# Patient Record
Sex: FEMALE | Race: WHITE | Employment: OTHER | URBAN - METROPOLITAN AREA
[De-identification: names, ages, dates, MRNs, and addresses within clinical notes are randomized per-mention and may not be internally consistent; named-entity substitution may affect disease eponyms.]

---

## 2018-12-06 ENCOUNTER — TELEPHONE (OUTPATIENT)
Dept: ONCOLOGY | Age: 63
End: 2018-12-06

## 2018-12-17 ENCOUNTER — HOSPITAL ENCOUNTER (OUTPATIENT)
Dept: INFUSION THERAPY | Age: 63
Discharge: HOME | End: 2018-12-17
Payer: COMMERCIAL

## 2018-12-17 ENCOUNTER — TELEPHONE (OUTPATIENT)
Dept: INFUSION THERAPY | Age: 63
End: 2018-12-17

## 2018-12-17 ENCOUNTER — OFFICE VISIT (OUTPATIENT)
Dept: ONCOLOGY | Age: 63
End: 2018-12-17
Payer: COMMERCIAL

## 2018-12-17 VITALS
HEIGHT: 72 IN | SYSTOLIC BLOOD PRESSURE: 117 MMHG | RESPIRATION RATE: 20 BRPM | HEART RATE: 84 BPM | TEMPERATURE: 97.9 F | DIASTOLIC BLOOD PRESSURE: 56 MMHG

## 2018-12-17 DIAGNOSIS — C95.00 ACUTE LEUKEMIA NOT HAVING ACHIEVED REMISSION (HCC): Primary | ICD-10-CM

## 2018-12-17 DIAGNOSIS — C95.00 ACUTE LEUKEMIA NOT HAVING ACHIEVED REMISSION (HCC): ICD-10-CM

## 2018-12-17 LAB
ABO/RH: NORMAL
ALBUMIN SERPL-MCNC: 3 G/DL (ref 3.5–5.2)
ALP BLD-CCNC: 85 U/L (ref 35–104)
ALT SERPL-CCNC: 21 U/L (ref 0–32)
ANION GAP SERPL CALCULATED.3IONS-SCNC: 12 MMOL/L (ref 7–16)
ANTIBODY SCREEN: NORMAL
AST SERPL-CCNC: 14 U/L (ref 0–31)
BASOPHILS ABSOLUTE: 0 E9/L (ref 0–0.2)
BASOPHILS RELATIVE PERCENT: 0 % (ref 0–2)
BILIRUB SERPL-MCNC: 0.3 MG/DL (ref 0–1.2)
BUN BLDV-MCNC: 24 MG/DL (ref 8–23)
CALCIUM SERPL-MCNC: 8.9 MG/DL (ref 8.6–10.2)
CHLORIDE BLD-SCNC: 98 MMOL/L (ref 98–107)
CO2: 24 MMOL/L (ref 22–29)
CREAT SERPL-MCNC: 1.1 MG/DL (ref 0.5–1)
EOSINOPHILS ABSOLUTE: 0 E9/L (ref 0.05–0.5)
EOSINOPHILS RELATIVE PERCENT: 1.9 % (ref 0–6)
GFR AFRICAN AMERICAN: >60
GFR NON-AFRICAN AMERICAN: 50 ML/MIN/1.73
GLUCOSE BLD-MCNC: 253 MG/DL (ref 74–99)
HCT VFR BLD CALC: 21 % (ref 34–48)
HEMOGLOBIN: 7 G/DL (ref 11.5–15.5)
LYMPHOCYTES ABSOLUTE: 0.96 E9/L (ref 1.5–4)
LYMPHOCYTES RELATIVE PERCENT: 87.3 % (ref 20–42)
MAGNESIUM: 1.4 MG/DL (ref 1.6–2.6)
MCH RBC QN AUTO: 29.8 PG (ref 26–35)
MCHC RBC AUTO-ENTMCNC: 33.3 % (ref 32–34.5)
MCV RBC AUTO: 89.4 FL (ref 80–99.9)
METAMYELOCYTES RELATIVE PERCENT: 2.8 % (ref 0–1)
MONOCYTES ABSOLUTE: 0.1 E9/L (ref 0.1–0.95)
MONOCYTES RELATIVE PERCENT: 8.5 % (ref 2–12)
NEUTROPHILS ABSOLUTE: 0.03 E9/L (ref 1.8–7.3)
NEUTROPHILS RELATIVE PERCENT: 3.7 % (ref 43–80)
PDW BLD-RTO: 13.7 FL (ref 11.5–15)
PLATELET # BLD: 13 E9/L (ref 130–450)
PLATELET CONFIRMATION: NORMAL
PMV BLD AUTO: 12.8 FL (ref 7–12)
POTASSIUM SERPL-SCNC: 4 MMOL/L (ref 3.5–5)
PROMYELOCYTES PERCENT: 1.4 % (ref 0–0)
RBC # BLD: 2.35 E12/L (ref 3.5–5.5)
SODIUM BLD-SCNC: 134 MMOL/L (ref 132–146)
TOTAL PROTEIN: 6.7 G/DL (ref 6.4–8.3)
URIC ACID, SERUM: 6.1 MG/DL (ref 2.4–5.7)
WBC # BLD: 1.1 E9/L (ref 4.5–11.5)

## 2018-12-17 PROCEDURE — 1036F TOBACCO NON-USER: CPT | Performed by: INTERNAL MEDICINE

## 2018-12-17 PROCEDURE — 86850 RBC ANTIBODY SCREEN: CPT

## 2018-12-17 PROCEDURE — 80053 COMPREHEN METABOLIC PANEL: CPT

## 2018-12-17 PROCEDURE — G8427 DOCREV CUR MEDS BY ELIG CLIN: HCPCS | Performed by: INTERNAL MEDICINE

## 2018-12-17 PROCEDURE — 85025 COMPLETE CBC W/AUTO DIFF WBC: CPT

## 2018-12-17 PROCEDURE — 83735 ASSAY OF MAGNESIUM: CPT

## 2018-12-17 PROCEDURE — 84550 ASSAY OF BLOOD/URIC ACID: CPT

## 2018-12-17 PROCEDURE — 3017F COLORECTAL CA SCREEN DOC REV: CPT | Performed by: INTERNAL MEDICINE

## 2018-12-17 PROCEDURE — 86901 BLOOD TYPING SEROLOGIC RH(D): CPT

## 2018-12-17 PROCEDURE — 36415 COLL VENOUS BLD VENIPUNCTURE: CPT

## 2018-12-17 PROCEDURE — 99214 OFFICE O/P EST MOD 30 MIN: CPT | Performed by: INTERNAL MEDICINE

## 2018-12-17 PROCEDURE — G8421 BMI NOT CALCULATED: HCPCS | Performed by: INTERNAL MEDICINE

## 2018-12-17 PROCEDURE — 99205 OFFICE O/P NEW HI 60 MIN: CPT | Performed by: INTERNAL MEDICINE

## 2018-12-17 PROCEDURE — 86900 BLOOD TYPING SEROLOGIC ABO: CPT

## 2018-12-17 PROCEDURE — G8484 FLU IMMUNIZE NO ADMIN: HCPCS | Performed by: INTERNAL MEDICINE

## 2018-12-17 RX ORDER — ONDANSETRON HYDROCHLORIDE 8 MG/1
8 TABLET, FILM COATED ORAL EVERY 8 HOURS PRN
COMMUNITY
End: 2019-12-06

## 2018-12-17 RX ORDER — LOSARTAN POTASSIUM 100 MG/1
100 TABLET ORAL DAILY
COMMUNITY

## 2018-12-17 RX ORDER — CIPROFLOXACIN 500 MG/1
500 TABLET, FILM COATED ORAL 2 TIMES DAILY
COMMUNITY
End: 2019-01-07 | Stop reason: SDUPTHER

## 2018-12-17 RX ORDER — ACYCLOVIR 400 MG/1
400 TABLET ORAL 2 TIMES DAILY
COMMUNITY
End: 2020-11-17 | Stop reason: ALTCHOICE

## 2018-12-17 RX ORDER — ISOSORBIDE MONONITRATE 30 MG/1
30 TABLET, EXTENDED RELEASE ORAL DAILY
COMMUNITY

## 2018-12-17 RX ORDER — ATORVASTATIN CALCIUM 80 MG/1
80 TABLET, FILM COATED ORAL DAILY
COMMUNITY
End: 2019-10-22

## 2018-12-17 RX ORDER — FLUCONAZOLE 200 MG/1
400 TABLET ORAL DAILY
COMMUNITY
End: 2019-06-21 | Stop reason: SDUPTHER

## 2018-12-17 RX ORDER — METOPROLOL TARTRATE 100 MG/1
100 TABLET ORAL 2 TIMES DAILY
COMMUNITY

## 2018-12-17 RX ORDER — PANTOPRAZOLE SODIUM 40 MG/1
40 TABLET, DELAYED RELEASE ORAL DAILY
COMMUNITY

## 2018-12-17 NOTE — PROGRESS NOTES
[address] [facility] [address] [address]  Dept: [phone]  Loc: [phone]  Attending Consult Note      Reason for Visit:   AML. Referring Physician: Dr. Alondra Nayak (C/Wali Anaya). PCP:  No primary care provider on file. History of Present Illness: The patient is a pleasant 58-year-old lady with a past medical history significant for hypertension, type II diabetes mellitus, hyperlipidemia, coronary artery disease, status post stents placement, morbid obesity, who had presented to [facility] for his near syncopal episode, weakness and profound fatigue, she was found to have a white count of 31.3, hemoglobin was 5.8, platelet count 13,056, she was transferred to [facility] with concern for acute leukemia, she had a bone marrow biopsy done on 11/23/2018, 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 NGS panel- IDH1, PDBG95, CBL slice site. Due to the patient's D: Addition to this intake, requiring significant assistance with mobility without ECOG 2-3, the patient was treated with azacitidine 75 mg/m² ×7 day course between 1127 and 12/3/2018, no tumor lysis was observed, she was on allopurinol for TLS prophylaxis, and acyclovir the year and the voriconazole for infection prophylaxis while inpatient, she was discharged on acyclovir, fluconazole and ciprofloxacin, which she has been taking. The patient is feeling tired, no chest pain or shortness of breath, no bleeding. She has lower leg edema, she had venous ultrasounds done they were negative for DVT, she also has history of lower extremities ulcers, she used to see the wound clinic at [address] [place] [address]. The patient is scheduled to see Dr. Lashonda Anaya at [facility] on 12/31/2018. Review of Systems;  CONSTITUTIONAL: No fever, chills. Fair appetite, she is weak. ENMT: Eyes: No diplopia; Nose: No epistaxis. Mouth: No sore throat.   RESPIRATORY: No hemoptysis,
action  Score 2 or greater:  1. For Non-Diabetic Patient: Recommend adding Ensure Complete 2xdaily and provide              patient with Ensure wellness bag with coupons; For Diabetic Patient, Recommend adding Glucerna Shake 2xdaily and provide patient with Glucerna Wellness bag with coupons  Route to the dietitian via [address] [place] [address]    · Are you having  difficulty performing daily routine tasks  due to fatigue or weakness (ie: bathing/showering, dressing, housework, meal prep, work, child Ether Peed): No     · Do you have any arm flexibility/ROM restrictions, swelling or pain that limit activity: No     · Any changes in memory, attention/focus that impact daily activities: No     · Do you avoid participation in leisure/social activity due weakness, fatigue or pain: Yes     IF ANY OF THE ABOVE ARE ANSWERED YES:   a. Referral request for OT sent to NP? No   · If NO, then why: No, per patient     b. NP Name:         [place] [id number]    · Have you had any recent falls in past 2 months: No     · Do you have difficulty  going up/down stairs: Yes     · Are you having difficulty walking: Yes     · Do you often hold onto furniture/environmental supports or feel off balance when you are walking: Yes     · Do you need to take rest breaks when you are walking: Yes     · Any pain on scale of 1-10 that limits your mobility: No 0/10    IF ANY OF THE ABOVE ARE ANSWERED YES:   a. Referral request for PT sent to NP? No   · If NO, then why: Referral placed per [facility]   b. NP Name:       Distress Screening Assessment   1. Completed by the patient? Yes  2. Reviewed by RN? Yes  3. Triggers met for immediate intervention (score of 6 or more)? No   If Yes: a. What intervention provided: Denies other referrals, on full disability    b. Referral made to ?  Yes               Mery Krishna

## 2018-12-17 NOTE — TELEPHONE ENCOUNTER
Vita Gustavoleslie made four attempts to contact patient urgently. The first message was made at 10:25 am, left message, on 's mobile phone regarding the date and time of patient's next office visit - the call was not returned. The second message was left at 11:04 am, again on the 's mobile phone, asking patient to phone our office back because she needed to have her blood drawn for a transfusion to be done. The third phone call was left at 12:27 pm again asking patient to phone back due to need for blood to be drawn and that transfusion needed to be done. Again attempted to call patient at 3:25 pm on 's mobile phone, left another message, and called patient's mobile as well. Patient did answer her mobile phone. It was explained that she needed a transfusion and to please come in before the office closed at 4:30 pm.  Patient said that she could not come this afternoon, as she could not get out of the house, so she was then transferred to [address] [place] [address]. our  to discuss making appointment arrangements.

## 2018-12-17 NOTE — PROGRESS NOTES
Met with patient and her  during her initial consultation appointment with Dr. Cinthia Baker at [facility] for her recent AML diagnosis per [facility]. Introduced myself and explained my role with patients receiving treatment at our cancer center. Patient was friendly and receptive. Completed nursing assessment for the center. Family is supportive and assist with needs. Patient received inpatient Azacitidine treatment starting on 11/27/18 after her bone marrow biopsy on 11/23/18. Patient states that she was supposed to receive Home Care and PT referral from [facility], but hadn't heard from them. Informed her that Dr. Cinthia Baker could place referral today. Patient agreeable. Upon inquiring, states that she uses a walker in the house but has 5 steps to enter and is experiencing difficulty. Denies any appetite changes or weight loss. Her and her  are on full disability, and have a 16year old son. Denies any transportation issues. Agreeable to SW referral.  Provided with written literature of Patient Resource Booklet:  Multiple Myeloma, Leukemia, and Lymphoma, and my contact information. Instructed patient to call me with questions or concerns. Verbalizes understanding. Patient appreciative of visit. Will continue to follow as needed. Mary Mendoza, GINGERW, RN, OCN  Oncology Nurse Navigator

## 2018-12-17 NOTE — TELEPHONE ENCOUNTER
Spoke with pt. Regarding need for additional lab work before she can be transfused. She is adamant that she cannot return for labs until tomorrow at [address] [place] [address] that we may be unable to give RBCs and Platelets tomorrow as they need to be Irradiated, leucocyte reduced and CMV- and will need to come from [facility]. She will come to the [facility] tomorrow at [facility]. Dr. Hassan Najjar notified of above.

## 2018-12-18 ENCOUNTER — TELEPHONE (OUTPATIENT)
Dept: INFUSION THERAPY | Age: 63
End: 2018-12-18

## 2018-12-18 ENCOUNTER — HOSPITAL ENCOUNTER (OUTPATIENT)
Dept: INFUSION THERAPY | Age: 63
Discharge: HOME | End: 2018-12-18
Payer: COMMERCIAL

## 2018-12-18 VITALS
DIASTOLIC BLOOD PRESSURE: 61 MMHG | RESPIRATION RATE: 18 BRPM | SYSTOLIC BLOOD PRESSURE: 130 MMHG | HEART RATE: 85 BPM | TEMPERATURE: 98.3 F

## 2018-12-18 DIAGNOSIS — C95.00 ACUTE LEUKEMIA NOT HAVING ACHIEVED REMISSION (HCC): ICD-10-CM

## 2018-12-18 LAB
ABO/RH: NORMAL
ABO/RH: NORMAL
ANTIBODY SCREEN: NORMAL

## 2018-12-18 PROCEDURE — 2580000003 HC RX 258: Performed by: INTERNAL MEDICINE

## 2018-12-18 PROCEDURE — 96366 THER/PROPH/DIAG IV INF ADDON: CPT

## 2018-12-18 PROCEDURE — 86923 COMPATIBILITY TEST ELECTRIC: CPT

## 2018-12-18 PROCEDURE — 86900 BLOOD TYPING SEROLOGIC ABO: CPT

## 2018-12-18 PROCEDURE — P9040 RBC LEUKOREDUCED IRRADIATED: HCPCS

## 2018-12-18 PROCEDURE — 86850 RBC ANTIBODY SCREEN: CPT

## 2018-12-18 PROCEDURE — 6360000002 HC RX W HCPCS: Performed by: INTERNAL MEDICINE

## 2018-12-18 PROCEDURE — P9035 PLATELET PHERES LEUKOREDUCED: HCPCS

## 2018-12-18 PROCEDURE — 86905 BLOOD TYPING RBC ANTIGENS: CPT

## 2018-12-18 PROCEDURE — 96365 THER/PROPH/DIAG IV INF INIT: CPT

## 2018-12-18 PROCEDURE — 86901 BLOOD TYPING SEROLOGIC RH(D): CPT

## 2018-12-18 RX ORDER — METHYLPREDNISOLONE SODIUM SUCCINATE 125 MG/2ML
125 INJECTION, POWDER, LYOPHILIZED, FOR SOLUTION INTRAMUSCULAR; INTRAVENOUS ONCE
Status: CANCELLED | OUTPATIENT
Start: 2018-12-18 | End: 2018-12-18

## 2018-12-18 RX ORDER — EPINEPHRINE 1 MG/ML
0.3 INJECTION, SOLUTION, CONCENTRATE INTRAVENOUS PRN
Status: CANCELLED | OUTPATIENT
Start: 2018-12-18

## 2018-12-18 RX ORDER — HEPARIN SODIUM (PORCINE) LOCK FLUSH IV SOLN 100 UNIT/ML 100 UNIT/ML
500 SOLUTION INTRAVENOUS PRN
Status: CANCELLED | OUTPATIENT
Start: 2018-12-18

## 2018-12-18 RX ORDER — SODIUM CHLORIDE 9 MG/ML
INJECTION, SOLUTION INTRAVENOUS CONTINUOUS
Status: CANCELLED | OUTPATIENT
Start: 2018-12-18

## 2018-12-18 RX ORDER — SODIUM CHLORIDE 0.9 % (FLUSH) 0.9 %
20 SYRINGE (ML) INJECTION PRN
Status: CANCELLED | OUTPATIENT
Start: 2018-12-18

## 2018-12-18 RX ORDER — SODIUM CHLORIDE 9 MG/ML
INJECTION, SOLUTION INTRAVENOUS CONTINUOUS
Status: DISCONTINUED | OUTPATIENT
Start: 2018-12-18 | End: 2018-12-19 | Stop reason: HOSPADM

## 2018-12-18 RX ORDER — DIPHENHYDRAMINE HYDROCHLORIDE 50 MG/ML
50 INJECTION INTRAMUSCULAR; INTRAVENOUS ONCE
Status: CANCELLED | OUTPATIENT
Start: 2018-12-18 | End: 2018-12-18

## 2018-12-18 RX ORDER — 0.9 % SODIUM CHLORIDE 0.9 %
10 VIAL (ML) INJECTION ONCE
Status: CANCELLED | OUTPATIENT
Start: 2018-12-18 | End: 2018-12-18

## 2018-12-18 RX ORDER — SODIUM CHLORIDE 0.9 % (FLUSH) 0.9 %
10 SYRINGE (ML) INJECTION PRN
Status: CANCELLED | OUTPATIENT
Start: 2018-12-18

## 2018-12-18 RX ORDER — MAGNESIUM SULFATE IN WATER 40 MG/ML
2 INJECTION, SOLUTION INTRAVENOUS ONCE
Status: COMPLETED | OUTPATIENT
Start: 2018-12-18 | End: 2018-12-18

## 2018-12-18 RX ORDER — MAGNESIUM SULFATE IN WATER 40 MG/ML
2 INJECTION, SOLUTION INTRAVENOUS ONCE
Status: CANCELLED
Start: 2018-12-18 | End: 2018-12-18

## 2018-12-18 RX ORDER — SODIUM CHLORIDE 9 MG/ML
INJECTION, SOLUTION INTRAVENOUS CONTINUOUS
Status: CANCELLED
Start: 2018-12-18

## 2018-12-18 RX ADMIN — MAGNESIUM SULFATE HEPTAHYDRATE 2 G: 40 INJECTION, SOLUTION INTRAVENOUS at 12:21

## 2018-12-18 RX ADMIN — SODIUM CHLORIDE: 9 INJECTION, SOLUTION INTRAVENOUS at 12:20

## 2018-12-18 NOTE — TELEPHONE ENCOUNTER
Faxed referral and clinicals to [facility] [place] [address] to schedule patient visits. Faxed referral and clinicals to [facility] to schedule patient visits.

## 2018-12-18 NOTE — PROGRESS NOTES
Patient discarded green blood bank band yesterday after she went home. Type and Screen redrawn via IV and sent to lab. I called to ask if there was a specific time frame for the CABOF specimen to be drawn, Paola from blood bank stated that they would use today's specimens for the test and to not worry about it. Lead RN notified.

## 2018-12-19 ENCOUNTER — HOSPITAL ENCOUNTER (OUTPATIENT)
Dept: INFUSION THERAPY | Age: 63
Discharge: HOME | End: 2018-12-19
Payer: COMMERCIAL

## 2018-12-19 VITALS
SYSTOLIC BLOOD PRESSURE: 148 MMHG | HEART RATE: 73 BPM | DIASTOLIC BLOOD PRESSURE: 71 MMHG | RESPIRATION RATE: 20 BRPM | TEMPERATURE: 98.4 F

## 2018-12-19 DIAGNOSIS — C95.00 ACUTE LEUKEMIA NOT HAVING ACHIEVED REMISSION (HCC): ICD-10-CM

## 2018-12-19 LAB
BLOOD BANK DISPENSE STATUS: NORMAL
BLOOD BANK PRODUCT CODE: NORMAL
BPU ID: NORMAL
DESCRIPTION BLOOD BANK: NORMAL

## 2018-12-19 PROCEDURE — 36430 TRANSFUSION BLD/BLD COMPNT: CPT

## 2018-12-19 PROCEDURE — P9031 PLATELETS LEUKOCYTES REDUCED: HCPCS

## 2018-12-19 PROCEDURE — 2580000003 HC RX 258: Performed by: NURSE PRACTITIONER

## 2018-12-19 PROCEDURE — P9016 RBC LEUKOCYTES REDUCED: HCPCS

## 2018-12-19 RX ORDER — 0.9 % SODIUM CHLORIDE 0.9 %
10 VIAL (ML) INJECTION ONCE
Status: CANCELLED | OUTPATIENT
Start: 2018-12-19 | End: 2018-12-19

## 2018-12-19 RX ORDER — DIPHENHYDRAMINE HYDROCHLORIDE 50 MG/ML
50 INJECTION INTRAMUSCULAR; INTRAVENOUS ONCE
Status: CANCELLED | OUTPATIENT
Start: 2018-12-19 | End: 2018-12-19

## 2018-12-19 RX ORDER — SODIUM CHLORIDE 0.9 % (FLUSH) 0.9 %
20 SYRINGE (ML) INJECTION PRN
Status: CANCELLED | OUTPATIENT
Start: 2018-12-19

## 2018-12-19 RX ORDER — METHYLPREDNISOLONE SODIUM SUCCINATE 125 MG/2ML
125 INJECTION, POWDER, LYOPHILIZED, FOR SOLUTION INTRAMUSCULAR; INTRAVENOUS ONCE
Status: CANCELLED | OUTPATIENT
Start: 2018-12-19 | End: 2018-12-19

## 2018-12-19 RX ORDER — SODIUM CHLORIDE 9 MG/ML
INJECTION, SOLUTION INTRAVENOUS CONTINUOUS
Status: DISCONTINUED | OUTPATIENT
Start: 2018-12-19 | End: 2018-12-20 | Stop reason: HOSPADM

## 2018-12-19 RX ORDER — EPINEPHRINE 1 MG/ML
0.3 INJECTION, SOLUTION, CONCENTRATE INTRAVENOUS PRN
Status: CANCELLED | OUTPATIENT
Start: 2018-12-19

## 2018-12-19 RX ORDER — SODIUM CHLORIDE 9 MG/ML
INJECTION, SOLUTION INTRAVENOUS CONTINUOUS
Status: CANCELLED | OUTPATIENT
Start: 2018-12-19

## 2018-12-19 RX ADMIN — SODIUM CHLORIDE: 9 INJECTION, SOLUTION INTRAVENOUS at 11:31

## 2018-12-19 RX ADMIN — SODIUM CHLORIDE: 9 INJECTION, SOLUTION INTRAVENOUS at 09:52

## 2018-12-19 NOTE — PROGRESS NOTES
Patient received 1 unit(s) of platelets and 2 units of PRBCs. Transfusion was tolerated well and the patient was observed for 30 minutes prior to discharge.

## 2018-12-20 ENCOUNTER — HOSPITAL ENCOUNTER (OUTPATIENT)
Dept: INFUSION THERAPY | Age: 63
Discharge: HOME | End: 2018-12-20
Payer: COMMERCIAL

## 2018-12-20 ENCOUNTER — OFFICE VISIT (OUTPATIENT)
Dept: ONCOLOGY | Age: 63
End: 2018-12-20
Payer: COMMERCIAL

## 2018-12-20 VITALS
SYSTOLIC BLOOD PRESSURE: 129 MMHG | RESPIRATION RATE: 20 BRPM | HEIGHT: 72 IN | HEART RATE: 68 BPM | DIASTOLIC BLOOD PRESSURE: 59 MMHG | TEMPERATURE: 97.4 F

## 2018-12-20 DIAGNOSIS — C95.00 ACUTE LEUKEMIA NOT HAVING ACHIEVED REMISSION (HCC): Primary | ICD-10-CM

## 2018-12-20 LAB
ALBUMIN SERPL-MCNC: 3.2 G/DL (ref 3.5–5.2)
ALP BLD-CCNC: 88 U/L (ref 35–104)
ALT SERPL-CCNC: 19 U/L (ref 0–32)
ANION GAP SERPL CALCULATED.3IONS-SCNC: 13 MMOL/L (ref 7–16)
AST SERPL-CCNC: 13 U/L (ref 0–31)
BASOPHILS ABSOLUTE: 0 E9/L (ref 0–0.2)
BASOPHILS RELATIVE PERCENT: 0 % (ref 0–2)
BILIRUB SERPL-MCNC: 0.6 MG/DL (ref 0–1.2)
BUN BLDV-MCNC: 19 MG/DL (ref 8–23)
CALCIUM SERPL-MCNC: 8.7 MG/DL (ref 8.6–10.2)
CHLORIDE BLD-SCNC: 103 MMOL/L (ref 98–107)
CO2: 24 MMOL/L (ref 22–29)
CREAT SERPL-MCNC: 1 MG/DL (ref 0.5–1)
EOSINOPHILS ABSOLUTE: 0 E9/L (ref 0.05–0.5)
EOSINOPHILS RELATIVE PERCENT: 0 % (ref 0–6)
GFR AFRICAN AMERICAN: >60
GFR NON-AFRICAN AMERICAN: 56 ML/MIN/1.73
GLUCOSE BLD-MCNC: 131 MG/DL (ref 74–99)
HCT VFR BLD CALC: 24.6 % (ref 34–48)
HEMOGLOBIN: 8.6 G/DL (ref 11.5–15.5)
LYMPHOCYTES ABSOLUTE: 0.89 E9/L (ref 1.5–4)
LYMPHOCYTES RELATIVE PERCENT: 89 % (ref 20–42)
MAGNESIUM: 1.4 MG/DL (ref 1.6–2.6)
MCH RBC QN AUTO: 29.8 PG (ref 26–35)
MCHC RBC AUTO-ENTMCNC: 35 % (ref 32–34.5)
MCV RBC AUTO: 85.1 FL (ref 80–99.9)
MONOCYTES ABSOLUTE: 0.1 E9/L (ref 0.1–0.95)
MONOCYTES RELATIVE PERCENT: 10 % (ref 2–12)
NEUTROPHILS ABSOLUTE: 0.01 E9/L (ref 1.8–7.3)
NEUTROPHILS RELATIVE PERCENT: 1 % (ref 43–80)
PDW BLD-RTO: 13.9 FL (ref 11.5–15)
PLATELET # BLD: 22 E9/L (ref 130–450)
PLATELET CONFIRMATION: NORMAL
PMV BLD AUTO: 11.2 FL (ref 7–12)
POTASSIUM SERPL-SCNC: 4 MMOL/L (ref 3.5–5)
RBC # BLD: 2.89 E12/L (ref 3.5–5.5)
SODIUM BLD-SCNC: 140 MMOL/L (ref 132–146)
TOTAL PROTEIN: 6.8 G/DL (ref 6.4–8.3)
WBC # BLD: 1 E9/L (ref 4.5–11.5)

## 2018-12-20 PROCEDURE — 99214 OFFICE O/P EST MOD 30 MIN: CPT | Performed by: INTERNAL MEDICINE

## 2018-12-20 PROCEDURE — 3017F COLORECTAL CA SCREEN DOC REV: CPT | Performed by: INTERNAL MEDICINE

## 2018-12-20 PROCEDURE — 36415 COLL VENOUS BLD VENIPUNCTURE: CPT

## 2018-12-20 PROCEDURE — G8427 DOCREV CUR MEDS BY ELIG CLIN: HCPCS | Performed by: INTERNAL MEDICINE

## 2018-12-20 PROCEDURE — G8484 FLU IMMUNIZE NO ADMIN: HCPCS | Performed by: INTERNAL MEDICINE

## 2018-12-20 PROCEDURE — 80053 COMPREHEN METABOLIC PANEL: CPT

## 2018-12-20 PROCEDURE — G8421 BMI NOT CALCULATED: HCPCS | Performed by: INTERNAL MEDICINE

## 2018-12-20 PROCEDURE — 85025 COMPLETE CBC W/AUTO DIFF WBC: CPT

## 2018-12-20 PROCEDURE — 1036F TOBACCO NON-USER: CPT | Performed by: INTERNAL MEDICINE

## 2018-12-20 PROCEDURE — 99212 OFFICE O/P EST SF 10 MIN: CPT

## 2018-12-20 PROCEDURE — 83735 ASSAY OF MAGNESIUM: CPT

## 2018-12-20 RX ORDER — LANOLIN ALCOHOL/MO/W.PET/CERES
400 CREAM (GRAM) TOPICAL 2 TIMES DAILY
Qty: 60 TABLET | Refills: 3 | Status: SHIPPED | OUTPATIENT
Start: 2018-12-20 | End: 2019-07-11 | Stop reason: ALTCHOICE

## 2018-12-20 NOTE — PROGRESS NOTES
Spoke with pt regarding the need for her to return tomorrow for labs and possible platelet transfusion. She will come to the [facility] at [facility]. Also explained that her magnesium was low and that she will need to take oral replacement. The prescription is at the pharmacy and she will pick upon the way home and begin taking today. Encouraged to call with questions/concerns.

## 2018-12-21 ENCOUNTER — HOSPITAL ENCOUNTER (OUTPATIENT)
Dept: INFUSION THERAPY | Age: 63
Discharge: HOME | End: 2018-12-21
Payer: COMMERCIAL

## 2018-12-21 ENCOUNTER — TELEPHONE (OUTPATIENT)
Dept: ONCOLOGY | Age: 63
End: 2018-12-21

## 2018-12-21 LAB
BASOPHILS ABSOLUTE: 0.01 E9/L (ref 0–0.2)
BASOPHILS RELATIVE PERCENT: 1 % (ref 0–2)
EOSINOPHILS ABSOLUTE: 0.01 E9/L (ref 0.05–0.5)
EOSINOPHILS RELATIVE PERCENT: 1 % (ref 0–6)
HCT VFR BLD CALC: 24.4 % (ref 34–48)
HEMOGLOBIN: 8.5 G/DL (ref 11.5–15.5)
LYMPHOCYTES ABSOLUTE: 0.9 E9/L (ref 1.5–4)
LYMPHOCYTES RELATIVE PERCENT: 90.4 % (ref 20–42)
MCH RBC QN AUTO: 30 PG (ref 26–35)
MCHC RBC AUTO-ENTMCNC: 34.8 % (ref 32–34.5)
MCV RBC AUTO: 86.2 FL (ref 80–99.9)
MONOCYTES ABSOLUTE: 0.06 E9/L (ref 0.1–0.95)
MONOCYTES RELATIVE PERCENT: 5.8 % (ref 2–12)
NEUTROPHILS ABSOLUTE: 0.02 E9/L (ref 1.8–7.3)
NEUTROPHILS RELATIVE PERCENT: 1.9 % (ref 43–80)
OVALOCYTES: ABNORMAL
PDW BLD-RTO: 13.7 FL (ref 11.5–15)
PLATELET # BLD: 25 E9/L (ref 130–450)
PLATELET CONFIRMATION: NORMAL
PMV BLD AUTO: 10.2 FL (ref 7–12)
POIKILOCYTES: ABNORMAL
RBC # BLD: 2.83 E12/L (ref 3.5–5.5)
WBC # BLD: 1 E9/L (ref 4.5–11.5)

## 2018-12-21 PROCEDURE — 36415 COLL VENOUS BLD VENIPUNCTURE: CPT

## 2018-12-21 PROCEDURE — 85025 COMPLETE CBC W/AUTO DIFF WBC: CPT

## 2018-12-26 ENCOUNTER — OFFICE VISIT (OUTPATIENT)
Dept: ONCOLOGY | Age: 63
End: 2018-12-26
Payer: COMMERCIAL

## 2018-12-26 ENCOUNTER — HOSPITAL ENCOUNTER (OUTPATIENT)
Dept: INFUSION THERAPY | Age: 63
Discharge: HOME | End: 2018-12-26
Payer: COMMERCIAL

## 2018-12-26 VITALS
DIASTOLIC BLOOD PRESSURE: 86 MMHG | RESPIRATION RATE: 20 BRPM | SYSTOLIC BLOOD PRESSURE: 139 MMHG | TEMPERATURE: 97.5 F | HEIGHT: 72 IN | HEART RATE: 69 BPM

## 2018-12-26 DIAGNOSIS — C95.00 ACUTE LEUKEMIA NOT HAVING ACHIEVED REMISSION (HCC): Primary | ICD-10-CM

## 2018-12-26 DIAGNOSIS — C95.00 ACUTE LEUKEMIA NOT HAVING ACHIEVED REMISSION (HCC): ICD-10-CM

## 2018-12-26 LAB
ABO/RH: NORMAL
ALBUMIN SERPL-MCNC: 3.1 G/DL (ref 3.5–5.2)
ALP BLD-CCNC: 83 U/L (ref 35–104)
ALT SERPL-CCNC: 12 U/L (ref 0–32)
ANION GAP SERPL CALCULATED.3IONS-SCNC: 13 MMOL/L (ref 7–16)
ANTIBODY IDENTIFICATION: NORMAL
ANTIBODY IDENTIFICATION: NORMAL
ANTIBODY SCREEN: NORMAL
AST SERPL-CCNC: 8 U/L (ref 0–31)
BASOPHILS ABSOLUTE: 0.01 E9/L (ref 0–0.2)
BASOPHILS RELATIVE PERCENT: 1 % (ref 0–2)
BILIRUB SERPL-MCNC: 0.5 MG/DL (ref 0–1.2)
BUN BLDV-MCNC: 17 MG/DL (ref 8–23)
C ANTIGEN: NORMAL
CALCIUM SERPL-MCNC: 8.7 MG/DL (ref 8.6–10.2)
CHLORIDE BLD-SCNC: 103 MMOL/L (ref 98–107)
CO2: 26 MMOL/L (ref 22–29)
CREAT SERPL-MCNC: 1 MG/DL (ref 0.5–1)
DAT POLYSPECIFIC: NORMAL
DR. NOTIFY: NORMAL
E ANTIGEN: NORMAL
EOSINOPHILS ABSOLUTE: 0 E9/L (ref 0.05–0.5)
EOSINOPHILS RELATIVE PERCENT: 0 % (ref 0–6)
GFR AFRICAN AMERICAN: >60
GFR NON-AFRICAN AMERICAN: 56 ML/MIN/1.73
GLUCOSE BLD-MCNC: 173 MG/DL (ref 74–99)
HCT VFR BLD CALC: 22.3 % (ref 34–48)
HEMOGLOBIN: 7.7 G/DL (ref 11.5–15.5)
HYPOCHROMIA: ABNORMAL
JKA ANTIGEN: NORMAL
JKB ANTIGEN: NORMAL
LYMPHOCYTES ABSOLUTE: 0.74 E9/L (ref 1.5–4)
LYMPHOCYTES RELATIVE PERCENT: 93 % (ref 20–42)
MCH RBC QN AUTO: 30 PG (ref 26–35)
MCHC RBC AUTO-ENTMCNC: 34.5 % (ref 32–34.5)
MCV RBC AUTO: 86.8 FL (ref 80–99.9)
MONOCYTES ABSOLUTE: 0.05 E9/L (ref 0.1–0.95)
MONOCYTES RELATIVE PERCENT: 6 % (ref 2–12)
NEUTROPHILS ABSOLUTE: 0 E9/L (ref 1.8–7.3)
NEUTROPHILS RELATIVE PERCENT: 0 % (ref 43–80)
NUCLEATED RED BLOOD CELLS: 1 /100 WBC
OVALOCYTES: ABNORMAL
PDW BLD-RTO: 13.2 FL (ref 11.5–15)
PLATELET # BLD: 77 E9/L (ref 130–450)
PLATELET CONFIRMATION: NORMAL
PMV BLD AUTO: 10.5 FL (ref 7–12)
POIKILOCYTES: ABNORMAL
POTASSIUM SERPL-SCNC: 3.9 MMOL/L (ref 3.5–5)
RBC # BLD: 2.57 E12/L (ref 3.5–5.5)
SODIUM BLD-SCNC: 142 MMOL/L (ref 132–146)
TOTAL PROTEIN: 6.7 G/DL (ref 6.4–8.3)
WBC # BLD: 0.8 E9/L (ref 4.5–11.5)

## 2018-12-26 PROCEDURE — 86870 RBC ANTIBODY IDENTIFICATION: CPT

## 2018-12-26 PROCEDURE — 36415 COLL VENOUS BLD VENIPUNCTURE: CPT

## 2018-12-26 PROCEDURE — 86880 COOMBS TEST DIRECT: CPT

## 2018-12-26 PROCEDURE — 86922 COMPATIBILITY TEST ANTIGLOB: CPT

## 2018-12-26 PROCEDURE — 99212 OFFICE O/P EST SF 10 MIN: CPT

## 2018-12-26 PROCEDURE — 86901 BLOOD TYPING SEROLOGIC RH(D): CPT

## 2018-12-26 PROCEDURE — 80053 COMPREHEN METABOLIC PANEL: CPT

## 2018-12-26 PROCEDURE — 86900 BLOOD TYPING SEROLOGIC ABO: CPT

## 2018-12-26 PROCEDURE — 85025 COMPLETE CBC W/AUTO DIFF WBC: CPT

## 2018-12-26 PROCEDURE — 86850 RBC ANTIBODY SCREEN: CPT

## 2018-12-26 RX ORDER — METHYLPREDNISOLONE SODIUM SUCCINATE 125 MG/2ML
125 INJECTION, POWDER, LYOPHILIZED, FOR SOLUTION INTRAMUSCULAR; INTRAVENOUS ONCE
Status: CANCELLED | OUTPATIENT
Start: 2018-12-26 | End: 2018-12-26

## 2018-12-26 RX ORDER — SODIUM CHLORIDE 9 MG/ML
INJECTION, SOLUTION INTRAVENOUS CONTINUOUS
Status: CANCELLED | OUTPATIENT
Start: 2018-12-26

## 2018-12-26 RX ORDER — SODIUM CHLORIDE 0.9 % (FLUSH) 0.9 %
20 SYRINGE (ML) INJECTION PRN
Status: CANCELLED | OUTPATIENT
Start: 2018-12-26

## 2018-12-26 RX ORDER — 0.9 % SODIUM CHLORIDE 0.9 %
10 VIAL (ML) INJECTION ONCE
Status: CANCELLED | OUTPATIENT
Start: 2018-12-26 | End: 2018-12-26

## 2018-12-26 RX ORDER — EPINEPHRINE 1 MG/ML
0.3 INJECTION, SOLUTION, CONCENTRATE INTRAVENOUS PRN
Status: CANCELLED | OUTPATIENT
Start: 2018-12-26

## 2018-12-26 RX ORDER — DIPHENHYDRAMINE HYDROCHLORIDE 50 MG/ML
50 INJECTION INTRAMUSCULAR; INTRAVENOUS ONCE
Status: CANCELLED | OUTPATIENT
Start: 2018-12-26 | End: 2018-12-26

## 2018-12-26 NOTE — PROGRESS NOTES
giving her the azacitidine after she sees Dr. Kareem Cervantes.      She has pancytopenia secondary to AML and azacitidine, on 12/17 her hemoglobin was 7 g/dl, plts 13K, received 2 units of PRBCs, and 1 dose of plts (leukoreduced and irradiated), her platelets are 66,725 today, hemoglobin 8.6. No need for blood product transfusion today, we will recheck her CBC tomorrow due to the holiday weekend.     For antimicrobial prophylaxis, continue acyclovir, fluconazole and ciprofloxacin. Neutropenic precautions.     Referral to home health care and home PT was placed, also referred to the wound clinic.     Hypomagnesemia, prescribed oral magnesium twice daily. Her creatinine had improved.     RTC on 12/26/2018 with labs, possible blood products transfusion. Labs tomorrow. PLAN:    Patient has appointment with Dr Sergo Taylor at [facility] on December 26, 2018. Dr. Eyal Fontanez is planning on giving her the azacitidine after she sees Dr. Kareem Cervantes. Her Hgb is 7.7 today. 1 unit RBC's ordered. Blood Bank called at 1:15 pm stating she has + antibodies and does not know when unit will be available. Patient will return tomorrow at 11 AM for blood transfusion.     To return 1/3/19 to see Dr Star Strickland, [address]:  [phone]

## 2018-12-26 NOTE — PROGRESS NOTES
The patients transfusion will not be prepared until tomorrow, the patient is sent home with instruction to return tomorrow.

## 2018-12-27 ENCOUNTER — HOSPITAL ENCOUNTER (OUTPATIENT)
Dept: INFUSION THERAPY | Age: 63
Discharge: HOME | End: 2018-12-27
Payer: COMMERCIAL

## 2018-12-27 VITALS
TEMPERATURE: 97.4 F | SYSTOLIC BLOOD PRESSURE: 130 MMHG | HEART RATE: 68 BPM | RESPIRATION RATE: 18 BRPM | DIASTOLIC BLOOD PRESSURE: 54 MMHG

## 2018-12-27 DIAGNOSIS — C95.00 ACUTE LEUKEMIA NOT HAVING ACHIEVED REMISSION (HCC): ICD-10-CM

## 2018-12-27 PROCEDURE — P9040 RBC LEUKOREDUCED IRRADIATED: HCPCS

## 2018-12-27 PROCEDURE — 2580000003 HC RX 258: Performed by: NURSE PRACTITIONER

## 2018-12-27 PROCEDURE — 36430 TRANSFUSION BLD/BLD COMPNT: CPT

## 2018-12-27 RX ORDER — DIPHENHYDRAMINE HYDROCHLORIDE 50 MG/ML
50 INJECTION INTRAMUSCULAR; INTRAVENOUS ONCE
Status: CANCELLED | OUTPATIENT
Start: 2018-12-27 | End: 2018-12-27

## 2018-12-27 RX ORDER — SODIUM CHLORIDE 9 MG/ML
INJECTION, SOLUTION INTRAVENOUS CONTINUOUS
Status: CANCELLED | OUTPATIENT
Start: 2018-12-27

## 2018-12-27 RX ORDER — 0.9 % SODIUM CHLORIDE 0.9 %
10 VIAL (ML) INJECTION ONCE
Status: CANCELLED | OUTPATIENT
Start: 2018-12-27 | End: 2018-12-27

## 2018-12-27 RX ORDER — SODIUM CHLORIDE 9 MG/ML
INJECTION, SOLUTION INTRAVENOUS CONTINUOUS
Status: DISCONTINUED | OUTPATIENT
Start: 2018-12-27 | End: 2018-12-28 | Stop reason: HOSPADM

## 2018-12-27 RX ORDER — EPINEPHRINE 1 MG/ML
0.3 INJECTION, SOLUTION, CONCENTRATE INTRAVENOUS PRN
Status: CANCELLED | OUTPATIENT
Start: 2018-12-27

## 2018-12-27 RX ORDER — METHYLPREDNISOLONE SODIUM SUCCINATE 125 MG/2ML
125 INJECTION, POWDER, LYOPHILIZED, FOR SOLUTION INTRAMUSCULAR; INTRAVENOUS ONCE
Status: CANCELLED | OUTPATIENT
Start: 2018-12-27 | End: 2018-12-27

## 2018-12-27 RX ORDER — SODIUM CHLORIDE 0.9 % (FLUSH) 0.9 %
20 SYRINGE (ML) INJECTION PRN
Status: CANCELLED | OUTPATIENT
Start: 2018-12-27

## 2018-12-27 RX ADMIN — SODIUM CHLORIDE: 9 INJECTION, SOLUTION INTRAVENOUS at 11:29

## 2018-12-27 NOTE — PROGRESS NOTES
Patient received 1 unit(s) of PRBC's. Transfusion was tolerated well and the patient was observed for 30 minutes prior to discharge.

## 2019-01-07 ENCOUNTER — OFFICE VISIT (OUTPATIENT)
Dept: ONCOLOGY | Age: 64
End: 2019-01-07
Payer: MEDICARE

## 2019-01-07 ENCOUNTER — HOSPITAL ENCOUNTER (OUTPATIENT)
Dept: INFUSION THERAPY | Age: 64
Discharge: HOME | End: 2019-01-07
Payer: MEDICARE

## 2019-01-07 VITALS
HEIGHT: 72 IN | RESPIRATION RATE: 20 BRPM | SYSTOLIC BLOOD PRESSURE: 146 MMHG | HEART RATE: 79 BPM | TEMPERATURE: 97.7 F | DIASTOLIC BLOOD PRESSURE: 71 MMHG

## 2019-01-07 VITALS
TEMPERATURE: 98.2 F | HEART RATE: 76 BPM | DIASTOLIC BLOOD PRESSURE: 67 MMHG | SYSTOLIC BLOOD PRESSURE: 152 MMHG | RESPIRATION RATE: 18 BRPM

## 2019-01-07 DIAGNOSIS — C95.00 ACUTE LEUKEMIA NOT HAVING ACHIEVED REMISSION (HCC): ICD-10-CM

## 2019-01-07 DIAGNOSIS — C95.00 ACUTE LEUKEMIA NOT HAVING ACHIEVED REMISSION (HCC): Primary | ICD-10-CM

## 2019-01-07 LAB
ABO/RH: NORMAL
ALBUMIN SERPL-MCNC: 3.3 G/DL (ref 3.5–5.2)
ALP BLD-CCNC: 102 U/L (ref 35–104)
ALT SERPL-CCNC: 8 U/L (ref 0–32)
ANION GAP SERPL CALCULATED.3IONS-SCNC: 12 MMOL/L (ref 7–16)
ANTIBODY SCREEN: NORMAL
AST SERPL-CCNC: 6 U/L (ref 0–31)
BASOPHILS ABSOLUTE: 0 E9/L (ref 0–0.2)
BASOPHILS RELATIVE PERCENT: 0 % (ref 0–2)
BILIRUB SERPL-MCNC: 0.5 MG/DL (ref 0–1.2)
BLOOD BANK DISPENSE STATUS: NORMAL
BLOOD BANK DISPENSE STATUS: NORMAL
BLOOD BANK PRODUCT CODE: NORMAL
BLOOD BANK PRODUCT CODE: NORMAL
BPU ID: NORMAL
BPU ID: NORMAL
BUN BLDV-MCNC: 19 MG/DL (ref 8–23)
CALCIUM SERPL-MCNC: 8.8 MG/DL (ref 8.6–10.2)
CHLORIDE BLD-SCNC: 100 MMOL/L (ref 98–107)
CO2: 26 MMOL/L (ref 22–29)
CREAT SERPL-MCNC: 0.8 MG/DL (ref 0.5–1)
DESCRIPTION BLOOD BANK: NORMAL
DESCRIPTION BLOOD BANK: NORMAL
EOSINOPHILS ABSOLUTE: 0 E9/L (ref 0.05–0.5)
EOSINOPHILS RELATIVE PERCENT: 0 % (ref 0–6)
GFR AFRICAN AMERICAN: >60
GFR NON-AFRICAN AMERICAN: >60 ML/MIN/1.73
GLUCOSE BLD-MCNC: 262 MG/DL (ref 74–99)
HCT VFR BLD CALC: 19.4 % (ref 34–48)
HEMOGLOBIN: 6.6 G/DL (ref 11.5–15.5)
LYMPHOCYTES ABSOLUTE: 0.76 E9/L (ref 1.5–4)
LYMPHOCYTES RELATIVE PERCENT: 84 % (ref 20–42)
MAGNESIUM: 1.6 MG/DL (ref 1.6–2.6)
MCH RBC QN AUTO: 30.6 PG (ref 26–35)
MCHC RBC AUTO-ENTMCNC: 34 % (ref 32–34.5)
MCV RBC AUTO: 89.8 FL (ref 80–99.9)
MONOCYTES ABSOLUTE: 0.04 E9/L (ref 0.1–0.95)
MONOCYTES RELATIVE PERCENT: 5 % (ref 2–12)
NEUTROPHILS ABSOLUTE: 0.1 E9/L (ref 1.8–7.3)
NEUTROPHILS RELATIVE PERCENT: 11 % (ref 43–80)
PDW BLD-RTO: 15.6 FL (ref 11.5–15)
PLATELET # BLD: 77 E9/L (ref 130–450)
PLATELET CONFIRMATION: NORMAL
PMV BLD AUTO: 10.5 FL (ref 7–12)
POTASSIUM SERPL-SCNC: 3.9 MMOL/L (ref 3.5–5)
RBC # BLD: 2.16 E12/L (ref 3.5–5.5)
RBC # BLD: NORMAL 10*6/UL
SODIUM BLD-SCNC: 138 MMOL/L (ref 132–146)
TOTAL PROTEIN: 6.3 G/DL (ref 6.4–8.3)
WBC # BLD: 0.9 E9/L (ref 4.5–11.5)

## 2019-01-07 PROCEDURE — 3017F COLORECTAL CA SCREEN DOC REV: CPT | Performed by: INTERNAL MEDICINE

## 2019-01-07 PROCEDURE — 99214 OFFICE O/P EST MOD 30 MIN: CPT | Performed by: INTERNAL MEDICINE

## 2019-01-07 PROCEDURE — 1036F TOBACCO NON-USER: CPT | Performed by: INTERNAL MEDICINE

## 2019-01-07 PROCEDURE — 6360000002 HC RX W HCPCS: Performed by: INTERNAL MEDICINE

## 2019-01-07 PROCEDURE — 85025 COMPLETE CBC W/AUTO DIFF WBC: CPT

## 2019-01-07 PROCEDURE — 83735 ASSAY OF MAGNESIUM: CPT

## 2019-01-07 PROCEDURE — 86850 RBC ANTIBODY SCREEN: CPT

## 2019-01-07 PROCEDURE — G8484 FLU IMMUNIZE NO ADMIN: HCPCS | Performed by: INTERNAL MEDICINE

## 2019-01-07 PROCEDURE — 86901 BLOOD TYPING SEROLOGIC RH(D): CPT

## 2019-01-07 PROCEDURE — 86900 BLOOD TYPING SEROLOGIC ABO: CPT

## 2019-01-07 PROCEDURE — 36430 TRANSFUSION BLD/BLD COMPNT: CPT

## 2019-01-07 PROCEDURE — 2580000003 HC RX 258: Performed by: INTERNAL MEDICINE

## 2019-01-07 PROCEDURE — G8427 DOCREV CUR MEDS BY ELIG CLIN: HCPCS | Performed by: INTERNAL MEDICINE

## 2019-01-07 PROCEDURE — P9040 RBC LEUKOREDUCED IRRADIATED: HCPCS

## 2019-01-07 PROCEDURE — 96374 THER/PROPH/DIAG INJ IV PUSH: CPT

## 2019-01-07 PROCEDURE — 36415 COLL VENOUS BLD VENIPUNCTURE: CPT

## 2019-01-07 PROCEDURE — G8421 BMI NOT CALCULATED: HCPCS | Performed by: INTERNAL MEDICINE

## 2019-01-07 PROCEDURE — 86922 COMPATIBILITY TEST ANTIGLOB: CPT

## 2019-01-07 PROCEDURE — 80053 COMPREHEN METABOLIC PANEL: CPT

## 2019-01-07 RX ORDER — DIPHENHYDRAMINE HYDROCHLORIDE 50 MG/ML
50 INJECTION INTRAMUSCULAR; INTRAVENOUS ONCE
Status: CANCELLED | OUTPATIENT
Start: 2019-01-07 | End: 2019-01-07

## 2019-01-07 RX ORDER — METHYLPREDNISOLONE SODIUM SUCCINATE 125 MG/2ML
125 INJECTION, POWDER, LYOPHILIZED, FOR SOLUTION INTRAMUSCULAR; INTRAVENOUS ONCE
Status: CANCELLED | OUTPATIENT
Start: 2019-01-07 | End: 2019-01-07

## 2019-01-07 RX ORDER — SODIUM CHLORIDE 9 MG/ML
INJECTION, SOLUTION INTRAVENOUS CONTINUOUS
Status: DISCONTINUED | OUTPATIENT
Start: 2019-01-07 | End: 2019-01-08 | Stop reason: HOSPADM

## 2019-01-07 RX ORDER — SODIUM CHLORIDE 0.9 % (FLUSH) 0.9 %
20 SYRINGE (ML) INJECTION PRN
Status: CANCELLED | OUTPATIENT
Start: 2019-01-07

## 2019-01-07 RX ORDER — SODIUM CHLORIDE 9 MG/ML
INJECTION, SOLUTION INTRAVENOUS CONTINUOUS
Status: CANCELLED | OUTPATIENT
Start: 2019-01-07

## 2019-01-07 RX ORDER — EPINEPHRINE 1 MG/ML
0.3 INJECTION, SOLUTION, CONCENTRATE INTRAVENOUS PRN
Status: CANCELLED | OUTPATIENT
Start: 2019-01-07

## 2019-01-07 RX ORDER — FUROSEMIDE 10 MG/ML
20 INJECTION INTRAMUSCULAR; INTRAVENOUS ONCE
Status: CANCELLED | OUTPATIENT
Start: 2019-01-07 | End: 2019-01-07

## 2019-01-07 RX ORDER — 0.9 % SODIUM CHLORIDE 0.9 %
10 VIAL (ML) INJECTION ONCE
Status: CANCELLED | OUTPATIENT
Start: 2019-01-07 | End: 2019-01-07

## 2019-01-07 RX ORDER — CIPROFLOXACIN 500 MG/1
500 TABLET, FILM COATED ORAL 2 TIMES DAILY
Qty: 60 TABLET | Refills: 1 | Status: ON HOLD | OUTPATIENT
Start: 2019-01-07 | End: 2019-03-01 | Stop reason: HOSPADM

## 2019-01-07 RX ORDER — FUROSEMIDE 10 MG/ML
20 INJECTION INTRAMUSCULAR; INTRAVENOUS ONCE
Status: COMPLETED | OUTPATIENT
Start: 2019-01-07 | End: 2019-01-07

## 2019-01-07 RX ADMIN — FUROSEMIDE 20 MG: 10 INJECTION, SOLUTION INTRAMUSCULAR; INTRAVENOUS at 16:16

## 2019-01-07 RX ADMIN — SODIUM CHLORIDE: 9 INJECTION, SOLUTION INTRAVENOUS at 12:58

## 2019-01-07 NOTE — PROGRESS NOTES
Pt presents to clinic for 2 units of PRBC. Pt tolerated treatment well without complications. Pt was discharged stable, via wheelchair. Pt was observed for 20 mins prior to discharge.

## 2019-01-14 ENCOUNTER — HOSPITAL ENCOUNTER (OUTPATIENT)
Dept: INFUSION THERAPY | Age: 64
Discharge: HOME | End: 2019-01-14
Payer: MEDICARE

## 2019-01-14 ENCOUNTER — OFFICE VISIT (OUTPATIENT)
Dept: ONCOLOGY | Age: 64
End: 2019-01-14
Payer: MEDICARE

## 2019-01-14 VITALS
DIASTOLIC BLOOD PRESSURE: 75 MMHG | TEMPERATURE: 97.8 F | HEART RATE: 78 BPM | SYSTOLIC BLOOD PRESSURE: 110 MMHG | HEIGHT: 72 IN | RESPIRATION RATE: 20 BRPM

## 2019-01-14 DIAGNOSIS — C95.00 ACUTE LEUKEMIA NOT HAVING ACHIEVED REMISSION (HCC): ICD-10-CM

## 2019-01-14 LAB
ALBUMIN SERPL-MCNC: 3.3 G/DL (ref 3.5–5.2)
ALP BLD-CCNC: 99 U/L (ref 35–104)
ALT SERPL-CCNC: 9 U/L (ref 0–32)
ANION GAP SERPL CALCULATED.3IONS-SCNC: 13 MMOL/L (ref 7–16)
AST SERPL-CCNC: 9 U/L (ref 0–31)
BASOPHILS ABSOLUTE: 0.01 E9/L (ref 0–0.2)
BASOPHILS RELATIVE PERCENT: 0.9 % (ref 0–2)
BILIRUB SERPL-MCNC: 0.6 MG/DL (ref 0–1.2)
BUN BLDV-MCNC: 21 MG/DL (ref 8–23)
CALCIUM SERPL-MCNC: 8.7 MG/DL (ref 8.6–10.2)
CHLORIDE BLD-SCNC: 99 MMOL/L (ref 98–107)
CO2: 25 MMOL/L (ref 22–29)
CREAT SERPL-MCNC: 0.9 MG/DL (ref 0.5–1)
EOSINOPHILS ABSOLUTE: 0 E9/L (ref 0.05–0.5)
EOSINOPHILS RELATIVE PERCENT: 0.7 % (ref 0–6)
GFR AFRICAN AMERICAN: >60
GFR NON-AFRICAN AMERICAN: >60 ML/MIN/1.73
GLUCOSE BLD-MCNC: 336 MG/DL (ref 74–99)
HCT VFR BLD CALC: 24.8 % (ref 34–48)
HEMOGLOBIN: 8.3 G/DL (ref 11.5–15.5)
LYMPHOCYTES ABSOLUTE: 0.98 E9/L (ref 1.5–4)
LYMPHOCYTES RELATIVE PERCENT: 69.6 % (ref 20–42)
MAGNESIUM: 1.3 MG/DL (ref 1.6–2.6)
MCH RBC QN AUTO: 30.4 PG (ref 26–35)
MCHC RBC AUTO-ENTMCNC: 33.5 % (ref 32–34.5)
MCV RBC AUTO: 90.8 FL (ref 80–99.9)
MONOCYTES ABSOLUTE: 0.01 E9/L (ref 0.1–0.95)
MONOCYTES RELATIVE PERCENT: 0.9 % (ref 2–12)
NEUTROPHILS ABSOLUTE: 0.41 E9/L (ref 1.8–7.3)
NEUTROPHILS RELATIVE PERCENT: 28.7 % (ref 43–80)
PDW BLD-RTO: 18.2 FL (ref 11.5–15)
PLATELET # BLD: 122 E9/L (ref 130–450)
PMV BLD AUTO: 9.9 FL (ref 7–12)
POTASSIUM SERPL-SCNC: 4 MMOL/L (ref 3.5–5)
RBC # BLD: 2.73 E12/L (ref 3.5–5.5)
RBC # BLD: NORMAL 10*6/UL
SODIUM BLD-SCNC: 137 MMOL/L (ref 132–146)
TOTAL PROTEIN: 6.5 G/DL (ref 6.4–8.3)
WBC # BLD: 1.4 E9/L (ref 4.5–11.5)

## 2019-01-14 PROCEDURE — 85025 COMPLETE CBC W/AUTO DIFF WBC: CPT

## 2019-01-14 PROCEDURE — 83735 ASSAY OF MAGNESIUM: CPT

## 2019-01-14 PROCEDURE — G8421 BMI NOT CALCULATED: HCPCS | Performed by: INTERNAL MEDICINE

## 2019-01-14 PROCEDURE — 1036F TOBACCO NON-USER: CPT | Performed by: INTERNAL MEDICINE

## 2019-01-14 PROCEDURE — G8484 FLU IMMUNIZE NO ADMIN: HCPCS | Performed by: INTERNAL MEDICINE

## 2019-01-14 PROCEDURE — 99214 OFFICE O/P EST MOD 30 MIN: CPT | Performed by: INTERNAL MEDICINE

## 2019-01-14 PROCEDURE — G8427 DOCREV CUR MEDS BY ELIG CLIN: HCPCS | Performed by: INTERNAL MEDICINE

## 2019-01-14 PROCEDURE — 80053 COMPREHEN METABOLIC PANEL: CPT

## 2019-01-14 PROCEDURE — 99212 OFFICE O/P EST SF 10 MIN: CPT | Performed by: INTERNAL MEDICINE

## 2019-01-14 PROCEDURE — 36415 COLL VENOUS BLD VENIPUNCTURE: CPT

## 2019-01-14 PROCEDURE — 3017F COLORECTAL CA SCREEN DOC REV: CPT | Performed by: INTERNAL MEDICINE

## 2019-01-14 RX ORDER — MAGNESIUM SULFATE IN WATER 40 MG/ML
2 INJECTION, SOLUTION INTRAVENOUS ONCE
Status: CANCELLED
Start: 2019-01-15 | End: 2019-01-15

## 2019-01-15 ENCOUNTER — HOSPITAL ENCOUNTER (OUTPATIENT)
Dept: INFUSION THERAPY | Age: 64
Discharge: HOME | End: 2019-01-15
Payer: MEDICARE

## 2019-01-15 VITALS — RESPIRATION RATE: 20 BRPM | HEART RATE: 76 BPM | DIASTOLIC BLOOD PRESSURE: 67 MMHG | SYSTOLIC BLOOD PRESSURE: 151 MMHG

## 2019-01-15 DIAGNOSIS — C95.00 ACUTE LEUKEMIA NOT HAVING ACHIEVED REMISSION (HCC): ICD-10-CM

## 2019-01-15 PROCEDURE — 6360000002 HC RX W HCPCS: Performed by: INTERNAL MEDICINE

## 2019-01-15 PROCEDURE — 96366 THER/PROPH/DIAG IV INF ADDON: CPT

## 2019-01-15 PROCEDURE — 2580000003 HC RX 258: Performed by: INTERNAL MEDICINE

## 2019-01-15 PROCEDURE — 96365 THER/PROPH/DIAG IV INF INIT: CPT

## 2019-01-15 RX ORDER — SODIUM CHLORIDE 9 MG/ML
INJECTION, SOLUTION INTRAVENOUS CONTINUOUS
Status: DISCONTINUED | OUTPATIENT
Start: 2019-01-15 | End: 2019-01-16 | Stop reason: HOSPADM

## 2019-01-15 RX ORDER — HEPARIN SODIUM (PORCINE) LOCK FLUSH IV SOLN 100 UNIT/ML 100 UNIT/ML
500 SOLUTION INTRAVENOUS PRN
Status: CANCELLED | OUTPATIENT
Start: 2019-01-15

## 2019-01-15 RX ORDER — MAGNESIUM SULFATE IN WATER 40 MG/ML
2 INJECTION, SOLUTION INTRAVENOUS ONCE
Status: COMPLETED | OUTPATIENT
Start: 2019-01-16 | End: 2019-01-15

## 2019-01-15 RX ORDER — SODIUM CHLORIDE 0.9 % (FLUSH) 0.9 %
10 SYRINGE (ML) INJECTION PRN
Status: CANCELLED | OUTPATIENT
Start: 2019-01-15

## 2019-01-15 RX ORDER — MAGNESIUM SULFATE IN WATER 40 MG/ML
2 INJECTION, SOLUTION INTRAVENOUS ONCE
Status: CANCELLED
Start: 2019-01-16 | End: 2019-01-16

## 2019-01-15 RX ORDER — SODIUM CHLORIDE 9 MG/ML
INJECTION, SOLUTION INTRAVENOUS CONTINUOUS
Status: CANCELLED
Start: 2019-01-15

## 2019-01-15 RX ADMIN — MAGNESIUM SULFATE HEPTAHYDRATE 2 G: 40 INJECTION, SOLUTION INTRAVENOUS at 11:15

## 2019-01-15 RX ADMIN — SODIUM CHLORIDE: 9 INJECTION, SOLUTION INTRAVENOUS at 11:15

## 2019-02-11 ENCOUNTER — TELEPHONE (OUTPATIENT)
Dept: INFUSION THERAPY | Age: 64
End: 2019-02-11

## 2019-02-12 ENCOUNTER — TELEPHONE (OUTPATIENT)
Dept: ONCOLOGY | Age: 64
End: 2019-02-12

## 2019-02-12 NOTE — TELEPHONE ENCOUNTER
Spoke with Christine's  this morning.   said Elton Hernandez was still up in [place] but took appt information down for labs only for 2/15/19 @ 11am.

## 2019-02-15 ENCOUNTER — HOSPITAL ENCOUNTER (OUTPATIENT)
Dept: INFUSION THERAPY | Age: 64
Discharge: HOME | End: 2019-02-15
Payer: MEDICARE

## 2019-02-15 VITALS — HEART RATE: 74 BPM | SYSTOLIC BLOOD PRESSURE: 133 MMHG | RESPIRATION RATE: 20 BRPM | DIASTOLIC BLOOD PRESSURE: 63 MMHG

## 2019-02-15 VITALS
SYSTOLIC BLOOD PRESSURE: 158 MMHG | HEART RATE: 76 BPM | DIASTOLIC BLOOD PRESSURE: 73 MMHG | RESPIRATION RATE: 20 BRPM | TEMPERATURE: 96.8 F

## 2019-02-15 DIAGNOSIS — C95.00 ACUTE LEUKEMIA NOT HAVING ACHIEVED REMISSION (HCC): ICD-10-CM

## 2019-02-15 LAB
ALBUMIN SERPL-MCNC: 2.3 G/DL (ref 3.5–5.2)
ALP BLD-CCNC: 142 U/L (ref 35–104)
ALT SERPL-CCNC: 10 U/L (ref 0–32)
ANION GAP SERPL CALCULATED.3IONS-SCNC: 11 MMOL/L (ref 7–16)
AST SERPL-CCNC: 15 U/L (ref 0–31)
BASOPHILS ABSOLUTE: 0 E9/L (ref 0–0.2)
BASOPHILS RELATIVE PERCENT: 0.2 % (ref 0–2)
BILIRUB SERPL-MCNC: 0.3 MG/DL (ref 0–1.2)
BLASTS RELATIVE PERCENT: 2.7 % (ref 0–0)
BUN BLDV-MCNC: 14 MG/DL (ref 8–23)
CALCIUM SERPL-MCNC: 8.3 MG/DL (ref 8.6–10.2)
CHLORIDE BLD-SCNC: 102 MMOL/L (ref 98–107)
CO2: 26 MMOL/L (ref 22–29)
CREAT SERPL-MCNC: 0.9 MG/DL (ref 0.5–1)
EOSINOPHILS ABSOLUTE: 0 E9/L (ref 0.05–0.5)
EOSINOPHILS RELATIVE PERCENT: 0 % (ref 0–6)
GFR AFRICAN AMERICAN: >60
GFR NON-AFRICAN AMERICAN: >60 ML/MIN/1.73
GLUCOSE BLD-MCNC: 223 MG/DL (ref 74–99)
HCT VFR BLD CALC: 27.1 % (ref 34–48)
HEMOGLOBIN: 8.9 G/DL (ref 11.5–15.5)
HYPOCHROMIA: ABNORMAL
LYMPHOCYTES ABSOLUTE: 0.89 E9/L (ref 1.5–4)
LYMPHOCYTES RELATIVE PERCENT: 10.6 % (ref 20–42)
MAGNESIUM: 1.3 MG/DL (ref 1.6–2.6)
MCH RBC QN AUTO: 31.4 PG (ref 26–35)
MCHC RBC AUTO-ENTMCNC: 32.8 % (ref 32–34.5)
MCV RBC AUTO: 95.8 FL (ref 80–99.9)
MONOCYTES ABSOLUTE: 1.86 E9/L (ref 0.1–0.95)
MONOCYTES RELATIVE PERCENT: 23 % (ref 2–12)
NEUTROPHILS ABSOLUTE: 5.18 E9/L (ref 1.8–7.3)
NEUTROPHILS RELATIVE PERCENT: 63.7 % (ref 43–80)
OVALOCYTES: ABNORMAL
PDW BLD-RTO: 15 FL (ref 11.5–15)
PLATELET # BLD: 185 E9/L (ref 130–450)
PMV BLD AUTO: 9.5 FL (ref 7–12)
POIKILOCYTES: ABNORMAL
POLYCHROMASIA: ABNORMAL
POTASSIUM SERPL-SCNC: 4 MMOL/L (ref 3.5–5)
RBC # BLD: 2.83 E12/L (ref 3.5–5.5)
SODIUM BLD-SCNC: 139 MMOL/L (ref 132–146)
TEAR DROP CELLS: ABNORMAL
TOTAL PROTEIN: 6.3 G/DL (ref 6.4–8.3)
WBC # BLD: 8.1 E9/L (ref 4.5–11.5)

## 2019-02-15 PROCEDURE — 83735 ASSAY OF MAGNESIUM: CPT

## 2019-02-15 PROCEDURE — 36591 DRAW BLOOD OFF VENOUS DEVICE: CPT

## 2019-02-15 PROCEDURE — 80053 COMPREHEN METABOLIC PANEL: CPT

## 2019-02-15 PROCEDURE — 2580000003 HC RX 258: Performed by: INTERNAL MEDICINE

## 2019-02-15 PROCEDURE — 96367 TX/PROPH/DG ADDL SEQ IV INF: CPT

## 2019-02-15 PROCEDURE — 96365 THER/PROPH/DIAG IV INF INIT: CPT

## 2019-02-15 PROCEDURE — 6360000002 HC RX W HCPCS: Performed by: INTERNAL MEDICINE

## 2019-02-15 PROCEDURE — 85025 COMPLETE CBC W/AUTO DIFF WBC: CPT

## 2019-02-15 RX ORDER — SODIUM CHLORIDE 9 MG/ML
INJECTION, SOLUTION INTRAVENOUS CONTINUOUS
Status: DISCONTINUED | OUTPATIENT
Start: 2019-02-15 | End: 2019-02-16 | Stop reason: HOSPADM

## 2019-02-15 RX ORDER — SODIUM CHLORIDE 0.9 % (FLUSH) 0.9 %
20 SYRINGE (ML) INJECTION PRN
Status: CANCELLED | OUTPATIENT
Start: 2019-02-15

## 2019-02-15 RX ORDER — HEPARIN SODIUM (PORCINE) LOCK FLUSH IV SOLN 100 UNIT/ML 100 UNIT/ML
500 SOLUTION INTRAVENOUS PRN
Status: CANCELLED | OUTPATIENT
Start: 2019-02-15

## 2019-02-15 RX ORDER — HEPARIN SODIUM (PORCINE) LOCK FLUSH IV SOLN 100 UNIT/ML 100 UNIT/ML
500 SOLUTION INTRAVENOUS PRN
Status: DISCONTINUED | OUTPATIENT
Start: 2019-02-15 | End: 2019-02-16 | Stop reason: HOSPADM

## 2019-02-15 RX ORDER — MAGNESIUM SULFATE IN WATER 40 MG/ML
2 INJECTION, SOLUTION INTRAVENOUS ONCE
Status: COMPLETED | OUTPATIENT
Start: 2019-02-15 | End: 2019-02-15

## 2019-02-15 RX ORDER — SODIUM CHLORIDE 0.9 % (FLUSH) 0.9 %
10 SYRINGE (ML) INJECTION PRN
Status: CANCELLED | OUTPATIENT
Start: 2019-02-15

## 2019-02-15 RX ORDER — SODIUM CHLORIDE 9 MG/ML
INJECTION, SOLUTION INTRAVENOUS CONTINUOUS
Status: CANCELLED
Start: 2019-02-15

## 2019-02-15 RX ORDER — SODIUM CHLORIDE 0.9 % (FLUSH) 0.9 %
10 SYRINGE (ML) INJECTION PRN
Status: DISCONTINUED | OUTPATIENT
Start: 2019-02-15 | End: 2019-02-16 | Stop reason: HOSPADM

## 2019-02-15 RX ORDER — MAGNESIUM SULFATE IN WATER 40 MG/ML
2 INJECTION, SOLUTION INTRAVENOUS ONCE
Status: CANCELLED
Start: 2019-02-15 | End: 2019-02-15

## 2019-02-15 RX ADMIN — Medication 500 UNITS: at 11:41

## 2019-02-15 RX ADMIN — Medication 10 ML: at 11:41

## 2019-02-15 RX ADMIN — MAGNESIUM SULFATE HEPTAHYDRATE 2 G: 40 INJECTION, SOLUTION INTRAVENOUS at 14:08

## 2019-02-15 RX ADMIN — Medication 10 ML: at 16:19

## 2019-02-15 RX ADMIN — Medication 500 UNITS: at 16:19

## 2019-02-15 RX ADMIN — SODIUM CHLORIDE: 9 INJECTION, SOLUTION INTRAVENOUS at 14:07

## 2019-02-15 NOTE — PROGRESS NOTES
Patient had to come back for magnesium infusion due to level being low at 1.3. Patient's  stated when she was released from [facility] they didn't have any magnesium pills and they had ran out wouldn't be available until today which is why her magnesium level is low.

## 2019-02-15 NOTE — PROGRESS NOTES
PORT FLUSH    Patient presents to clinic for [facility] labs/Port Flush today. Right hcest single  SQ port accessed per policy using 61W 2.66GLMD Espinoza needle for good blood return. Aspirate for waste and specimen sent to lab. Site flushed easily with 10 mL NSS followed by 5 mL Heparin solution 100 units/ml rinse prior to de-access. Dry sterile dressing to area. Tolerated procedure well. Encouraged to schedule port flush every 4 weeks.

## 2019-02-20 ENCOUNTER — TELEPHONE (OUTPATIENT)
Dept: ONCOLOGY | Age: 64
End: 2019-02-20

## 2019-02-22 ENCOUNTER — HOSPITAL ENCOUNTER (OUTPATIENT)
Dept: INFUSION THERAPY | Age: 64
Discharge: HOME | End: 2019-02-22
Payer: MEDICARE

## 2019-02-22 ENCOUNTER — TELEPHONE (OUTPATIENT)
Dept: INFUSION THERAPY | Age: 64
End: 2019-02-22

## 2019-02-22 DIAGNOSIS — C95.00 ACUTE LEUKEMIA NOT HAVING ACHIEVED REMISSION (HCC): ICD-10-CM

## 2019-02-22 DIAGNOSIS — C95.00 ACUTE LEUKEMIA NOT HAVING ACHIEVED REMISSION (HCC): Primary | ICD-10-CM

## 2019-02-22 DIAGNOSIS — E61.2 MAGNESIUM DEFICIENCY: ICD-10-CM

## 2019-02-22 LAB
ALBUMIN SERPL-MCNC: 2.9 G/DL (ref 3.5–5.2)
ALP BLD-CCNC: 109 U/L (ref 35–104)
ALT SERPL-CCNC: 8 U/L (ref 0–32)
ANION GAP SERPL CALCULATED.3IONS-SCNC: 10 MMOL/L (ref 7–16)
AST SERPL-CCNC: 14 U/L (ref 0–31)
BASOPHILS ABSOLUTE: 0.08 E9/L (ref 0–0.2)
BASOPHILS RELATIVE PERCENT: 0.8 % (ref 0–2)
BILIRUB SERPL-MCNC: 0.4 MG/DL (ref 0–1.2)
BUN BLDV-MCNC: 17 MG/DL (ref 8–23)
CALCIUM SERPL-MCNC: 8.8 MG/DL (ref 8.6–10.2)
CHLORIDE BLD-SCNC: 97 MMOL/L (ref 98–107)
CO2: 31 MMOL/L (ref 22–29)
CREAT SERPL-MCNC: 0.8 MG/DL (ref 0.5–1)
EOSINOPHILS ABSOLUTE: 0 E9/L (ref 0.05–0.5)
EOSINOPHILS RELATIVE PERCENT: 0 % (ref 0–6)
GFR AFRICAN AMERICAN: >60
GFR NON-AFRICAN AMERICAN: >60 ML/MIN/1.73
GLUCOSE BLD-MCNC: 304 MG/DL (ref 74–99)
HCT VFR BLD CALC: 29.1 % (ref 34–48)
HEMOGLOBIN: 9.7 G/DL (ref 11.5–15.5)
IMMATURE GRANULOCYTES #: 0.11 E9/L
IMMATURE GRANULOCYTES %: 1.1 % (ref 0–5)
LYMPHOCYTES ABSOLUTE: 1.52 E9/L (ref 1.5–4)
LYMPHOCYTES RELATIVE PERCENT: 15.4 % (ref 20–42)
MAGNESIUM: 1.5 MG/DL (ref 1.6–2.6)
MCH RBC QN AUTO: 31.7 PG (ref 26–35)
MCHC RBC AUTO-ENTMCNC: 33.3 % (ref 32–34.5)
MCV RBC AUTO: 95.1 FL (ref 80–99.9)
MONOCYTES ABSOLUTE: 1.5 E9/L (ref 0.1–0.95)
MONOCYTES RELATIVE PERCENT: 15.2 % (ref 2–12)
NEUTROPHILS ABSOLUTE: 6.69 E9/L (ref 1.8–7.3)
NEUTROPHILS RELATIVE PERCENT: 67.5 % (ref 43–80)
PDW BLD-RTO: 15.7 FL (ref 11.5–15)
PLATELET # BLD: 442 E9/L (ref 130–450)
PMV BLD AUTO: 9.5 FL (ref 7–12)
POTASSIUM SERPL-SCNC: 3.8 MMOL/L (ref 3.5–5)
RBC # BLD: 3.06 E12/L (ref 3.5–5.5)
SODIUM BLD-SCNC: 138 MMOL/L (ref 132–146)
TOTAL PROTEIN: 6.7 G/DL (ref 6.4–8.3)
WBC # BLD: 9.9 E9/L (ref 4.5–11.5)

## 2019-02-22 PROCEDURE — 2580000003 HC RX 258: Performed by: NURSE PRACTITIONER

## 2019-02-22 PROCEDURE — 83735 ASSAY OF MAGNESIUM: CPT

## 2019-02-22 PROCEDURE — 36591 DRAW BLOOD OFF VENOUS DEVICE: CPT

## 2019-02-22 PROCEDURE — 80053 COMPREHEN METABOLIC PANEL: CPT

## 2019-02-22 PROCEDURE — 6360000002 HC RX W HCPCS: Performed by: NURSE PRACTITIONER

## 2019-02-22 PROCEDURE — 85025 COMPLETE CBC W/AUTO DIFF WBC: CPT

## 2019-02-22 RX ORDER — SODIUM CHLORIDE 0.9 % (FLUSH) 0.9 %
10 SYRINGE (ML) INJECTION PRN
Status: DISCONTINUED | OUTPATIENT
Start: 2019-02-22 | End: 2019-02-23 | Stop reason: HOSPADM

## 2019-02-22 RX ORDER — SODIUM CHLORIDE 0.9 % (FLUSH) 0.9 %
10 SYRINGE (ML) INJECTION PRN
Status: CANCELLED | OUTPATIENT
Start: 2019-02-22

## 2019-02-22 RX ORDER — HEPARIN SODIUM (PORCINE) LOCK FLUSH IV SOLN 100 UNIT/ML 100 UNIT/ML
500 SOLUTION INTRAVENOUS PRN
Status: CANCELLED | OUTPATIENT
Start: 2019-02-22

## 2019-02-22 RX ORDER — SODIUM CHLORIDE 0.9 % (FLUSH) 0.9 %
20 SYRINGE (ML) INJECTION PRN
Status: CANCELLED | OUTPATIENT
Start: 2019-02-22

## 2019-02-22 RX ORDER — HEPARIN SODIUM (PORCINE) LOCK FLUSH IV SOLN 100 UNIT/ML 100 UNIT/ML
500 SOLUTION INTRAVENOUS PRN
Status: DISCONTINUED | OUTPATIENT
Start: 2019-02-22 | End: 2019-02-23 | Stop reason: HOSPADM

## 2019-02-22 RX ADMIN — Medication 10 ML: at 12:33

## 2019-02-22 RX ADMIN — Medication 500 UNITS: at 12:33

## 2019-02-22 NOTE — PROGRESS NOTES
PORT FLUSH / LAB DRAW    Patient presents to clinic for [facility] today. 20  SQ port accessed per policy using 9.20 Espinoza needle for good blood return. Aspirate for waste and specimen sent to lab. Site flushed easily with 10 mL NSS followed by 5 mL Heparin solution 100 units/ml rinse prior to de-access. Dry sterile dressing to area. Tolerated procedure well. Encouraged to schedule port flush every 4 weeks.

## 2019-02-27 ENCOUNTER — TELEPHONE (OUTPATIENT)
Dept: CASE MANAGEMENT | Age: 64
End: 2019-02-27

## 2019-02-28 ENCOUNTER — HOSPITAL ENCOUNTER (INPATIENT)
Age: 64
LOS: 1 days | Discharge: NURSING, REHAB OR LONG-TERM CARE | DRG: 554 | End: 2019-03-01
Attending: EMERGENCY MEDICINE | Admitting: INTERNAL MEDICINE
Payer: MEDICARE

## 2019-02-28 ENCOUNTER — APPOINTMENT (OUTPATIENT)
Dept: GENERAL RADIOLOGY | Age: 64
DRG: 554 | End: 2019-02-28
Payer: MEDICARE

## 2019-02-28 DIAGNOSIS — R53.1 GENERALIZED WEAKNESS: ICD-10-CM

## 2019-02-28 DIAGNOSIS — E83.42 HYPOMAGNESEMIA: ICD-10-CM

## 2019-02-28 DIAGNOSIS — A41.9 SEPSIS, DUE TO UNSPECIFIED ORGANISM: Primary | ICD-10-CM

## 2019-02-28 DIAGNOSIS — R79.89 ELEVATED LACTIC ACID LEVEL: ICD-10-CM

## 2019-02-28 DIAGNOSIS — G89.29 CHRONIC PAIN OF LEFT KNEE: ICD-10-CM

## 2019-02-28 DIAGNOSIS — R26.2 INABILITY TO AMBULATE DUE TO KNEE: ICD-10-CM

## 2019-02-28 DIAGNOSIS — M25.562 CHRONIC PAIN OF LEFT KNEE: ICD-10-CM

## 2019-02-28 DIAGNOSIS — J10.1 INFLUENZA A: ICD-10-CM

## 2019-02-28 LAB
ANION GAP SERPL CALCULATED.3IONS-SCNC: 12 MMOL/L (ref 7–16)
BACTERIA: ABNORMAL /HPF
BASOPHILS ABSOLUTE: 0.18 E9/L (ref 0–0.2)
BASOPHILS RELATIVE PERCENT: 2.1 % (ref 0–2)
BILIRUBIN URINE: NEGATIVE
BLOOD, URINE: ABNORMAL
BUN BLDV-MCNC: 16 MG/DL (ref 8–23)
CALCIUM SERPL-MCNC: 9.5 MG/DL (ref 8.6–10.2)
CHLORIDE BLD-SCNC: 101 MMOL/L (ref 98–107)
CLARITY: CLEAR
CO2: 28 MMOL/L (ref 22–29)
COLOR: YELLOW
CREAT SERPL-MCNC: 0.9 MG/DL (ref 0.5–1)
EOSINOPHILS ABSOLUTE: 0.03 E9/L (ref 0.05–0.5)
EOSINOPHILS RELATIVE PERCENT: 0.3 % (ref 0–6)
EPITHELIAL CELLS, UA: ABNORMAL /HPF
GFR AFRICAN AMERICAN: >60
GFR NON-AFRICAN AMERICAN: >60 ML/MIN/1.73
GLUCOSE BLD-MCNC: 138 MG/DL (ref 74–99)
GLUCOSE URINE: NEGATIVE MG/DL
HCT VFR BLD CALC: 33.1 % (ref 34–48)
HEMOGLOBIN: 10.9 G/DL (ref 11.5–15.5)
IMMATURE GRANULOCYTES #: 0.05 E9/L
IMMATURE GRANULOCYTES %: 0.6 % (ref 0–5)
INFLUENZA A BY PCR: DETECTED
INFLUENZA B BY PCR: NOT DETECTED
KETONES, URINE: NEGATIVE MG/DL
LACTIC ACID, SEPSIS: 2.5 MMOL/L (ref 0.5–1.9)
LACTIC ACID: 2.6 MMOL/L (ref 0.5–2.2)
LACTIC ACID: 3 MMOL/L (ref 0.5–2.2)
LEUKOCYTE ESTERASE, URINE: NEGATIVE
LYMPHOCYTES ABSOLUTE: 1.64 E9/L (ref 1.5–4)
LYMPHOCYTES RELATIVE PERCENT: 19.1 % (ref 20–42)
MAGNESIUM: 1.5 MG/DL (ref 1.6–2.6)
MCH RBC QN AUTO: 31.9 PG (ref 26–35)
MCHC RBC AUTO-ENTMCNC: 32.9 % (ref 32–34.5)
MCV RBC AUTO: 96.8 FL (ref 80–99.9)
METER GLUCOSE: 177 MG/DL (ref 74–99)
MONOCYTES ABSOLUTE: 1.29 E9/L (ref 0.1–0.95)
MONOCYTES RELATIVE PERCENT: 15 % (ref 2–12)
NEUTROPHILS ABSOLUTE: 5.41 E9/L (ref 1.8–7.3)
NEUTROPHILS RELATIVE PERCENT: 62.9 % (ref 43–80)
NITRITE, URINE: NEGATIVE
PDW BLD-RTO: 16.9 FL (ref 11.5–15)
PH UA: 5.5 (ref 5–9)
PLATELET # BLD: 349 E9/L (ref 130–450)
PMV BLD AUTO: 10 FL (ref 7–12)
POTASSIUM REFLEX MAGNESIUM: 3.8 MMOL/L (ref 3.5–5)
PROTEIN UA: 100 MG/DL
RBC # BLD: 3.42 E12/L (ref 3.5–5.5)
RBC UA: ABNORMAL /HPF (ref 0–2)
SODIUM BLD-SCNC: 141 MMOL/L (ref 132–146)
SPECIFIC GRAVITY UA: 1.02 (ref 1–1.03)
TOTAL CK: 33 U/L (ref 20–180)
TROPONIN: 0.03 NG/ML (ref 0–0.03)
UROBILINOGEN, URINE: 0.2 E.U./DL
WBC # BLD: 8.6 E9/L (ref 4.5–11.5)
WBC UA: ABNORMAL /HPF (ref 0–5)

## 2019-02-28 PROCEDURE — 96365 THER/PROPH/DIAG IV INF INIT: CPT

## 2019-02-28 PROCEDURE — 1200000000 HC SEMI PRIVATE

## 2019-02-28 PROCEDURE — 6360000002 HC RX W HCPCS: Performed by: STUDENT IN AN ORGANIZED HEALTH CARE EDUCATION/TRAINING PROGRAM

## 2019-02-28 PROCEDURE — 82962 GLUCOSE BLOOD TEST: CPT

## 2019-02-28 PROCEDURE — 84484 ASSAY OF TROPONIN QUANT: CPT

## 2019-02-28 PROCEDURE — 87502 INFLUENZA DNA AMP PROBE: CPT

## 2019-02-28 PROCEDURE — 99285 EMERGENCY DEPT VISIT HI MDM: CPT

## 2019-02-28 PROCEDURE — 93005 ELECTROCARDIOGRAM TRACING: CPT | Performed by: STUDENT IN AN ORGANIZED HEALTH CARE EDUCATION/TRAINING PROGRAM

## 2019-02-28 PROCEDURE — 6370000000 HC RX 637 (ALT 250 FOR IP): Performed by: EMERGENCY MEDICINE

## 2019-02-28 PROCEDURE — 6360000002 HC RX W HCPCS: Performed by: EMERGENCY MEDICINE

## 2019-02-28 PROCEDURE — 2580000003 HC RX 258: Performed by: STUDENT IN AN ORGANIZED HEALTH CARE EDUCATION/TRAINING PROGRAM

## 2019-02-28 PROCEDURE — 83735 ASSAY OF MAGNESIUM: CPT

## 2019-02-28 PROCEDURE — 83605 ASSAY OF LACTIC ACID: CPT

## 2019-02-28 PROCEDURE — 81001 URINALYSIS AUTO W/SCOPE: CPT

## 2019-02-28 PROCEDURE — 73562 X-RAY EXAM OF KNEE 3: CPT

## 2019-02-28 PROCEDURE — 80048 BASIC METABOLIC PNL TOTAL CA: CPT

## 2019-02-28 PROCEDURE — 2580000003 HC RX 258: Performed by: EMERGENCY MEDICINE

## 2019-02-28 PROCEDURE — 96375 TX/PRO/DX INJ NEW DRUG ADDON: CPT

## 2019-02-28 PROCEDURE — 6370000000 HC RX 637 (ALT 250 FOR IP): Performed by: INTERNAL MEDICINE

## 2019-02-28 PROCEDURE — 71045 X-RAY EXAM CHEST 1 VIEW: CPT

## 2019-02-28 PROCEDURE — 82550 ASSAY OF CK (CPK): CPT

## 2019-02-28 PROCEDURE — 85025 COMPLETE CBC W/AUTO DIFF WBC: CPT

## 2019-02-28 PROCEDURE — 36415 COLL VENOUS BLD VENIPUNCTURE: CPT

## 2019-02-28 RX ORDER — DOCUSATE SODIUM 100 MG/1
100 CAPSULE, LIQUID FILLED ORAL DAILY
COMMUNITY
End: 2019-07-11 | Stop reason: ALTCHOICE

## 2019-02-28 RX ORDER — DOCUSATE SODIUM 100 MG/1
100 CAPSULE, LIQUID FILLED ORAL DAILY
Status: DISCONTINUED | OUTPATIENT
Start: 2019-03-01 | End: 2019-03-01 | Stop reason: HOSPADM

## 2019-02-28 RX ORDER — OXYCODONE HYDROCHLORIDE 5 MG/1
5 TABLET ORAL EVERY 6 HOURS PRN
Status: DISCONTINUED | OUTPATIENT
Start: 2019-02-28 | End: 2019-03-01 | Stop reason: HOSPADM

## 2019-02-28 RX ORDER — LOSARTAN POTASSIUM 50 MG/1
100 TABLET ORAL DAILY
Status: DISCONTINUED | OUTPATIENT
Start: 2019-03-01 | End: 2019-03-01 | Stop reason: HOSPADM

## 2019-02-28 RX ORDER — ISOSORBIDE MONONITRATE 30 MG/1
30 TABLET, EXTENDED RELEASE ORAL DAILY
Status: DISCONTINUED | OUTPATIENT
Start: 2019-03-01 | End: 2019-03-01 | Stop reason: HOSPADM

## 2019-02-28 RX ORDER — DEXTROSE MONOHYDRATE 50 MG/ML
100 INJECTION, SOLUTION INTRAVENOUS PRN
Status: DISCONTINUED | OUTPATIENT
Start: 2019-02-28 | End: 2019-03-01 | Stop reason: HOSPADM

## 2019-02-28 RX ORDER — ATORVASTATIN CALCIUM 40 MG/1
80 TABLET, FILM COATED ORAL DAILY
Status: DISCONTINUED | OUTPATIENT
Start: 2019-03-01 | End: 2019-03-01 | Stop reason: HOSPADM

## 2019-02-28 RX ORDER — OSELTAMIVIR PHOSPHATE 75 MG/1
75 CAPSULE ORAL ONCE
Status: COMPLETED | OUTPATIENT
Start: 2019-02-28 | End: 2019-02-28

## 2019-02-28 RX ORDER — MAGNESIUM SULFATE 1 G/100ML
1 INJECTION INTRAVENOUS ONCE
Status: COMPLETED | OUTPATIENT
Start: 2019-02-28 | End: 2019-02-28

## 2019-02-28 RX ORDER — NICOTINE POLACRILEX 4 MG
15 LOZENGE BUCCAL PRN
Status: DISCONTINUED | OUTPATIENT
Start: 2019-02-28 | End: 2019-03-01 | Stop reason: HOSPADM

## 2019-02-28 RX ORDER — PANTOPRAZOLE SODIUM 40 MG/1
40 TABLET, DELAYED RELEASE ORAL DAILY
Status: DISCONTINUED | OUTPATIENT
Start: 2019-03-01 | End: 2019-03-01 | Stop reason: HOSPADM

## 2019-02-28 RX ORDER — 0.9 % SODIUM CHLORIDE 0.9 %
2000 INTRAVENOUS SOLUTION INTRAVENOUS ONCE
Status: COMPLETED | OUTPATIENT
Start: 2019-02-28 | End: 2019-02-28

## 2019-02-28 RX ORDER — METOPROLOL TARTRATE 50 MG/1
100 TABLET, FILM COATED ORAL 2 TIMES DAILY
Status: DISCONTINUED | OUTPATIENT
Start: 2019-02-28 | End: 2019-03-01 | Stop reason: HOSPADM

## 2019-02-28 RX ORDER — 0.9 % SODIUM CHLORIDE 0.9 %
500 INTRAVENOUS SOLUTION INTRAVENOUS ONCE
Status: COMPLETED | OUTPATIENT
Start: 2019-02-28 | End: 2019-02-28

## 2019-02-28 RX ORDER — DEXTROSE MONOHYDRATE 25 G/50ML
12.5 INJECTION, SOLUTION INTRAVENOUS PRN
Status: DISCONTINUED | OUTPATIENT
Start: 2019-02-28 | End: 2019-03-01 | Stop reason: HOSPADM

## 2019-02-28 RX ORDER — ASPIRIN 81 MG/1
81 TABLET, CHEWABLE ORAL DAILY
Status: DISCONTINUED | OUTPATIENT
Start: 2019-03-01 | End: 2019-03-01 | Stop reason: HOSPADM

## 2019-02-28 RX ORDER — ONDANSETRON 4 MG/1
8 TABLET, FILM COATED ORAL EVERY 8 HOURS PRN
Status: DISCONTINUED | OUTPATIENT
Start: 2019-02-28 | End: 2019-03-01 | Stop reason: HOSPADM

## 2019-02-28 RX ORDER — ACYCLOVIR 400 MG/1
400 TABLET ORAL 2 TIMES DAILY
Status: DISCONTINUED | OUTPATIENT
Start: 2019-02-28 | End: 2019-03-01 | Stop reason: HOSPADM

## 2019-02-28 RX ORDER — ACETAMINOPHEN 325 MG/1
650 TABLET ORAL EVERY 6 HOURS
Status: DISCONTINUED | OUTPATIENT
Start: 2019-02-28 | End: 2019-03-01 | Stop reason: HOSPADM

## 2019-02-28 RX ORDER — INSULIN GLARGINE 100 [IU]/ML
35 INJECTION, SOLUTION SUBCUTANEOUS NIGHTLY
Status: DISCONTINUED | OUTPATIENT
Start: 2019-02-28 | End: 2019-03-01 | Stop reason: HOSPADM

## 2019-02-28 RX ORDER — KETOROLAC TROMETHAMINE 15 MG/ML
15 INJECTION, SOLUTION INTRAMUSCULAR; INTRAVENOUS ONCE
Status: COMPLETED | OUTPATIENT
Start: 2019-02-28 | End: 2019-02-28

## 2019-02-28 RX ADMIN — SODIUM CHLORIDE 500 ML: 9 INJECTION, SOLUTION INTRAVENOUS at 15:18

## 2019-02-28 RX ADMIN — SODIUM CHLORIDE 500 ML: 9 INJECTION, SOLUTION INTRAVENOUS at 19:10

## 2019-02-28 RX ADMIN — ACETAMINOPHEN 650 MG: 325 TABLET, FILM COATED ORAL at 23:23

## 2019-02-28 RX ADMIN — SODIUM CHLORIDE 2000 ML: 9 INJECTION, SOLUTION INTRAVENOUS at 20:24

## 2019-02-28 RX ADMIN — ACYCLOVIR 400 MG: 400 TABLET ORAL at 23:23

## 2019-02-28 RX ADMIN — MAGNESIUM SULFATE HEPTAHYDRATE 1 G: 1 INJECTION, SOLUTION INTRAVENOUS at 17:38

## 2019-02-28 RX ADMIN — KETOROLAC TROMETHAMINE 15 MG: 15 INJECTION, SOLUTION INTRAMUSCULAR; INTRAVENOUS at 16:07

## 2019-02-28 RX ADMIN — INSULIN LISPRO 1 UNITS: 100 INJECTION, SOLUTION INTRAVENOUS; SUBCUTANEOUS at 23:24

## 2019-02-28 RX ADMIN — Medication 800 MG: at 23:23

## 2019-02-28 RX ADMIN — INSULIN GLARGINE 35 UNITS: 100 INJECTION, SOLUTION SUBCUTANEOUS at 23:23

## 2019-02-28 RX ADMIN — METOPROLOL TARTRATE 100 MG: 50 TABLET ORAL at 23:23

## 2019-02-28 RX ADMIN — OSELTAMIVIR PHOSPHATE 75 MG: 75 CAPSULE ORAL at 20:25

## 2019-02-28 ASSESSMENT — PAIN SCALES - GENERAL
PAINLEVEL_OUTOF10: 4
PAINLEVEL_OUTOF10: 6
PAINLEVEL_OUTOF10: 8
PAINLEVEL_OUTOF10: 6
PAINLEVEL_OUTOF10: 5

## 2019-02-28 ASSESSMENT — PAIN DESCRIPTION - FREQUENCY
FREQUENCY: CONTINUOUS
FREQUENCY: CONTINUOUS
FREQUENCY: INTERMITTENT

## 2019-02-28 ASSESSMENT — PAIN DESCRIPTION - ONSET
ONSET: GRADUAL
ONSET: ON-GOING
ONSET: ON-GOING

## 2019-02-28 ASSESSMENT — ENCOUNTER SYMPTOMS
NAUSEA: 0
SHORTNESS OF BREATH: 0
COUGH: 0
CHEST TIGHTNESS: 0
ABDOMINAL PAIN: 0
DIARRHEA: 0
CONSTIPATION: 0
RHINORRHEA: 0
VOMITING: 0

## 2019-02-28 ASSESSMENT — PAIN DESCRIPTION - PROGRESSION
CLINICAL_PROGRESSION: GRADUALLY WORSENING
CLINICAL_PROGRESSION: GRADUALLY IMPROVING
CLINICAL_PROGRESSION: GRADUALLY WORSENING

## 2019-02-28 ASSESSMENT — PAIN DESCRIPTION - LOCATION
LOCATION: KNEE
LOCATION: KNEE
LOCATION: KNEE;GENERALIZED

## 2019-02-28 ASSESSMENT — PAIN - FUNCTIONAL ASSESSMENT
PAIN_FUNCTIONAL_ASSESSMENT: PREVENTS OR INTERFERES WITH MANY ACTIVE NOT PASSIVE ACTIVITIES

## 2019-02-28 ASSESSMENT — PAIN DESCRIPTION - DESCRIPTORS
DESCRIPTORS: SHARP;STABBING;SORE
DESCRIPTORS: ACHING;DULL
DESCRIPTORS: ACHING;DULL;SORE

## 2019-02-28 ASSESSMENT — PAIN DESCRIPTION - PAIN TYPE
TYPE: ACUTE PAIN

## 2019-02-28 ASSESSMENT — PAIN DESCRIPTION - ORIENTATION
ORIENTATION: LEFT

## 2019-03-01 ENCOUNTER — APPOINTMENT (OUTPATIENT)
Dept: GENERAL RADIOLOGY | Age: 64
DRG: 554 | End: 2019-03-01
Payer: MEDICARE

## 2019-03-01 VITALS
HEART RATE: 78 BPM | HEIGHT: 71 IN | BODY MASS INDEX: 41.02 KG/M2 | WEIGHT: 293 LBS | RESPIRATION RATE: 18 BRPM | SYSTOLIC BLOOD PRESSURE: 186 MMHG | DIASTOLIC BLOOD PRESSURE: 66 MMHG | TEMPERATURE: 98.6 F | OXYGEN SATURATION: 98 %

## 2019-03-01 LAB
ANION GAP SERPL CALCULATED.3IONS-SCNC: 9 MMOL/L (ref 7–16)
BUN BLDV-MCNC: 17 MG/DL (ref 8–23)
CALCIUM SERPL-MCNC: 8.8 MG/DL (ref 8.6–10.2)
CHLORIDE BLD-SCNC: 104 MMOL/L (ref 98–107)
CO2: 29 MMOL/L (ref 22–29)
CREAT SERPL-MCNC: 0.9 MG/DL (ref 0.5–1)
EKG ATRIAL RATE: 71 BPM
EKG P AXIS: 22 DEGREES
EKG P-R INTERVAL: 172 MS
EKG Q-T INTERVAL: 412 MS
EKG QRS DURATION: 64 MS
EKG QTC CALCULATION (BAZETT): 447 MS
EKG R AXIS: 0 DEGREES
EKG T AXIS: 60 DEGREES
EKG VENTRICULAR RATE: 71 BPM
GFR AFRICAN AMERICAN: >60
GFR NON-AFRICAN AMERICAN: >60 ML/MIN/1.73
GLUCOSE BLD-MCNC: 196 MG/DL (ref 74–99)
MAGNESIUM: 1.7 MG/DL (ref 1.6–2.6)
METER GLUCOSE: 178 MG/DL (ref 74–99)
METER GLUCOSE: 199 MG/DL (ref 74–99)
METER GLUCOSE: 225 MG/DL (ref 74–99)
PHOSPHORUS: 4.2 MG/DL (ref 2.5–4.5)
POTASSIUM SERPL-SCNC: 3.7 MMOL/L (ref 3.5–5)
SODIUM BLD-SCNC: 142 MMOL/L (ref 132–146)

## 2019-03-01 PROCEDURE — 97530 THERAPEUTIC ACTIVITIES: CPT

## 2019-03-01 PROCEDURE — 97161 PT EVAL LOW COMPLEX 20 MIN: CPT | Performed by: PHYSICAL THERAPIST

## 2019-03-01 PROCEDURE — 97116 GAIT TRAINING THERAPY: CPT | Performed by: PHYSICAL THERAPIST

## 2019-03-01 PROCEDURE — 6370000000 HC RX 637 (ALT 250 FOR IP): Performed by: INTERNAL MEDICINE

## 2019-03-01 PROCEDURE — 36415 COLL VENOUS BLD VENIPUNCTURE: CPT

## 2019-03-01 PROCEDURE — 84100 ASSAY OF PHOSPHORUS: CPT

## 2019-03-01 PROCEDURE — 2500000003 HC RX 250 WO HCPCS: Performed by: STUDENT IN AN ORGANIZED HEALTH CARE EDUCATION/TRAINING PROGRAM

## 2019-03-01 PROCEDURE — 6360000002 HC RX W HCPCS: Performed by: INTERNAL MEDICINE

## 2019-03-01 PROCEDURE — 3E0U3BZ INTRODUCTION OF ANESTHETIC AGENT INTO JOINTS, PERCUTANEOUS APPROACH: ICD-10-PCS | Performed by: ORTHOPAEDIC SURGERY

## 2019-03-01 PROCEDURE — 80048 BASIC METABOLIC PNL TOTAL CA: CPT

## 2019-03-01 PROCEDURE — 97165 OT EVAL LOW COMPLEX 30 MIN: CPT

## 2019-03-01 PROCEDURE — 82962 GLUCOSE BLOOD TEST: CPT

## 2019-03-01 PROCEDURE — 2500000003 HC RX 250 WO HCPCS

## 2019-03-01 PROCEDURE — 3E0U33Z INTRODUCTION OF ANTI-INFLAMMATORY INTO JOINTS, PERCUTANEOUS APPROACH: ICD-10-PCS | Performed by: ORTHOPAEDIC SURGERY

## 2019-03-01 PROCEDURE — 73562 X-RAY EXAM OF KNEE 3: CPT

## 2019-03-01 PROCEDURE — 83735 ASSAY OF MAGNESIUM: CPT

## 2019-03-01 PROCEDURE — 6360000002 HC RX W HCPCS: Performed by: STUDENT IN AN ORGANIZED HEALTH CARE EDUCATION/TRAINING PROGRAM

## 2019-03-01 RX ORDER — LIDOCAINE HYDROCHLORIDE 10 MG/ML
10 INJECTION, SOLUTION EPIDURAL; INFILTRATION; INTRACAUDAL; PERINEURAL SEE ADMIN INSTRUCTIONS
Status: DISCONTINUED | OUTPATIENT
Start: 2019-03-01 | End: 2019-03-01 | Stop reason: HOSPADM

## 2019-03-01 RX ORDER — TRIAMCINOLONE ACETONIDE 40 MG/ML
40 INJECTION, SUSPENSION INTRA-ARTICULAR; INTRAMUSCULAR ONCE
Status: COMPLETED | OUTPATIENT
Start: 2019-03-01 | End: 2019-03-01

## 2019-03-01 RX ORDER — OSELTAMIVIR PHOSPHATE 75 MG/1
75 CAPSULE ORAL 2 TIMES DAILY
Qty: 9 CAPSULE | Refills: 0
Start: 2019-03-01 | End: 2019-03-06

## 2019-03-01 RX ORDER — SODIUM CHLORIDE 0.9 % (FLUSH) 0.9 %
10 SYRINGE (ML) INJECTION PRN
Status: DISCONTINUED | OUTPATIENT
Start: 2019-03-01 | End: 2019-03-01 | Stop reason: HOSPADM

## 2019-03-01 RX ORDER — OSELTAMIVIR PHOSPHATE 75 MG/1
75 CAPSULE ORAL 2 TIMES DAILY
Status: DISCONTINUED | OUTPATIENT
Start: 2019-03-01 | End: 2019-03-01 | Stop reason: HOSPADM

## 2019-03-01 RX ORDER — LIDOCAINE HYDROCHLORIDE 10 MG/ML
INJECTION, SOLUTION INFILTRATION; PERINEURAL
Status: COMPLETED
Start: 2019-03-01 | End: 2019-03-01

## 2019-03-01 RX ORDER — BUPIVACAINE HYDROCHLORIDE 2.5 MG/ML
30 INJECTION, SOLUTION EPIDURAL; INFILTRATION; INTRACAUDAL ONCE
Status: COMPLETED | OUTPATIENT
Start: 2019-03-01 | End: 2019-03-01

## 2019-03-01 RX ADMIN — ASPIRIN 81 MG 81 MG: 81 TABLET ORAL at 10:52

## 2019-03-01 RX ADMIN — INSULIN LISPRO 2 UNITS: 100 INJECTION, SOLUTION INTRAVENOUS; SUBCUTANEOUS at 08:19

## 2019-03-01 RX ADMIN — DICLOFENAC 2 G: 10 GEL TOPICAL at 15:00

## 2019-03-01 RX ADMIN — METFORMIN HYDROCHLORIDE 1000 MG: 1000 TABLET, FILM COATED ORAL at 16:37

## 2019-03-01 RX ADMIN — ACETAMINOPHEN 650 MG: 325 TABLET, FILM COATED ORAL at 16:37

## 2019-03-01 RX ADMIN — PANTOPRAZOLE SODIUM 40 MG: 40 TABLET, DELAYED RELEASE ORAL at 12:30

## 2019-03-01 RX ADMIN — TRIAMCINOLONE ACETONIDE 40 MG: 40 INJECTION, SUSPENSION INTRA-ARTICULAR; INTRAMUSCULAR at 14:59

## 2019-03-01 RX ADMIN — OSELTAMIVIR PHOSPHATE 75 MG: 75 CAPSULE ORAL at 10:47

## 2019-03-01 RX ADMIN — INSULIN LISPRO 2 UNITS: 100 INJECTION, SOLUTION INTRAVENOUS; SUBCUTANEOUS at 11:43

## 2019-03-01 RX ADMIN — Medication 800 MG: at 14:30

## 2019-03-01 RX ADMIN — OXYCODONE HYDROCHLORIDE 5 MG: 5 TABLET ORAL at 06:16

## 2019-03-01 RX ADMIN — DICLOFENAC 2 G: 10 GEL TOPICAL at 16:37

## 2019-03-01 RX ADMIN — ACETAMINOPHEN 650 MG: 325 TABLET, FILM COATED ORAL at 06:19

## 2019-03-01 RX ADMIN — ACYCLOVIR 400 MG: 400 TABLET ORAL at 10:52

## 2019-03-01 RX ADMIN — ATORVASTATIN CALCIUM 80 MG: 40 TABLET, FILM COATED ORAL at 10:51

## 2019-03-01 RX ADMIN — Medication 100 UNITS: at 16:52

## 2019-03-01 RX ADMIN — METFORMIN HYDROCHLORIDE 1000 MG: 1000 TABLET, FILM COATED ORAL at 10:52

## 2019-03-01 RX ADMIN — TRIAMCINOLONE ACETONIDE 40 MG: 40 INJECTION, SUSPENSION INTRA-ARTICULAR; INTRAMUSCULAR at 12:30

## 2019-03-01 RX ADMIN — DOCUSATE SODIUM 100 MG: 100 CAPSULE, LIQUID FILLED ORAL at 10:51

## 2019-03-01 RX ADMIN — METOPROLOL TARTRATE 100 MG: 50 TABLET ORAL at 10:50

## 2019-03-01 RX ADMIN — ISOSORBIDE MONONITRATE 30 MG: 30 TABLET, EXTENDED RELEASE ORAL at 10:57

## 2019-03-01 RX ADMIN — DICLOFENAC 2 G: 10 GEL TOPICAL at 10:46

## 2019-03-01 RX ADMIN — BUPIVACAINE HYDROCHLORIDE 75 MG: 2.5 INJECTION, SOLUTION EPIDURAL; INFILTRATION; INTRACAUDAL; PERINEURAL at 12:30

## 2019-03-01 RX ADMIN — INSULIN LISPRO 4 UNITS: 100 INJECTION, SOLUTION INTRAVENOUS; SUBCUTANEOUS at 16:38

## 2019-03-01 RX ADMIN — LIDOCAINE HYDROCHLORIDE: 10 INJECTION, SOLUTION INFILTRATION; PERINEURAL at 15:01

## 2019-03-01 RX ADMIN — LOSARTAN POTASSIUM 100 MG: 50 TABLET ORAL at 10:51

## 2019-03-01 RX ADMIN — FLUCONAZOLE 400 MG: 150 TABLET ORAL at 10:48

## 2019-03-01 RX ADMIN — Medication 800 MG: at 10:50

## 2019-03-01 RX ADMIN — ACETAMINOPHEN 650 MG: 325 TABLET, FILM COATED ORAL at 10:50

## 2019-03-01 ASSESSMENT — PAIN SCALES - GENERAL
PAINLEVEL_OUTOF10: 7
PAINLEVEL_OUTOF10: 0
PAINLEVEL_OUTOF10: 7
PAINLEVEL_OUTOF10: 4
PAINLEVEL_OUTOF10: 0
PAINLEVEL_OUTOF10: 5
PAINLEVEL_OUTOF10: 0
PAINLEVEL_OUTOF10: 7
PAINLEVEL_OUTOF10: 4

## 2019-03-01 ASSESSMENT — PAIN DESCRIPTION - DESCRIPTORS: DESCRIPTORS: ACHING;DISCOMFORT

## 2019-03-01 ASSESSMENT — PAIN DESCRIPTION - ORIENTATION: ORIENTATION: RIGHT;LEFT

## 2019-03-01 ASSESSMENT — PAIN DESCRIPTION - PAIN TYPE: TYPE: CHRONIC PAIN

## 2019-03-01 ASSESSMENT — PAIN DESCRIPTION - LOCATION: LOCATION: LEG

## 2019-03-07 DIAGNOSIS — C92.00 ACUTE MYELOID LEUKEMIA NOT HAVING ACHIEVED REMISSION (HCC): Primary | ICD-10-CM

## 2019-03-08 ENCOUNTER — HOSPITAL ENCOUNTER (OUTPATIENT)
Dept: INFUSION THERAPY | Age: 64
Discharge: HOME | End: 2019-03-08
Payer: MEDICARE

## 2019-03-08 DIAGNOSIS — C92.00 ACUTE MYELOID LEUKEMIA NOT HAVING ACHIEVED REMISSION (HCC): ICD-10-CM

## 2019-03-08 DIAGNOSIS — C95.00 ACUTE LEUKEMIA NOT HAVING ACHIEVED REMISSION (HCC): Primary | ICD-10-CM

## 2019-03-08 LAB
ALBUMIN SERPL-MCNC: 3.7 G/DL (ref 3.5–5.2)
ALP BLD-CCNC: 90 U/L (ref 35–104)
ALT SERPL-CCNC: 10 U/L (ref 0–32)
ANION GAP SERPL CALCULATED.3IONS-SCNC: 11 MMOL/L (ref 7–16)
AST SERPL-CCNC: 9 U/L (ref 0–31)
BASOPHILS ABSOLUTE: 0.09 E9/L (ref 0–0.2)
BASOPHILS RELATIVE PERCENT: 0.7 % (ref 0–2)
BILIRUB SERPL-MCNC: 0.3 MG/DL (ref 0–1.2)
BUN BLDV-MCNC: 26 MG/DL (ref 8–23)
CALCIUM SERPL-MCNC: 9.4 MG/DL (ref 8.6–10.2)
CHLORIDE BLD-SCNC: 100 MMOL/L (ref 98–107)
CO2: 28 MMOL/L (ref 22–29)
CREAT SERPL-MCNC: 0.9 MG/DL (ref 0.5–1)
EOSINOPHILS ABSOLUTE: 0.59 E9/L (ref 0.05–0.5)
EOSINOPHILS RELATIVE PERCENT: 4.4 % (ref 0–6)
GFR AFRICAN AMERICAN: >60
GFR NON-AFRICAN AMERICAN: >60 ML/MIN/1.73
GLUCOSE BLD-MCNC: 258 MG/DL (ref 74–99)
HCT VFR BLD CALC: 33.7 % (ref 34–48)
HEMOGLOBIN: 11.2 G/DL (ref 11.5–15.5)
IMMATURE GRANULOCYTES #: 0.07 E9/L
IMMATURE GRANULOCYTES %: 0.5 % (ref 0–5)
LYMPHOCYTES ABSOLUTE: 1.57 E9/L (ref 1.5–4)
LYMPHOCYTES RELATIVE PERCENT: 11.6 % (ref 20–42)
MAGNESIUM: 1.4 MG/DL (ref 1.6–2.6)
MCH RBC QN AUTO: 32.7 PG (ref 26–35)
MCHC RBC AUTO-ENTMCNC: 33.2 % (ref 32–34.5)
MCV RBC AUTO: 98.3 FL (ref 80–99.9)
MONOCYTES ABSOLUTE: 1.15 E9/L (ref 0.1–0.95)
MONOCYTES RELATIVE PERCENT: 8.5 % (ref 2–12)
NEUTROPHILS ABSOLUTE: 10.06 E9/L (ref 1.8–7.3)
NEUTROPHILS RELATIVE PERCENT: 74.3 % (ref 43–80)
PDW BLD-RTO: 17.5 FL (ref 11.5–15)
PLATELET # BLD: 179 E9/L (ref 130–450)
PMV BLD AUTO: 10.5 FL (ref 7–12)
POTASSIUM SERPL-SCNC: 3.7 MMOL/L (ref 3.5–5)
RBC # BLD: 3.43 E12/L (ref 3.5–5.5)
SODIUM BLD-SCNC: 139 MMOL/L (ref 132–146)
TOTAL PROTEIN: 7 G/DL (ref 6.4–8.3)
WBC # BLD: 13.5 E9/L (ref 4.5–11.5)

## 2019-03-08 PROCEDURE — 2580000003 HC RX 258: Performed by: NURSE PRACTITIONER

## 2019-03-08 PROCEDURE — 36591 DRAW BLOOD OFF VENOUS DEVICE: CPT

## 2019-03-08 PROCEDURE — 80053 COMPREHEN METABOLIC PANEL: CPT

## 2019-03-08 PROCEDURE — 6360000002 HC RX W HCPCS: Performed by: NURSE PRACTITIONER

## 2019-03-08 PROCEDURE — 85025 COMPLETE CBC W/AUTO DIFF WBC: CPT

## 2019-03-08 PROCEDURE — 83735 ASSAY OF MAGNESIUM: CPT

## 2019-03-08 RX ORDER — HEPARIN SODIUM (PORCINE) LOCK FLUSH IV SOLN 100 UNIT/ML 100 UNIT/ML
500 SOLUTION INTRAVENOUS PRN
Status: DISCONTINUED | OUTPATIENT
Start: 2019-03-08 | End: 2019-03-09 | Stop reason: HOSPADM

## 2019-03-08 RX ORDER — SODIUM CHLORIDE 0.9 % (FLUSH) 0.9 %
10 SYRINGE (ML) INJECTION PRN
Status: CANCELLED | OUTPATIENT
Start: 2019-03-08

## 2019-03-08 RX ORDER — SODIUM CHLORIDE 0.9 % (FLUSH) 0.9 %
20 SYRINGE (ML) INJECTION PRN
Status: CANCELLED | OUTPATIENT
Start: 2019-03-08

## 2019-03-08 RX ORDER — SODIUM CHLORIDE 0.9 % (FLUSH) 0.9 %
10 SYRINGE (ML) INJECTION PRN
Status: DISCONTINUED | OUTPATIENT
Start: 2019-03-08 | End: 2019-03-09 | Stop reason: HOSPADM

## 2019-03-08 RX ORDER — HEPARIN SODIUM (PORCINE) LOCK FLUSH IV SOLN 100 UNIT/ML 100 UNIT/ML
500 SOLUTION INTRAVENOUS PRN
Status: CANCELLED | OUTPATIENT
Start: 2019-03-08

## 2019-03-08 RX ADMIN — Medication 500 UNITS: at 09:37

## 2019-03-08 RX ADMIN — Medication 10 ML: at 09:37

## 2019-03-08 NOTE — PROGRESS NOTES
PORT FLUSH / LAB DRAW    Patient presents to clinic for [facility] today. 20  SQ port accessed per policy using 5.60 Espinoza needle for good blood return. Aspirate for waste and specimen sent to lab. Site flushed easily with 10 mL NSS followed by 5 mL Heparin solution 100 units/ml rinse prior to de-access. Dry sterile dressing to area. Tolerated procedure well. Encouraged to schedule port flush every 4 weeks.      Tello Chinmay

## 2019-03-08 NOTE — PROGRESS NOTES
Magnesium level of 1.4 was relayed to Liz Carranza NP and she called her Oncologist in [facility] with no answer and no return call. Patient was anxious to leave and states she does take oral magnesium. Told patient to try and reach her Physician herself as well to see if there is any further instruction to help to increase her magnesium level. Patient was agreeable.     Macho Laguna

## 2019-05-02 ENCOUNTER — HOSPITAL ENCOUNTER (OUTPATIENT)
Dept: INFUSION THERAPY | Age: 64
Discharge: HOME | End: 2019-05-02
Payer: MEDICARE

## 2019-05-02 ENCOUNTER — OFFICE VISIT (OUTPATIENT)
Dept: ONCOLOGY | Age: 64
End: 2019-05-02
Payer: MEDICARE

## 2019-05-02 VITALS
TEMPERATURE: 97.5 F | SYSTOLIC BLOOD PRESSURE: 128 MMHG | HEART RATE: 92 BPM | OXYGEN SATURATION: 96 % | RESPIRATION RATE: 18 BRPM | DIASTOLIC BLOOD PRESSURE: 67 MMHG

## 2019-05-02 VITALS
DIASTOLIC BLOOD PRESSURE: 88 MMHG | SYSTOLIC BLOOD PRESSURE: 147 MMHG | RESPIRATION RATE: 20 BRPM | HEIGHT: 71 IN | TEMPERATURE: 98.9 F | BODY MASS INDEX: 43.52 KG/M2 | HEART RATE: 98 BPM

## 2019-05-02 DIAGNOSIS — C95.00 ACUTE LEUKEMIA NOT HAVING ACHIEVED REMISSION (HCC): ICD-10-CM

## 2019-05-02 DIAGNOSIS — C92.00 ACUTE MYELOID LEUKEMIA NOT HAVING ACHIEVED REMISSION (HCC): Primary | ICD-10-CM

## 2019-05-02 DIAGNOSIS — E61.2 MAGNESIUM DEFICIENCY: ICD-10-CM

## 2019-05-02 LAB
ABO/RH: NORMAL
ALBUMIN SERPL-MCNC: 3.5 G/DL (ref 3.5–5.2)
ALP BLD-CCNC: 80 U/L (ref 35–104)
ALT SERPL-CCNC: 18 U/L (ref 0–32)
ANION GAP SERPL CALCULATED.3IONS-SCNC: 10 MMOL/L (ref 7–16)
ANTIBODY SCREEN: NORMAL
AST SERPL-CCNC: 10 U/L (ref 0–31)
BASOPHILS ABSOLUTE: 0 E9/L (ref 0–0.2)
BASOPHILS RELATIVE PERCENT: 0 % (ref 0–2)
BILIRUB SERPL-MCNC: 0.6 MG/DL (ref 0–1.2)
BLOOD BANK DISPENSE STATUS: NORMAL
BLOOD BANK PRODUCT CODE: NORMAL
BPU ID: NORMAL
BUN BLDV-MCNC: 23 MG/DL (ref 8–23)
CALCIUM SERPL-MCNC: 9.4 MG/DL (ref 8.6–10.2)
CHLORIDE BLD-SCNC: 99 MMOL/L (ref 98–107)
CO2: 26 MMOL/L (ref 22–29)
CREAT SERPL-MCNC: 0.9 MG/DL (ref 0.5–1)
DESCRIPTION BLOOD BANK: NORMAL
EOSINOPHILS ABSOLUTE: 0 E9/L (ref 0.05–0.5)
EOSINOPHILS RELATIVE PERCENT: 0 % (ref 0–6)
GFR AFRICAN AMERICAN: >60
GFR NON-AFRICAN AMERICAN: >60 ML/MIN/1.73
GLUCOSE BLD-MCNC: 281 MG/DL (ref 74–99)
HCT VFR BLD CALC: 25.5 % (ref 34–48)
HEMOGLOBIN: 8.8 G/DL (ref 11.5–15.5)
LYMPHOCYTES ABSOLUTE: 0.2 E9/L (ref 1.5–4)
LYMPHOCYTES RELATIVE PERCENT: 98 % (ref 20–42)
MAGNESIUM: 1.4 MG/DL (ref 1.6–2.6)
MCH RBC QN AUTO: 34 PG (ref 26–35)
MCHC RBC AUTO-ENTMCNC: 34.5 % (ref 32–34.5)
MCV RBC AUTO: 98.5 FL (ref 80–99.9)
MONOCYTES ABSOLUTE: 0 E9/L (ref 0.1–0.95)
MONOCYTES RELATIVE PERCENT: 2 % (ref 2–12)
NEUTROPHILS ABSOLUTE: 0 E9/L (ref 1.8–7.3)
NEUTROPHILS RELATIVE PERCENT: 0 % (ref 43–80)
OVALOCYTES: ABNORMAL
PDW BLD-RTO: 12.5 FL (ref 11.5–15)
PLATELET # BLD: 7 E9/L (ref 130–450)
PLATELET CONFIRMATION: NORMAL
PMV BLD AUTO: 10.2 FL (ref 7–12)
POIKILOCYTES: ABNORMAL
POTASSIUM SERPL-SCNC: 4.2 MMOL/L (ref 3.5–5)
RBC # BLD: 2.59 E12/L (ref 3.5–5.5)
SODIUM BLD-SCNC: 135 MMOL/L (ref 132–146)
TOTAL PROTEIN: 6.2 G/DL (ref 6.4–8.3)
WBC # BLD: 0.2 E9/L (ref 4.5–11.5)

## 2019-05-02 PROCEDURE — 2580000003 HC RX 258: Performed by: NURSE PRACTITIONER

## 2019-05-02 PROCEDURE — 86850 RBC ANTIBODY SCREEN: CPT

## 2019-05-02 PROCEDURE — 96365 THER/PROPH/DIAG IV INF INIT: CPT

## 2019-05-02 PROCEDURE — 99214 OFFICE O/P EST MOD 30 MIN: CPT | Performed by: INTERNAL MEDICINE

## 2019-05-02 PROCEDURE — P9037 PLATE PHERES LEUKOREDU IRRAD: HCPCS

## 2019-05-02 PROCEDURE — 96366 THER/PROPH/DIAG IV INF ADDON: CPT

## 2019-05-02 PROCEDURE — 80053 COMPREHEN METABOLIC PANEL: CPT

## 2019-05-02 PROCEDURE — 6360000002 HC RX W HCPCS: Performed by: INTERNAL MEDICINE

## 2019-05-02 PROCEDURE — 86901 BLOOD TYPING SEROLOGIC RH(D): CPT

## 2019-05-02 PROCEDURE — 85025 COMPLETE CBC W/AUTO DIFF WBC: CPT

## 2019-05-02 PROCEDURE — 36430 TRANSFUSION BLD/BLD COMPNT: CPT

## 2019-05-02 PROCEDURE — 6360000002 HC RX W HCPCS: Performed by: NURSE PRACTITIONER

## 2019-05-02 PROCEDURE — 2580000003 HC RX 258: Performed by: INTERNAL MEDICINE

## 2019-05-02 PROCEDURE — 86900 BLOOD TYPING SEROLOGIC ABO: CPT

## 2019-05-02 PROCEDURE — 83735 ASSAY OF MAGNESIUM: CPT

## 2019-05-02 RX ORDER — HEPARIN SODIUM (PORCINE) LOCK FLUSH IV SOLN 100 UNIT/ML 100 UNIT/ML
500 SOLUTION INTRAVENOUS PRN
Status: DISCONTINUED | OUTPATIENT
Start: 2019-05-02 | End: 2019-05-03 | Stop reason: HOSPADM

## 2019-05-02 RX ORDER — LEVOFLOXACIN 500 MG/1
500 TABLET, FILM COATED ORAL DAILY
Qty: 30 TABLET | Refills: 0 | Status: SHIPPED | OUTPATIENT
Start: 2019-05-02 | End: 2019-06-01

## 2019-05-02 RX ORDER — HEPARIN SODIUM (PORCINE) LOCK FLUSH IV SOLN 100 UNIT/ML 100 UNIT/ML
500 SOLUTION INTRAVENOUS PRN
Status: CANCELLED | OUTPATIENT
Start: 2019-05-02

## 2019-05-02 RX ORDER — EPINEPHRINE 1 MG/ML
0.3 INJECTION, SOLUTION, CONCENTRATE INTRAVENOUS PRN
Status: CANCELLED | OUTPATIENT
Start: 2019-05-02

## 2019-05-02 RX ORDER — PREDNISOLONE ACETATE 10 MG/ML
1 SUSPENSION/ DROPS OPHTHALMIC 4 TIMES DAILY
COMMUNITY
End: 2019-07-11 | Stop reason: ALTCHOICE

## 2019-05-02 RX ORDER — MAGNESIUM SULFATE IN WATER 40 MG/ML
2 INJECTION, SOLUTION INTRAVENOUS ONCE
Status: COMPLETED | OUTPATIENT
Start: 2019-05-02 | End: 2019-05-02

## 2019-05-02 RX ORDER — SODIUM CHLORIDE 0.9 % (FLUSH) 0.9 %
20 SYRINGE (ML) INJECTION PRN
Status: CANCELLED | OUTPATIENT
Start: 2019-05-02

## 2019-05-02 RX ORDER — 0.9 % SODIUM CHLORIDE 0.9 %
10 VIAL (ML) INJECTION ONCE
Status: CANCELLED | OUTPATIENT
Start: 2019-05-02

## 2019-05-02 RX ORDER — SODIUM CHLORIDE 9 MG/ML
INJECTION, SOLUTION INTRAVENOUS CONTINUOUS
Status: DISCONTINUED | OUTPATIENT
Start: 2019-05-02 | End: 2019-05-03 | Stop reason: HOSPADM

## 2019-05-02 RX ORDER — MAGNESIUM SULFATE IN WATER 40 MG/ML
2 INJECTION, SOLUTION INTRAVENOUS ONCE
Status: CANCELLED
Start: 2019-05-02

## 2019-05-02 RX ORDER — DIPHENHYDRAMINE HYDROCHLORIDE 50 MG/ML
50 INJECTION INTRAMUSCULAR; INTRAVENOUS ONCE
Status: CANCELLED | OUTPATIENT
Start: 2019-05-02

## 2019-05-02 RX ORDER — METHYLPREDNISOLONE SODIUM SUCCINATE 125 MG/2ML
125 INJECTION, POWDER, LYOPHILIZED, FOR SOLUTION INTRAMUSCULAR; INTRAVENOUS ONCE
Status: CANCELLED | OUTPATIENT
Start: 2019-05-02

## 2019-05-02 RX ORDER — SODIUM CHLORIDE 9 MG/ML
INJECTION, SOLUTION INTRAVENOUS CONTINUOUS
Status: CANCELLED | OUTPATIENT
Start: 2019-05-02

## 2019-05-02 RX ORDER — CIPROFLOXACIN 500 MG/1
500 TABLET, FILM COATED ORAL 2 TIMES DAILY
COMMUNITY
End: 2019-05-02 | Stop reason: SINTOL

## 2019-05-02 RX ORDER — SODIUM CHLORIDE 0.9 % (FLUSH) 0.9 %
10 SYRINGE (ML) INJECTION PRN
Status: CANCELLED | OUTPATIENT
Start: 2019-05-02

## 2019-05-02 RX ORDER — DULOXETIN HYDROCHLORIDE 30 MG/1
30 CAPSULE, DELAYED RELEASE ORAL DAILY
COMMUNITY
End: 2019-07-11 | Stop reason: ALTCHOICE

## 2019-05-02 RX ORDER — SODIUM CHLORIDE 9 MG/ML
INJECTION, SOLUTION INTRAVENOUS CONTINUOUS
Status: CANCELLED
Start: 2019-05-02

## 2019-05-02 RX ORDER — SODIUM CHLORIDE 0.9 % (FLUSH) 0.9 %
10 SYRINGE (ML) INJECTION PRN
Status: DISCONTINUED | OUTPATIENT
Start: 2019-05-02 | End: 2019-05-03 | Stop reason: HOSPADM

## 2019-05-02 RX ADMIN — Medication 10 ML: at 15:14

## 2019-05-02 RX ADMIN — Medication 500 UNITS: at 15:13

## 2019-05-02 RX ADMIN — MAGNESIUM SULFATE HEPTAHYDRATE 2 G: 40 INJECTION, SOLUTION INTRAVENOUS at 11:14

## 2019-05-02 RX ADMIN — SODIUM CHLORIDE: 9 INJECTION, SOLUTION INTRAVENOUS at 11:14

## 2019-05-02 NOTE — PROGRESS NOTES
Patient received 1 unit(s) of PLATELETS. Transfusion was tolerated well and the patient was observed for 30 minutes prior to discharge.

## 2019-05-02 NOTE — PROGRESS NOTES
induction chemotherapy with 7+3.     She was admitted from 1/21/19-02/12/2019 for standard induction with idarubicin and cytarabine on 7+3 schedule.      Her chemotherapy course overall went well,was complicated by hyperglycemia. She was admitted 02/28/2019 for influenza and pneumonia.      She had a bone marrow biopsy on 03/11/2019 confirms complete remission by histology,flow and FISH studies. She received consolidation cycle #1 given 04/22 -04/27 with modified high dose cytarabine.     She saw Dr Faiza Dang on 04/29/2019. She had her neulasta inj. She states she feels weak. She stopped her ciprofloxacin due to diarrhea. No bleeding or fever.     Review of Systems;  CONSTITUTIONAL: No fever, chills. Fair appetite, she is weak. ENMT: Eyes: No diplopia; Nose: No epistaxis. Mouth: No sore throat. RESPIRATORY: No hemoptysis, shortness of breath, cough. CARDIOVASCULAR: No chest pain, palpitations. GASTROINTESTINAL: No nausea/vomiting, abdominal pain,+ve diarrhea,no constipation. GENITOURINARY: No dysuria, urinary frequency, hematuria. NEURO: No syncope, presyncope, headache.   Remainder:  ROS NEGATIVE     Past Medical History:  Past Medical History        Diagnosis Date    CAD (coronary artery disease)      Cancer ([address]      GERD (gastroesophageal reflux disease)      Hyperlipidemia      Hypertension      Obesity      Osteoarthritis      Type 2 diabetes mellitus without complication (HCC)               Patient Active Problem List   Diagnosis    Acute leukemia not having achieved remission [facility]    Generalized weakness    Sepsis ([facility] [address] [facility]         Past Surgical History:  Past Surgical History             Procedure Laterality Date    CARDIAC CATHETERIZATION        CHOLECYSTECTOMY        DILATION AND CURETTAGE OF UTERUS        FOOT SURGERY Left              Family History:  Family History         Family History   Problem Relation Age of Onset    Coronary Art Dis Mother      Stroke Mother      Diabetes Mother      Coronary Art Dis Father      Diabetes Father      Diabetes Sister      Cancer Paternal Aunt           Bone            Medications:  Reviewed and reconciled.     Social History:  Social History               Socioeconomic History    Marital status:        Spouse name: Not on file    Number of children: Not on file    Years of education: Not on file    Highest education level: Not on file   Occupational History    Not on file   Social Needs    Financial resource strain: Not on file    Food insecurity:       Worry: Not on file       Inability: Not on file    Transportation needs:       Medical: Not on file       Non-medical: Not on file   Tobacco Use    Smoking status: Never Smoker    Smokeless tobacco: Never Used   Substance and Sexual Activity    Alcohol use: No    Drug use: No    Sexual activity: Not Currently       Partners: Male   Lifestyle    Physical activity:       Days per week: Not on file       Minutes per session: Not on file    Stress: Not on file   Relationships    Social connections:       Talks on phone: Not on file       Gets together: Not on file       Attends Yazidism service: Not on file       Active member of club or organization: Not on file       Attends meetings of clubs or organizations: Not on file       Relationship status: Not on file    Intimate partner violence:       Fear of current or ex partner: Not on file       Emotionally abused: Not on file       Physically abused: Not on file       Forced sexual activity: Not on file   Other Topics Concern    Not on file   Social History Narrative    Not on file            Allergies:  No Known Allergies     Physical Exam:  BP (!) 147/88 (Site: Left Lower Arm, Position: Sitting, Cuff Size: Small Adult)   Pulse 98   Temp 98.9 °F (37.2 °C) (Oral)   Resp 20   Ht 5' 11\" (1.803 m)   BMI 43.52 kg/m²   GENERAL: Alert, oriented x 3, not in acute distress. She is in the wheelchair. HEENT: PERRLA; EOMI. Oropharynx clear. Positive for pallor. NECK: Supple. No palpable cervical or supraclavicular lymphadenopathy. LUNGS: Good air entry bilaterally. No wheezing, crackles or rhonchi. CARDIOVASCULAR: Regular rate. No murmurs, rubs or gallops. ABDOMEN: Soft. Non-tender, non-distended. Positive bowel sounds. EXTREMITIES: Bilateral lower leg edema, left greater than right, venous stasis changes. NEUROLOGIC: No focal deficits.      ECOG PS 2-3     Impression/Plan:      The patient is a pleasant 29-year-old lady with a past medical history significant for hypertension, type II diabetes mellitus, hyperlipidemia, coronary artery disease, status post stents placement, morbid obesity, who had presented to [facility] for his near syncopal episode, weakness and profound fatigue, she was found to have a white count of 31.3, hemoglobin was 5.8, platelet count 83,986, she was transferred to [facility] with concern for acute leukemia, she had a bone marrow biopsy done on 11/23/2018, 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 NGS panel- IDH1, IVMJ23, CBL slice site. Due to the patient's D: Addition to this intake, requiring significant assistance with mobility without ECOG 2-3, the patient was treated with azacitidine 75 mg/m² ×7 day course between 1127 and 12/3/2018, no tumor lysis was observed, she was on allopurinol for TLS prophylaxis.        She was admitted from 1/21/19-02/12/2019 for standard induction with idarubicin and cytarabine on 7+3 schedule. Her chemotherapy course overall went well,was complicated by hyperglycemia. She was admitted 02/28/2019 for influenza and pneumonia. She had a bone marrow biopsy on 03/11/2019 confirms complete remission by histology,flow and FISH studies. She received consolidation cycle #1 given 04/22 -04/27 with modified high dose cytarabine.     She saw Dr Palomo Lieberman on 04/29/2019. She had her neulasta shot.     Her platelets

## 2019-05-02 NOTE — PROGRESS NOTES
[address] [facility] [address]  Dept: [phone]  Loc: [phone]  Attending progress Note      Reason for Visit:   AML. Referring Physician: Dr. Maikel Escobar (C/Wali Anaya). PCP:  Kahsif Rincon MD    History of Present Illness: The patient is a pleasant 77-year-old lady with a past medical history significant for hypertension, type II diabetes mellitus, hyperlipidemia, coronary artery disease, status post stents placement, morbid obesity, who had presented to [facility] for his near syncopal episode, weakness and profound fatigue, she was found to have a white count of 31.3, hemoglobin was 5.8, platelet count 66,748, she was transferred to [facility] with concern for acute leukemia, she had a bone marrow biopsy done on 11/23/2018, 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 NGS panel- IDH1, [id number], CBL slice site. Due to the patient's D: Addition to this intake, requiring significant assistance with mobility without ECOG 2-3, the patient was treated with azacitidine 75 mg/m² ×7 day course between 1127 and 12/3/2018, no tumor lysis was observed, she was on allopurinol for TLS prophylaxis, and acyclovir the year and the voriconazole for infection prophylaxis while inpatient, she was discharged on acyclovir, fluconazole and ciprofloxacin, which she has been taking. The patient is feeling tired, no chest pain or shortness of breath, no bleeding. She has lower leg edema, she had venous ultrasounds done they were negative for DVT, she also has history of lower extremities ulcers, she was referred to the wound clinic at [facility] ([facility]3]. The patient saw Dr. Jonny Myles at [facility] on 12/31/2018, and had a BM bx done on 1/3.   She saw Dr. Kyle Edwards on 1/10, the blasts in the bone marrow had decreased to 4%, she will be seen again at [facility] on Thursday, plan for admission on Monday, 1/21/2019 for induction chemotherapy with 7+3. She was admitted from 1/21/19-02/12/2019 for standard induction with idarubicin and cytarabine on 7+3 schedule. Her chemotherapy course overall went well,was complicated by hyperglycemia. She was admitted 02/28/2019 for influenza and pneumonia. She had a bone marrow biopsy on 03/11/2019 confirms complete remission by histology,flow and FISH studies. She received consolidation cycle #1 given 04/22 -04/27 with modified high dose cytarabine. She saw Dr Merlin Kingfisher on 04/29/2019. She had her neulasta inj. She states she feels weak. She stopped her ciprofloxacin due to diarrhea. No bleeding or fever. Review of Systems;  CONSTITUTIONAL: No fever, chills. Fair appetite, she is weak. ENMT: Eyes: No diplopia; Nose: No epistaxis. Mouth: No sore throat. RESPIRATORY: No hemoptysis, shortness of breath, cough. CARDIOVASCULAR: No chest pain, palpitations. GASTROINTESTINAL: No nausea/vomiting, abdominal pain,+ve diarrhea,no constipation. GENITOURINARY: No dysuria, urinary frequency, hematuria. NEURO: No syncope, presyncope, headache.   Remainder:  ROS NEGATIVE    Past Medical History:      Diagnosis Date    CAD (coronary artery disease)     Cancer ([address]     GERD (gastroesophageal reflux disease)     Hyperlipidemia     Hypertension     Obesity     Osteoarthritis     Type 2 diabetes mellitus without complication ([address]      Patient Active Problem List   Diagnosis    Acute leukemia not having achieved remission ([address]    Generalized weakness    Sepsis ([address]        Past Surgical History:      Procedure Laterality Date    CARDIAC CATHETERIZATION      CHOLECYSTECTOMY      DILATION AND CURETTAGE OF UTERUS      FOOT SURGERY Left        Family History:  Family History   Problem Relation Age of Onset    Coronary Art Dis Mother     Stroke Mother     Diabetes Mother     Coronary Art Dis Father     Diabetes Father     Diabetes Sister     Cancer Paternal Aunt         Bone Medications:  Reviewed and reconciled. Social History:  Social History     Socioeconomic History    Marital status:      Spouse name: Not on file    Number of children: Not on file    Years of education: Not on file    Highest education level: Not on file   Occupational History    Not on file   Social Needs    Financial resource strain: Not on file    Food insecurity:     Worry: Not on file     Inability: Not on file    Transportation needs:     Medical: Not on file     Non-medical: Not on file   Tobacco Use    Smoking status: Never Smoker    Smokeless tobacco: Never Used   Substance and Sexual Activity    Alcohol use: No    Drug use: No    Sexual activity: Not Currently     Partners: Male   Lifestyle    Physical activity:     Days per week: Not on file     Minutes per session: Not on file    Stress: Not on file   Relationships    Social connections:     Talks on phone: Not on file     Gets together: Not on file     Attends Jehovah's witness service: Not on file     Active member of club or organization: Not on file     Attends meetings of clubs or organizations: Not on file     Relationship status: Not on file    Intimate partner violence:     Fear of current or ex partner: Not on file     Emotionally abused: Not on file     Physically abused: Not on file     Forced sexual activity: Not on file   Other Topics Concern    Not on file   Social History Narrative    Not on file       Allergies:  No Known Allergies    Physical Exam:  BP (!) 147/88 (Site: Left Lower Arm, Position: Sitting, Cuff Size: Small Adult)   Pulse 98   Temp 98.9 °F (37.2 °C) (Oral)   Resp 20   Ht 5' 11\" (1.803 m)   BMI 43.52 kg/m²   GENERAL: Alert, oriented x 3, not in acute distress. She is in the wheelchair. HEENT: PERRLA; EOMI. Oropharynx clear. Positive for pallor. NECK: Supple. No palpable cervical or supraclavicular lymphadenopathy. LUNGS: Good air entry bilaterally. No wheezing, crackles or rhonchi. CARDIOVASCULAR: Regular rate. No murmurs, rubs or gallops. ABDOMEN: Soft. Non-tender, non-distended. Positive bowel sounds. EXTREMITIES: Bilateral lower leg edema, left greater than right, venous stasis changes. NEUROLOGIC: No focal deficits. ECOG PS 2-3    Impression/Plan:     The patient is a pleasant 51-year-old lady with a past medical history significant for hypertension, type II diabetes mellitus, hyperlipidemia, coronary artery disease, status post stents placement, morbid obesity, who had presented to [facility] for his near syncopal episode, weakness and profound fatigue, she was found to have a white count of 31.3, hemoglobin was 5.8, platelet count 52,121, she was transferred to [facility] with concern for acute leukemia, she had a bone marrow biopsy done on 11/23/2018, 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 NGS panel- IDH1, FOCI27, CBL slice site. Due to the patient's D: Addition to this intake, requiring significant assistance with mobility without ECOG 2-3, the patient was treated with azacitidine 75 mg/m² ×7 day course between 1127 and 12/3/2018, no tumor lysis was observed, she was on allopurinol for TLS prophylaxis. She was admitted from 1/21/19-02/12/2019 for standard induction with idarubicin and cytarabine on 7+3 schedule. Her chemotherapy course overall went well,was complicated by hyperglycemia. She was admitted 02/28/2019 for influenza and pneumonia. She had a bone marrow biopsy on 03/11/2019 confirms complete remission by histology,flow and FISH studies. She received consolidation cycle #1 given 04/22 -04/27 with modified high dose cytarabine. She saw Dr Jerry Lock on 04/29/2019. She had her neulasta shot. Her platelets count today is 7,no bleeding,will order platelets ransfusion (leukoreduced and irradiated). Hypomagnesemia, we'll order IV magnesium sulfate.     For antimicrobial prophylaxis, continue acyclovir, fluconazole and will prescribe Levaquin (did not tolerate  Ciprofloxacin). Instructions were given to seek immediate medical attention if she has any fever or bleeding.     Return next week     Rip Garcia MD  Attending Physician  MD Jackelyn Manzo MD   HEMATOLOGY/MEDICAL [address] [address]  [facility]pt: [facility]: [phone]

## 2019-05-02 NOTE — PROGRESS NOTES
Today's lab work,  CBC CMP and magnesium were faxed to Dr. Israel Alonzo office, with notation the patient will receive 1 dose of platelets today if possible otherwise tomorrow and Dr. Paluine Curran  ordered 2 g of magnesium IV today

## 2019-05-06 ENCOUNTER — HOSPITAL ENCOUNTER (OUTPATIENT)
Dept: INFUSION THERAPY | Age: 64
Discharge: HOME | End: 2019-05-06
Payer: MEDICARE

## 2019-05-06 ENCOUNTER — OFFICE VISIT (OUTPATIENT)
Dept: ONCOLOGY | Age: 64
End: 2019-05-06
Payer: MEDICARE

## 2019-05-06 VITALS
SYSTOLIC BLOOD PRESSURE: 128 MMHG | TEMPERATURE: 98.1 F | RESPIRATION RATE: 18 BRPM | DIASTOLIC BLOOD PRESSURE: 58 MMHG | HEART RATE: 95 BPM

## 2019-05-06 VITALS
BODY MASS INDEX: 41.02 KG/M2 | TEMPERATURE: 98.4 F | RESPIRATION RATE: 20 BRPM | DIASTOLIC BLOOD PRESSURE: 73 MMHG | WEIGHT: 293 LBS | HEART RATE: 97 BPM | SYSTOLIC BLOOD PRESSURE: 154 MMHG | HEIGHT: 71 IN

## 2019-05-06 DIAGNOSIS — C92.00 ACUTE MYELOID LEUKEMIA NOT HAVING ACHIEVED REMISSION (HCC): Primary | ICD-10-CM

## 2019-05-06 DIAGNOSIS — E61.2 MAGNESIUM DEFICIENCY: ICD-10-CM

## 2019-05-06 DIAGNOSIS — C95.00 ACUTE LEUKEMIA NOT HAVING ACHIEVED REMISSION (HCC): ICD-10-CM

## 2019-05-06 LAB
ABO/RH: NORMAL
ALBUMIN SERPL-MCNC: 3 G/DL (ref 3.5–5.2)
ALP BLD-CCNC: 109 U/L (ref 35–104)
ALT SERPL-CCNC: 17 U/L (ref 0–32)
ANION GAP SERPL CALCULATED.3IONS-SCNC: 11 MMOL/L (ref 7–16)
ANTIBODY SCREEN: NORMAL
AST SERPL-CCNC: 7 U/L (ref 0–31)
BASOPHILS ABSOLUTE: 0 E9/L (ref 0–0.2)
BASOPHILS RELATIVE PERCENT: 0 % (ref 0–2)
BILIRUB SERPL-MCNC: 0.3 MG/DL (ref 0–1.2)
BUN BLDV-MCNC: 24 MG/DL (ref 8–23)
CALCIUM SERPL-MCNC: 9.1 MG/DL (ref 8.6–10.2)
CHLORIDE BLD-SCNC: 98 MMOL/L (ref 98–107)
CO2: 25 MMOL/L (ref 22–29)
CREAT SERPL-MCNC: 0.9 MG/DL (ref 0.5–1)
EOSINOPHILS ABSOLUTE: 0 E9/L (ref 0.05–0.5)
EOSINOPHILS RELATIVE PERCENT: 0 % (ref 0–6)
GFR AFRICAN AMERICAN: >60
GFR NON-AFRICAN AMERICAN: >60 ML/MIN/1.73
GLUCOSE BLD-MCNC: 365 MG/DL (ref 74–99)
HCT VFR BLD CALC: 20.5 % (ref 34–48)
HEMOGLOBIN: 7.2 G/DL (ref 11.5–15.5)
LYMPHOCYTES ABSOLUTE: 0.38 E9/L (ref 1.5–4)
LYMPHOCYTES RELATIVE PERCENT: 96 % (ref 20–42)
MAGNESIUM: 1.3 MG/DL (ref 1.6–2.6)
MCH RBC QN AUTO: 34 PG (ref 26–35)
MCHC RBC AUTO-ENTMCNC: 35.1 % (ref 32–34.5)
MCV RBC AUTO: 96.7 FL (ref 80–99.9)
MONOCYTES ABSOLUTE: 0.02 E9/L (ref 0.1–0.95)
MONOCYTES RELATIVE PERCENT: 4 % (ref 2–12)
NEUTROPHILS ABSOLUTE: 0 E9/L (ref 1.8–7.3)
NEUTROPHILS RELATIVE PERCENT: 0 % (ref 43–80)
PDW BLD-RTO: 11.9 FL (ref 11.5–15)
PLATELET # BLD: 4 E9/L (ref 130–450)
PLATELET CONFIRMATION: NORMAL
PMV BLD AUTO: 13 FL (ref 7–12)
POTASSIUM SERPL-SCNC: 4 MMOL/L (ref 3.5–5)
RBC # BLD: 2.12 E12/L (ref 3.5–5.5)
SODIUM BLD-SCNC: 134 MMOL/L (ref 132–146)
TOTAL PROTEIN: 6.2 G/DL (ref 6.4–8.3)
URIC ACID, SERUM: 3.8 MG/DL (ref 2.4–5.7)
WBC # BLD: 0.4 E9/L (ref 4.5–11.5)

## 2019-05-06 PROCEDURE — 6360000002 HC RX W HCPCS: Performed by: NURSE PRACTITIONER

## 2019-05-06 PROCEDURE — 36430 TRANSFUSION BLD/BLD COMPNT: CPT

## 2019-05-06 PROCEDURE — P9035 PLATELET PHERES LEUKOREDUCED: HCPCS

## 2019-05-06 PROCEDURE — 80053 COMPREHEN METABOLIC PANEL: CPT

## 2019-05-06 PROCEDURE — 86922 COMPATIBILITY TEST ANTIGLOB: CPT

## 2019-05-06 PROCEDURE — 86901 BLOOD TYPING SEROLOGIC RH(D): CPT

## 2019-05-06 PROCEDURE — 86902 BLOOD TYPE ANTIGEN DONOR EA: CPT

## 2019-05-06 PROCEDURE — 86850 RBC ANTIBODY SCREEN: CPT

## 2019-05-06 PROCEDURE — 2580000003 HC RX 258: Performed by: NURSE PRACTITIONER

## 2019-05-06 PROCEDURE — 83735 ASSAY OF MAGNESIUM: CPT

## 2019-05-06 PROCEDURE — 86900 BLOOD TYPING SEROLOGIC ABO: CPT

## 2019-05-06 PROCEDURE — 84550 ASSAY OF BLOOD/URIC ACID: CPT

## 2019-05-06 PROCEDURE — 99214 OFFICE O/P EST MOD 30 MIN: CPT | Performed by: INTERNAL MEDICINE

## 2019-05-06 PROCEDURE — 85025 COMPLETE CBC W/AUTO DIFF WBC: CPT

## 2019-05-06 RX ORDER — EPINEPHRINE 1 MG/ML
0.3 INJECTION, SOLUTION, CONCENTRATE INTRAVENOUS PRN
Status: CANCELLED | OUTPATIENT
Start: 2019-05-06

## 2019-05-06 RX ORDER — 0.9 % SODIUM CHLORIDE 0.9 %
10 VIAL (ML) INJECTION ONCE
Status: CANCELLED | OUTPATIENT
Start: 2019-05-06

## 2019-05-06 RX ORDER — SODIUM CHLORIDE 0.9 % (FLUSH) 0.9 %
20 SYRINGE (ML) INJECTION PRN
Status: CANCELLED | OUTPATIENT
Start: 2019-05-06

## 2019-05-06 RX ORDER — MAGNESIUM SULFATE IN WATER 40 MG/ML
2 INJECTION, SOLUTION INTRAVENOUS ONCE
Status: CANCELLED
Start: 2019-05-06

## 2019-05-06 RX ORDER — SODIUM CHLORIDE 9 MG/ML
INJECTION, SOLUTION INTRAVENOUS CONTINUOUS
Status: CANCELLED | OUTPATIENT
Start: 2019-05-06

## 2019-05-06 RX ORDER — DIPHENHYDRAMINE HYDROCHLORIDE 50 MG/ML
50 INJECTION INTRAMUSCULAR; INTRAVENOUS ONCE
Status: CANCELLED | OUTPATIENT
Start: 2019-05-06

## 2019-05-06 RX ORDER — HEPARIN SODIUM (PORCINE) LOCK FLUSH IV SOLN 100 UNIT/ML 100 UNIT/ML
500 SOLUTION INTRAVENOUS PRN
Status: DISCONTINUED | OUTPATIENT
Start: 2019-05-06 | End: 2019-05-07 | Stop reason: HOSPADM

## 2019-05-06 RX ORDER — FUROSEMIDE 10 MG/ML
20 INJECTION INTRAMUSCULAR; INTRAVENOUS ONCE
Status: CANCELLED | OUTPATIENT
Start: 2019-05-06

## 2019-05-06 RX ORDER — SODIUM CHLORIDE 9 MG/ML
INJECTION, SOLUTION INTRAVENOUS CONTINUOUS
Status: DISCONTINUED | OUTPATIENT
Start: 2019-05-06 | End: 2019-05-07 | Stop reason: HOSPADM

## 2019-05-06 RX ORDER — METHYLPREDNISOLONE SODIUM SUCCINATE 125 MG/2ML
125 INJECTION, POWDER, LYOPHILIZED, FOR SOLUTION INTRAMUSCULAR; INTRAVENOUS ONCE
Status: CANCELLED | OUTPATIENT
Start: 2019-05-06

## 2019-05-06 RX ORDER — 0.9 % SODIUM CHLORIDE 0.9 %
250 INTRAVENOUS SOLUTION INTRAVENOUS ONCE
Status: CANCELLED | OUTPATIENT
Start: 2019-05-06

## 2019-05-06 RX ORDER — SODIUM CHLORIDE 0.9 % (FLUSH) 0.9 %
10 SYRINGE (ML) INJECTION PRN
Status: CANCELLED | OUTPATIENT
Start: 2019-05-06

## 2019-05-06 RX ORDER — SODIUM CHLORIDE 0.9 % (FLUSH) 0.9 %
10 SYRINGE (ML) INJECTION PRN
Status: DISCONTINUED | OUTPATIENT
Start: 2019-05-06 | End: 2019-05-07 | Stop reason: HOSPADM

## 2019-05-06 RX ORDER — HEPARIN SODIUM (PORCINE) LOCK FLUSH IV SOLN 100 UNIT/ML 100 UNIT/ML
500 SOLUTION INTRAVENOUS PRN
Status: CANCELLED | OUTPATIENT
Start: 2019-05-06

## 2019-05-06 RX ORDER — MAGNESIUM SULFATE IN WATER 40 MG/ML
4 INJECTION, SOLUTION INTRAVENOUS ONCE
Status: CANCELLED
Start: 2019-05-06

## 2019-05-06 RX ORDER — SODIUM CHLORIDE 9 MG/ML
INJECTION, SOLUTION INTRAVENOUS CONTINUOUS
Status: CANCELLED
Start: 2019-05-06

## 2019-05-06 RX ADMIN — SODIUM CHLORIDE: 9 INJECTION, SOLUTION INTRAVENOUS at 13:56

## 2019-05-06 RX ADMIN — Medication 10 ML: at 16:14

## 2019-05-06 RX ADMIN — Medication 500 UNITS: at 16:14

## 2019-05-06 NOTE — PROGRESS NOTES
[address] [facility] [address]  Dept: [phone]  Loc: [phone]  Attending progress Note       Reason for Visit:   AML.    Referring Physician: Dr. Amelie Ruiz (C/Wali Anaya).     PCP:  Marbin Currie MD     History of Present Illness:      The patient is a pleasant 70-year-old lady with a past medical history significant for hypertension, type II diabetes mellitus, hyperlipidemia, coronary artery disease, status post stents placement, morbid obesity, who had presented to [facility] for his near syncopal episode, weakness and profound fatigue, she was found to have a white count of 31.3, hemoglobin was 5.8, platelet count 17,718, she was transferred to [facility] with concern for acute leukemia, she had a bone marrow biopsy done on 11/23/2018, 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 NGS panel- IDH1, [id number], CBL slice site. Due to the patient's D: Addition to this intake, requiring significant assistance with mobility without ECOG 2-3, the patient was treated with azacitidine 75 mg/m² ×7 day course between 1127 and 12/3/2018, no tumor lysis was observed, she was on allopurinol for TLS prophylaxis, and acyclovir the year and the voriconazole for infection prophylaxis while inpatient, she was discharged on acyclovir, fluconazole and ciprofloxacin, which she has been taking. The patient is feeling tired, no chest pain or shortness of breath, no bleeding. She has lower leg edema, she had venous ultrasounds done they were negative for DVT, she also has history of lower extremities ulcers, she was referred to the wound clinic at [facility].      The patient saw Dr. Gen Del Rosario at [facility] on 12/31/2018, and had a BM bx done on 1/3.   She saw Dr. Mauro Lopez on 1/10, the blasts in the bone marrow had decreased to 4%, she will be seen again at [facility] on Thursday, plan for admission on Monday, 1/21/2019 for induction chemotherapy with 7+3.     She was admitted from 1/21/19-02/12/2019 for standard induction with idarubicin and cytarabine on 7+3 schedule.      Her chemotherapy course overall went well,was complicated by hyperglycemia. She was admitted 02/28/2019 for influenza and pneumonia.      She had a bone marrow biopsy on 03/11/2019 confirms complete remission by histology,flow and FISH studies. She received consolidation cycle #1 given 04/22 -04/27 with modified high dose cytarabine.     She saw Dr Merlin Kingfisher on 04/29/2019. She had her neulasta inj on 4/29. She states she feels weak. No bleeding or fever.     Review of Systems;  CONSTITUTIONAL: No fever, chills. Fair appetite, she is weak. ENMT: Eyes: No diplopia; Nose: No epistaxis. Mouth: No sore throat. RESPIRATORY: No hemoptysis, shortness of breath, cough. CARDIOVASCULAR: No chest pain, palpitations. GASTROINTESTINAL: No nausea/vomiting, abdominal pain,no diarrhea, no constipation. GENITOURINARY: No dysuria, urinary frequency, hematuria. NEURO: No syncope, presyncope, headache.   Remainder:  ROS NEGATIVE     Past Medical History:  Past Medical History             Diagnosis Date    CAD (coronary artery disease)      [facility]      GERD (gastroesophageal reflux disease)      Hyperlipidemia      Hypertension      Obesity      Osteoarthritis      Type 2 diabetes mellitus without complication (HCC)               Patient Active Problem List   Diagnosis    Acute leukemia not having achieved remission ([address]    Generalized weakness    Sepsis ([facility] [address] [facility]         Past Surgical History:  Past Surgical History             Procedure Laterality Date    CARDIAC CATHETERIZATION        CHOLECYSTECTOMY        DILATION AND CURETTAGE OF UTERUS        FOOT SURGERY Left              Family History:  Family History         Family History   Problem Relation Age of Onset    Coronary Art Dis Mother      Stroke Mother      Diabetes Mother      Coronary Art Dis Father      Diabetes Father      Diabetes Sister      Cancer Paternal Aunt           Bone            Medications:  Reviewed and reconciled.     Social History:  Social History               Socioeconomic History    Marital status:        Spouse name: Not on file    Number of children: Not on file    Years of education: Not on file    Highest education level: Not on file   Occupational History    Not on file   Social Needs    Financial resource strain: Not on file    Food insecurity:       Worry: Not on file       Inability: Not on file    Transportation needs:       Medical: Not on file       Non-medical: Not on file   Tobacco Use    Smoking status: Never Smoker    Smokeless tobacco: Never Used   Substance and Sexual Activity    Alcohol use: No    Drug use: No    Sexual activity: Not Currently       Partners: Male   Lifestyle    Physical activity:       Days per week: Not on file       Minutes per session: Not on file    Stress: Not on file   Relationships    Social connections:       Talks on phone: Not on file       Gets together: Not on file       Attends [employer] service: Not on file       Active member of club or organization: Not on file       Attends meetings of clubs or organizations: Not on file       Relationship status: Not on file    Intimate partner violence:       Fear of current or ex partner: Not on file       Emotionally abused: Not on file       Physically abused: Not on file       Forced sexual activity: Not on file   Other Topics Concern    Not on file   Social History Narrative    Not on file            Allergies:  No Known Allergies     Physical Exam:  BP (!) 147/88 (Site: Left Lower Arm, Position: Sitting, Cuff Size: Small Adult)   Pulse 98   Temp 98.9 °F (37.2 °C) (Oral)   Resp 20   Ht 5' 11\" (1.803 m)   BMI 43.52 kg/m²   GENERAL: Alert, oriented x 3, not in acute distress. She is in the wheelchair. HEENT: PERRLA; EOMI. Oropharynx clear.  Positive for pallor. NECK: Supple. No palpable cervical or supraclavicular lymphadenopathy. LUNGS: Good air entry bilaterally. No wheezing, crackles or rhonchi. CARDIOVASCULAR: Regular rate. No murmurs, rubs or gallops. ABDOMEN: Soft. Non-tender, non-distended. Chest petechia or rash on her abdomen. Positive bowel sounds. EXTREMITIES: Bilateral lower leg edema, left greater than right, venous stasis changes. NEUROLOGIC: No focal deficits.      ECOG PS 2-3     Impression/Plan:      The patient is a pleasant 59-year-old lady with a past medical history significant for hypertension, type II diabetes mellitus, hyperlipidemia, coronary artery disease, status post stents placement, morbid obesity, who had presented to [facility] for his near syncopal episode, weakness and profound fatigue, she was found to have a white count of 31.3, hemoglobin was 5.8, platelet count 48,551, she was transferred to [facility] with concern for acute leukemia, she had a bone marrow biopsy done on 11/23/2018, 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 NGS panel- IDH1, [id number], CBL slice site. Due to the patient's D: Addition to this intake, requiring significant assistance with mobility without ECOG 2-3, the patient was treated with azacitidine 75 mg/m² ×7 day course between 1127 and 12/3/2018, no tumor lysis was observed, she was on allopurinol for TLS prophylaxis.        She was admitted from 1/21/19-02/12/2019 for standard induction with idarubicin and cytarabine on 7+3 schedule. Her chemotherapy course overall went well,was complicated by hyperglycemia. She was admitted 02/28/2019 for influenza and pneumonia. She had a bone marrow biopsy on 03/11/2019 confirms complete remission by histology,flow and FISH studies. She received consolidation cycle #1 given 04/22 -04/27 with modified high dose cytarabine.     She saw Dr Faiza Dang on 04/29/2019. She had her neulasta shot.     Her platelets count today is 4K, she has a petechial rash, will receive platelets transfusion, 2 doses. Hgb 7.2, will receive 1 unit of PRBCs. All blood products to be leukoreduced and irradiated. Hypomagnesemia, will receive IV magnesium sulfate.     For antimicrobial prophylaxis, continue acyclovir, fluconazole and will prescribe Levaquin (did not tolerate  Ciprofloxacin).  Instructions were given to seek immediate medical attention if she has any fever or bleeding.     Return 5/9.       [address], MD   HEMATOLOGY/MEDICAL [facility] [address]  [address] [address]  Dept: [phone]  Loc: [phone]

## 2019-05-06 NOTE — PROGRESS NOTES
Patient received 2 unit(s) of platelets. Transfusion was tolerated well and the patient was observed for 30 minutes prior to discharge.

## 2019-05-07 ENCOUNTER — HOSPITAL ENCOUNTER (OUTPATIENT)
Dept: INFUSION THERAPY | Age: 64
Discharge: HOME | End: 2019-05-07
Payer: MEDICARE

## 2019-05-07 VITALS
SYSTOLIC BLOOD PRESSURE: 136 MMHG | HEART RATE: 85 BPM | DIASTOLIC BLOOD PRESSURE: 69 MMHG | RESPIRATION RATE: 18 BRPM | TEMPERATURE: 97.9 F

## 2019-05-07 DIAGNOSIS — C95.00 ACUTE LEUKEMIA NOT HAVING ACHIEVED REMISSION (HCC): Primary | ICD-10-CM

## 2019-05-07 PROCEDURE — 36430 TRANSFUSION BLD/BLD COMPNT: CPT

## 2019-05-07 PROCEDURE — 6360000002 HC RX W HCPCS: Performed by: NURSE PRACTITIONER

## 2019-05-07 PROCEDURE — 2580000003 HC RX 258: Performed by: NURSE PRACTITIONER

## 2019-05-07 PROCEDURE — 96365 THER/PROPH/DIAG IV INF INIT: CPT

## 2019-05-07 PROCEDURE — 2580000003 HC RX 258: Performed by: INTERNAL MEDICINE

## 2019-05-07 PROCEDURE — P9040 RBC LEUKOREDUCED IRRADIATED: HCPCS

## 2019-05-07 PROCEDURE — 6360000002 HC RX W HCPCS: Performed by: INTERNAL MEDICINE

## 2019-05-07 PROCEDURE — P9016 RBC LEUKOCYTES REDUCED: HCPCS

## 2019-05-07 PROCEDURE — 96366 THER/PROPH/DIAG IV INF ADDON: CPT

## 2019-05-07 RX ORDER — SODIUM CHLORIDE 0.9 % (FLUSH) 0.9 %
10 SYRINGE (ML) INJECTION PRN
Status: CANCELLED | OUTPATIENT
Start: 2019-05-07

## 2019-05-07 RX ORDER — SODIUM CHLORIDE 0.9 % (FLUSH) 0.9 %
10 SYRINGE (ML) INJECTION PRN
Status: DISCONTINUED | OUTPATIENT
Start: 2019-05-07 | End: 2019-05-08 | Stop reason: HOSPADM

## 2019-05-07 RX ORDER — MAGNESIUM SULFATE IN WATER 40 MG/ML
4 INJECTION, SOLUTION INTRAVENOUS ONCE
Status: COMPLETED | OUTPATIENT
Start: 2019-05-07 | End: 2019-05-07

## 2019-05-07 RX ORDER — EPINEPHRINE 1 MG/ML
0.3 INJECTION, SOLUTION, CONCENTRATE INTRAVENOUS PRN
Status: CANCELLED | OUTPATIENT
Start: 2019-05-07

## 2019-05-07 RX ORDER — DIPHENHYDRAMINE HYDROCHLORIDE 50 MG/ML
50 INJECTION INTRAMUSCULAR; INTRAVENOUS ONCE
Status: CANCELLED | OUTPATIENT
Start: 2019-05-07

## 2019-05-07 RX ORDER — METHYLPREDNISOLONE SODIUM SUCCINATE 125 MG/2ML
125 INJECTION, POWDER, LYOPHILIZED, FOR SOLUTION INTRAMUSCULAR; INTRAVENOUS ONCE
Status: CANCELLED | OUTPATIENT
Start: 2019-05-07

## 2019-05-07 RX ORDER — HEPARIN SODIUM (PORCINE) LOCK FLUSH IV SOLN 100 UNIT/ML 100 UNIT/ML
500 SOLUTION INTRAVENOUS PRN
Status: DISCONTINUED | OUTPATIENT
Start: 2019-05-07 | End: 2019-05-08 | Stop reason: HOSPADM

## 2019-05-07 RX ORDER — HEPARIN SODIUM (PORCINE) LOCK FLUSH IV SOLN 100 UNIT/ML 100 UNIT/ML
500 SOLUTION INTRAVENOUS PRN
Status: CANCELLED | OUTPATIENT
Start: 2019-05-07

## 2019-05-07 RX ORDER — SODIUM CHLORIDE 0.9 % (FLUSH) 0.9 %
20 SYRINGE (ML) INJECTION PRN
Status: CANCELLED | OUTPATIENT
Start: 2019-05-07

## 2019-05-07 RX ORDER — 0.9 % SODIUM CHLORIDE 0.9 %
10 VIAL (ML) INJECTION ONCE
Status: CANCELLED | OUTPATIENT
Start: 2019-05-07

## 2019-05-07 RX ORDER — 0.9 % SODIUM CHLORIDE 0.9 %
250 INTRAVENOUS SOLUTION INTRAVENOUS ONCE
Status: CANCELLED | OUTPATIENT
Start: 2019-05-07

## 2019-05-07 RX ORDER — MAGNESIUM SULFATE IN WATER 40 MG/ML
4 INJECTION, SOLUTION INTRAVENOUS ONCE
Status: CANCELLED
Start: 2019-05-07

## 2019-05-07 RX ORDER — SODIUM CHLORIDE 9 MG/ML
INJECTION, SOLUTION INTRAVENOUS CONTINUOUS
Status: CANCELLED | OUTPATIENT
Start: 2019-05-07

## 2019-05-07 RX ORDER — SODIUM CHLORIDE 9 MG/ML
INJECTION, SOLUTION INTRAVENOUS CONTINUOUS
Status: CANCELLED
Start: 2019-05-07

## 2019-05-07 RX ORDER — SODIUM CHLORIDE 9 MG/ML
INJECTION, SOLUTION INTRAVENOUS CONTINUOUS
Status: DISCONTINUED | OUTPATIENT
Start: 2019-05-07 | End: 2019-05-08 | Stop reason: HOSPADM

## 2019-05-07 RX ORDER — 0.9 % SODIUM CHLORIDE 0.9 %
250 INTRAVENOUS SOLUTION INTRAVENOUS ONCE
Status: COMPLETED | OUTPATIENT
Start: 2019-05-07 | End: 2019-05-07

## 2019-05-07 RX ORDER — FUROSEMIDE 10 MG/ML
20 INJECTION INTRAMUSCULAR; INTRAVENOUS ONCE
Status: CANCELLED | OUTPATIENT
Start: 2019-05-07

## 2019-05-07 RX ORDER — MAGNESIUM SULFATE IN WATER 40 MG/ML
2 INJECTION, SOLUTION INTRAVENOUS ONCE
Status: CANCELLED
Start: 2019-05-07

## 2019-05-07 RX ADMIN — SODIUM CHLORIDE: 9 INJECTION, SOLUTION INTRAVENOUS at 09:22

## 2019-05-07 RX ADMIN — SODIUM CHLORIDE 250 ML: 9 INJECTION, SOLUTION INTRAVENOUS at 11:38

## 2019-05-07 RX ADMIN — MAGNESIUM SULFATE HEPTAHYDRATE 4 G: 40 INJECTION, SOLUTION INTRAVENOUS at 09:23

## 2019-05-07 RX ADMIN — Medication 10 ML: at 13:58

## 2019-05-07 RX ADMIN — Medication 500 UNITS: at 13:58

## 2019-05-09 ENCOUNTER — HOSPITAL ENCOUNTER (OUTPATIENT)
Dept: INFUSION THERAPY | Age: 64
Discharge: HOME | End: 2019-05-09
Payer: MEDICARE

## 2019-05-09 ENCOUNTER — OFFICE VISIT (OUTPATIENT)
Dept: ONCOLOGY | Age: 64
End: 2019-05-09
Payer: MEDICARE

## 2019-05-09 VITALS
HEART RATE: 87 BPM | RESPIRATION RATE: 20 BRPM | DIASTOLIC BLOOD PRESSURE: 96 MMHG | TEMPERATURE: 98.4 F | BODY MASS INDEX: 41.63 KG/M2 | HEIGHT: 71 IN | SYSTOLIC BLOOD PRESSURE: 175 MMHG

## 2019-05-09 DIAGNOSIS — C95.00 ACUTE LEUKEMIA NOT HAVING ACHIEVED REMISSION (HCC): ICD-10-CM

## 2019-05-09 DIAGNOSIS — C92.00 ACUTE MYELOID LEUKEMIA NOT HAVING ACHIEVED REMISSION (HCC): Primary | ICD-10-CM

## 2019-05-09 DIAGNOSIS — E83.42 HYPOMAGNESEMIA: Primary | ICD-10-CM

## 2019-05-09 LAB
ALBUMIN SERPL-MCNC: 2.9 G/DL (ref 3.5–5.2)
ALP BLD-CCNC: 85 U/L (ref 35–104)
ALT SERPL-CCNC: 16 U/L (ref 0–32)
ANION GAP SERPL CALCULATED.3IONS-SCNC: 9 MMOL/L (ref 7–16)
AST SERPL-CCNC: 8 U/L (ref 0–31)
BASOPHILS ABSOLUTE: 0 E9/L (ref 0–0.2)
BASOPHILS RELATIVE PERCENT: 0 % (ref 0–2)
BILIRUB SERPL-MCNC: 0.3 MG/DL (ref 0–1.2)
BUN BLDV-MCNC: 27 MG/DL (ref 8–23)
CALCIUM SERPL-MCNC: 9.3 MG/DL (ref 8.6–10.2)
CHLORIDE BLD-SCNC: 100 MMOL/L (ref 98–107)
CO2: 26 MMOL/L (ref 22–29)
CREAT SERPL-MCNC: 0.9 MG/DL (ref 0.5–1)
EOSINOPHILS ABSOLUTE: 0 E9/L (ref 0.05–0.5)
EOSINOPHILS RELATIVE PERCENT: 0 % (ref 0–6)
GFR AFRICAN AMERICAN: >60
GFR NON-AFRICAN AMERICAN: >60 ML/MIN/1.73
GLUCOSE BLD-MCNC: 288 MG/DL (ref 74–99)
HCT VFR BLD CALC: 22 % (ref 34–48)
HEMOGLOBIN: 7.6 G/DL (ref 11.5–15.5)
LYMPHOCYTES ABSOLUTE: 0.5 E9/L (ref 1.5–4)
LYMPHOCYTES RELATIVE PERCENT: 100 % (ref 20–42)
MAGNESIUM: 1.5 MG/DL (ref 1.6–2.6)
MCH RBC QN AUTO: 32.6 PG (ref 26–35)
MCHC RBC AUTO-ENTMCNC: 34.5 % (ref 32–34.5)
MCV RBC AUTO: 94.4 FL (ref 80–99.9)
MONOCYTES ABSOLUTE: 0 E9/L (ref 0.1–0.95)
MONOCYTES RELATIVE PERCENT: 0 % (ref 2–12)
NEUTROPHILS ABSOLUTE: 0 E9/L (ref 1.8–7.3)
NEUTROPHILS RELATIVE PERCENT: 0 % (ref 43–80)
PDW BLD-RTO: 13.2 FL (ref 11.5–15)
PLATELET # BLD: 9 E9/L (ref 130–450)
PLATELET CONFIRMATION: NORMAL
PMV BLD AUTO: 11 FL (ref 7–12)
POTASSIUM SERPL-SCNC: 4.5 MMOL/L (ref 3.5–5)
RBC # BLD: 2.33 E12/L (ref 3.5–5.5)
SODIUM BLD-SCNC: 135 MMOL/L (ref 132–146)
TOTAL PROTEIN: 6.1 G/DL (ref 6.4–8.3)
URIC ACID, SERUM: 3.9 MG/DL (ref 2.4–5.7)
WBC # BLD: 0.5 E9/L (ref 4.5–11.5)

## 2019-05-09 PROCEDURE — 99214 OFFICE O/P EST MOD 30 MIN: CPT

## 2019-05-09 PROCEDURE — 80053 COMPREHEN METABOLIC PANEL: CPT

## 2019-05-09 PROCEDURE — 83735 ASSAY OF MAGNESIUM: CPT

## 2019-05-09 PROCEDURE — 6360000002 HC RX W HCPCS: Performed by: NURSE PRACTITIONER

## 2019-05-09 PROCEDURE — 99212 OFFICE O/P EST SF 10 MIN: CPT | Performed by: INTERNAL MEDICINE

## 2019-05-09 PROCEDURE — 2580000003 HC RX 258: Performed by: NURSE PRACTITIONER

## 2019-05-09 PROCEDURE — 99214 OFFICE O/P EST MOD 30 MIN: CPT | Performed by: INTERNAL MEDICINE

## 2019-05-09 PROCEDURE — 85025 COMPLETE CBC W/AUTO DIFF WBC: CPT

## 2019-05-09 PROCEDURE — 84550 ASSAY OF BLOOD/URIC ACID: CPT

## 2019-05-09 RX ORDER — SODIUM CHLORIDE 9 MG/ML
INJECTION, SOLUTION INTRAVENOUS CONTINUOUS
Status: CANCELLED | OUTPATIENT
Start: 2019-05-09

## 2019-05-09 RX ORDER — DIPHENHYDRAMINE HYDROCHLORIDE 50 MG/ML
50 INJECTION INTRAMUSCULAR; INTRAVENOUS ONCE
Status: CANCELLED | OUTPATIENT
Start: 2019-05-09

## 2019-05-09 RX ORDER — SODIUM CHLORIDE 0.9 % (FLUSH) 0.9 %
10 SYRINGE (ML) INJECTION PRN
Status: CANCELLED | OUTPATIENT
Start: 2019-05-09

## 2019-05-09 RX ORDER — MAGNESIUM SULFATE IN WATER 40 MG/ML
2 INJECTION, SOLUTION INTRAVENOUS ONCE
Status: CANCELLED
Start: 2019-05-09

## 2019-05-09 RX ORDER — SODIUM CHLORIDE 0.9 % (FLUSH) 0.9 %
20 SYRINGE (ML) INJECTION PRN
Status: CANCELLED | OUTPATIENT
Start: 2019-05-09

## 2019-05-09 RX ORDER — HEPARIN SODIUM (PORCINE) LOCK FLUSH IV SOLN 100 UNIT/ML 100 UNIT/ML
500 SOLUTION INTRAVENOUS PRN
Status: CANCELLED | OUTPATIENT
Start: 2019-05-09

## 2019-05-09 RX ORDER — 0.9 % SODIUM CHLORIDE 0.9 %
10 VIAL (ML) INJECTION ONCE
Status: CANCELLED | OUTPATIENT
Start: 2019-05-09

## 2019-05-09 RX ORDER — METHYLPREDNISOLONE SODIUM SUCCINATE 125 MG/2ML
125 INJECTION, POWDER, LYOPHILIZED, FOR SOLUTION INTRAMUSCULAR; INTRAVENOUS ONCE
Status: CANCELLED | OUTPATIENT
Start: 2019-05-09

## 2019-05-09 RX ORDER — SODIUM CHLORIDE 0.9 % (FLUSH) 0.9 %
10 SYRINGE (ML) INJECTION PRN
Status: DISCONTINUED | OUTPATIENT
Start: 2019-05-09 | End: 2019-05-10 | Stop reason: HOSPADM

## 2019-05-09 RX ORDER — EPINEPHRINE 1 MG/ML
0.3 INJECTION, SOLUTION, CONCENTRATE INTRAVENOUS PRN
Status: CANCELLED | OUTPATIENT
Start: 2019-05-09

## 2019-05-09 RX ORDER — HEPARIN SODIUM (PORCINE) LOCK FLUSH IV SOLN 100 UNIT/ML 100 UNIT/ML
500 SOLUTION INTRAVENOUS PRN
Status: DISCONTINUED | OUTPATIENT
Start: 2019-05-09 | End: 2019-05-10 | Stop reason: HOSPADM

## 2019-05-09 RX ADMIN — Medication 10 ML: at 11:49

## 2019-05-09 RX ADMIN — Medication 500 UNITS: at 11:49

## 2019-05-09 NOTE — PROGRESS NOTES
[address] [facility] [address] [address]  Dept: [phone]  Loc: [phone]  Attending progress Note       Reason for Visit:   AML.    Referring Physician: Dr. Angelica Mcmillan (C/Wali Anaya).     PCP:  Stan Mcintyre MD     History of Present Illness:      The patient is a pleasant 57-year-old lady with a past medical history significant for hypertension, type II diabetes mellitus, hyperlipidemia, coronary artery disease, status post stents placement, morbid obesity, who had presented to [facility] for his near syncopal episode, weakness and profound fatigue, she was found to have a white count of 31.3, hemoglobin was 5.8, platelet count 10,507, she was transferred to [facility] with concern for acute leukemia, she had a bone marrow biopsy done on 11/23/2018, 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 NGS panel- IDH1, NSCS91, CBL slice site. Due to the patient's D: Addition to this intake, requiring significant assistance with mobility without ECOG 2-3, the patient was treated with azacitidine 75 mg/m² ×7 day course between 1127 and 12/3/2018, no tumor lysis was observed, she was on allopurinol for TLS prophylaxis, and acyclovir the year and the voriconazole for infection prophylaxis while inpatient, she was discharged on acyclovir, fluconazole and ciprofloxacin, which she has been taking. The patient is feeling tired, no chest pain or shortness of breath, no bleeding. She has lower leg edema, she had venous ultrasounds done they were negative for DVT, she also has history of lower extremities ulcers, she was referred to the wound clinic at [facility].      The patient saw Dr. Zo Goodman at [facility] on 12/31/2018, and had a BM bx done on 1/3.   She saw Dr. Earlene Martin on 1/10, the blasts in the bone marrow had decreased to 4%, she will be seen again at [facility] on Thursday, plan for admission on Monday, 1/21/2019 for induction chemotherapy with 7+3.     She was admitted from 1/21/19-02/12/2019 for standard induction with idarubicin and cytarabine on 7+3 schedule.      Her chemotherapy course overall went well,was complicated by hyperglycemia. She was admitted 02/28/2019 for influenza and pneumonia.      She had a bone marrow biopsy on 03/11/2019 confirms complete remission by histology,flow and FISH studies. She received consolidation cycle #1 given 04/22 -04/27 with modified high dose cytarabine.     She saw Dr Naida Stewart on 04/29/2019. She had her neulasta inj on 4/29. She is feeling better. No bleeding or fever.     Review of Systems;  CONSTITUTIONAL: No fever, chills. Fair appetite, improved energy level. ENMT: Eyes: No diplopia; Nose: No epistaxis. Mouth: No sore throat. RESPIRATORY: No hemoptysis, shortness of breath, cough. CARDIOVASCULAR: No chest pain, palpitations. GASTROINTESTINAL: No nausea/vomiting, abdominal pain,no diarrhea, no constipation. GENITOURINARY: No dysuria, urinary frequency, hematuria. NEURO: No syncope, presyncope, headache.   Remainder:  ROS NEGATIVE     Past Medical History:  Past Medical History             Diagnosis Date    CAD (coronary artery disease)      Cancer ([address]      GERD (gastroesophageal reflux disease)      Hyperlipidemia      Hypertension      Obesity      Osteoarthritis      Type 2 diabetes mellitus without complication (HCC)               Patient Active Problem List   Diagnosis    Acute leukemia not having achieved remission ([address]    Generalized weakness    Sepsis ([facility]         Past Surgical History:  Past Surgical History             Procedure Laterality Date    CARDIAC CATHETERIZATION        CHOLECYSTECTOMY        DILATION AND CURETTAGE OF UTERUS        FOOT SURGERY Left              Family History:  Family History         Family History   Problem Relation Age of Onset    Coronary Art Dis Mother      Stroke Mother      Diabetes Mother      Coronary Art Dis Father      Diabetes Father      Diabetes Sister      Cancer Paternal Aunt           Bone            Medications:  Reviewed and reconciled.     Social History:  Social History               Socioeconomic History    Marital status:        Spouse name: Not on file    Number of children: Not on file    Years of education: Not on file    Highest education level: Not on file   Occupational History    Not on file   Social Needs    Financial resource strain: Not on file    Food insecurity:       Worry: Not on file       Inability: Not on file    Transportation needs:       Medical: Not on file       Non-medical: Not on file   Tobacco Use    Smoking status: Never Smoker    Smokeless tobacco: Never Used   Substance and Sexual Activity    Alcohol use: No    Drug use: No    Sexual activity: Not Currently       Partners: Male   Lifestyle    Physical activity:       Days per week: Not on file       Minutes per session: Not on file    Stress: Not on file   Relationships    Social connections:       Talks on phone: Not on file       Gets together: Not on file       Attends Anabaptism service: Not on file       Active member of club or organization: Not on file       Attends meetings of clubs or organizations: Not on file       Relationship status: Not on file    Intimate partner violence:       Fear of current or ex partner: Not on file       Emotionally abused: Not on file       Physically abused: Not on file       Forced sexual activity: Not on file   Other Topics Concern    Not on file   Social History Narrative    Not on file            Allergies:  No Known Allergies     Physical Exam:  BP (!) 147/88 (Site: Left Lower Arm, Position: Sitting, Cuff Size: Small Adult)   Pulse 98   Temp 98.9 °F (37.2 °C) (Oral)   Resp 20   Ht 5' 11\" (1.803 m)   BMI 43.52 kg/m²   GENERAL: Alert, oriented x 3, not in acute distress. She is in the wheelchair. HEENT: PERRLA; EOMI. Oropharynx clear.  Positive for pallor. NECK: Supple. No palpable cervical or supraclavicular lymphadenopathy. LUNGS: Good air entry bilaterally. No wheezing, crackles or rhonchi. CARDIOVASCULAR: Regular rate. No murmurs, rubs or gallops. ABDOMEN: Soft. Non-tender, non-distended. Chest petechia or rash on her abdomen. Positive bowel sounds. EXTREMITIES: Bilateral lower leg edema, left greater than right, venous stasis changes. NEUROLOGIC: No focal deficits.      ECOG PS 2-3     Impression/Plan:      The patient is a pleasant 77-year-old lady with a past medical history significant for hypertension, type II diabetes mellitus, hyperlipidemia, coronary artery disease, status post stents placement, morbid obesity, who had presented to [facility] for his near syncopal episode, weakness and profound fatigue, she was found to have a white count of 31.3, hemoglobin was 5.8, platelet count 21,886, she was transferred to [facility] with concern for acute leukemia, she had a bone marrow biopsy done on 11/23/2018, 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 NGS panel- IDH1, OQZB93, CBL slice site. Due to the patient's D: Addition to this intake, requiring significant assistance with mobility without ECOG 2-3, the patient was treated with azacitidine 75 mg/m² ×7 day course between 1127 and 12/3/2018, no tumor lysis was observed, she was on allopurinol for TLS prophylaxis.        She was admitted from 1/21/19-02/12/2019 for standard induction with idarubicin and cytarabine on 7+3 schedule. Her chemotherapy course overall went well,was complicated by hyperglycemia. She was admitted 02/28/2019 for influenza and pneumonia. She had a bone marrow biopsy on 03/11/2019 confirms complete remission by histology,flow and FISH studies. She received consolidation cycle #1 given 04/22 -04/27 with modified high dose cytarabine.     She saw Dr Jaylin Jacobs on 04/29/2019. She had her neulasta shot.     Her platelets count today is 9K, Hgb 7.6, will receive platelet transfusion, the patient does not want to have that transfusion today, would like to return tomorrow. All blood products to be leukoreduced and irradiated. Hypomagnesemia, will receive 2 g of IV magnesium sulfate.     For antimicrobial prophylaxis, continue acyclovir, fluconazole and Levaquin (did not tolerate  Ciprofloxacin).  Instructions were given to seek immediate medical attention if she has any fever or bleeding.     Return 5/13/2019.       Antonia Stewart MD   [facility]  [address] [address]  Dept: [phone]  Loc: [phone]

## 2019-05-10 ENCOUNTER — APPOINTMENT (OUTPATIENT)
Dept: GENERAL RADIOLOGY | Age: 64
End: 2019-05-10
Payer: MEDICARE

## 2019-05-10 ENCOUNTER — HOSPITAL ENCOUNTER (EMERGENCY)
Age: 64
Discharge: ANOTHER HOSPITAL | End: 2019-05-11
Attending: EMERGENCY MEDICINE
Payer: MEDICARE

## 2019-05-10 ENCOUNTER — APPOINTMENT (OUTPATIENT)
Dept: CT IMAGING | Age: 64
End: 2019-05-10
Payer: MEDICARE

## 2019-05-10 VITALS
HEIGHT: 71 IN | SYSTOLIC BLOOD PRESSURE: 162 MMHG | OXYGEN SATURATION: 94 % | TEMPERATURE: 102.2 F | WEIGHT: 293 LBS | RESPIRATION RATE: 17 BRPM | DIASTOLIC BLOOD PRESSURE: 79 MMHG | BODY MASS INDEX: 41.02 KG/M2 | HEART RATE: 113 BPM

## 2019-05-10 DIAGNOSIS — D70.8 OTHER NEUTROPENIA (HCC): ICD-10-CM

## 2019-05-10 DIAGNOSIS — R65.10 SIRS (SYSTEMIC INFLAMMATORY RESPONSE SYNDROME) (HCC): ICD-10-CM

## 2019-05-10 DIAGNOSIS — D69.6 THROMBOCYTOPENIA (HCC): ICD-10-CM

## 2019-05-10 DIAGNOSIS — H81.10 BENIGN PAROXYSMAL POSITIONAL VERTIGO, UNSPECIFIED LATERALITY: Primary | ICD-10-CM

## 2019-05-10 LAB
ABO/RH: NORMAL
ALBUMIN SERPL-MCNC: 3 G/DL (ref 3.5–5.2)
ALP BLD-CCNC: 90 U/L (ref 35–104)
ALT SERPL-CCNC: 18 U/L (ref 0–32)
ANION GAP SERPL CALCULATED.3IONS-SCNC: 11 MMOL/L (ref 7–16)
ANTIBODY SCREEN: NORMAL
APTT: 28.7 SEC (ref 24.5–35.1)
AST SERPL-CCNC: 11 U/L (ref 0–31)
BACTERIA: ABNORMAL /HPF
BASOPHILS ABSOLUTE: 0 E9/L (ref 0–0.2)
BASOPHILS RELATIVE PERCENT: 0 % (ref 0–2)
BILIRUB SERPL-MCNC: 0.3 MG/DL (ref 0–1.2)
BILIRUBIN URINE: NEGATIVE
BLOOD BANK DISPENSE STATUS: NORMAL
BLOOD BANK PRODUCT CODE: NORMAL
BLOOD, URINE: ABNORMAL
BPU ID: NORMAL
BUN BLDV-MCNC: 23 MG/DL (ref 8–23)
CALCIUM SERPL-MCNC: 9.6 MG/DL (ref 8.6–10.2)
CHLORIDE BLD-SCNC: 97 MMOL/L (ref 98–107)
CLARITY: CLEAR
CO2: 28 MMOL/L (ref 22–29)
COLOR: YELLOW
CREAT SERPL-MCNC: 0.9 MG/DL (ref 0.5–1)
DESCRIPTION BLOOD BANK: NORMAL
EKG ATRIAL RATE: 124 BPM
EKG P AXIS: 68 DEGREES
EKG P-R INTERVAL: 152 MS
EKG Q-T INTERVAL: 280 MS
EKG QRS DURATION: 62 MS
EKG QTC CALCULATION (BAZETT): 402 MS
EKG R AXIS: 9 DEGREES
EKG T AXIS: 66 DEGREES
EKG VENTRICULAR RATE: 124 BPM
EOSINOPHILS ABSOLUTE: 0 E9/L (ref 0.05–0.5)
EOSINOPHILS RELATIVE PERCENT: 0 % (ref 0–6)
EPITHELIAL CELLS, UA: ABNORMAL /HPF
GFR AFRICAN AMERICAN: >60
GFR NON-AFRICAN AMERICAN: >60 ML/MIN/1.73
GLUCOSE BLD-MCNC: 394 MG/DL (ref 74–99)
GLUCOSE URINE: >=1000 MG/DL
HCT VFR BLD CALC: 22.3 % (ref 34–48)
HEMOGLOBIN: 8 G/DL (ref 11.5–15.5)
INR BLD: 1.2
KETONES, URINE: NEGATIVE MG/DL
LACTIC ACID: 2.7 MMOL/L (ref 0.5–2.2)
LEUKOCYTE ESTERASE, URINE: NEGATIVE
LYMPHOCYTES ABSOLUTE: 0.1 E9/L (ref 1.5–4)
LYMPHOCYTES RELATIVE PERCENT: 97 % (ref 20–42)
MCH RBC QN AUTO: 33.1 PG (ref 26–35)
MCHC RBC AUTO-ENTMCNC: 35.9 % (ref 32–34.5)
MCV RBC AUTO: 92.1 FL (ref 80–99.9)
MONOCYTES ABSOLUTE: 0 E9/L (ref 0.1–0.95)
MONOCYTES RELATIVE PERCENT: 0 % (ref 2–12)
NEUTROPHILS ABSOLUTE: 0 E9/L (ref 1.8–7.3)
NEUTROPHILS RELATIVE PERCENT: 3 % (ref 43–80)
NITRITE, URINE: NEGATIVE
PDW BLD-RTO: 12.6 FL (ref 11.5–15)
PH UA: 5 (ref 5–9)
PH VENOUS: 7.48 (ref 7.3–7.42)
PLATELET # BLD: 4 E9/L (ref 130–450)
PLATELET CONFIRMATION: NORMAL
PMV BLD AUTO: 13.1 FL (ref 7–12)
POTASSIUM SERPL-SCNC: 4.6 MMOL/L (ref 3.5–5)
PROTEIN UA: ABNORMAL MG/DL
PROTHROMBIN TIME: 13.5 SEC (ref 9.3–12.4)
RBC # BLD: 2.42 E12/L (ref 3.5–5.5)
RBC # BLD: NORMAL 10*6/UL
RBC UA: ABNORMAL /HPF (ref 0–2)
SODIUM BLD-SCNC: 136 MMOL/L (ref 132–146)
SPECIFIC GRAVITY UA: 1.02 (ref 1–1.03)
TOTAL PROTEIN: 6 G/DL (ref 6.4–8.3)
TROPONIN: <0.01 NG/ML (ref 0–0.03)
UROBILINOGEN, URINE: 0.2 E.U./DL
WBC # BLD: 0.1 E9/L (ref 4.5–11.5)
WBC UA: ABNORMAL /HPF (ref 0–5)

## 2019-05-10 PROCEDURE — 86901 BLOOD TYPING SEROLOGIC RH(D): CPT

## 2019-05-10 PROCEDURE — 86900 BLOOD TYPING SEROLOGIC ABO: CPT

## 2019-05-10 PROCEDURE — 87186 SC STD MICRODIL/AGAR DIL: CPT

## 2019-05-10 PROCEDURE — 70450 CT HEAD/BRAIN W/O DYE: CPT

## 2019-05-10 PROCEDURE — 82800 BLOOD PH: CPT

## 2019-05-10 PROCEDURE — 85610 PROTHROMBIN TIME: CPT

## 2019-05-10 PROCEDURE — 96366 THER/PROPH/DIAG IV INF ADDON: CPT

## 2019-05-10 PROCEDURE — 80053 COMPREHEN METABOLIC PANEL: CPT

## 2019-05-10 PROCEDURE — 93010 ELECTROCARDIOGRAM REPORT: CPT | Performed by: INTERNAL MEDICINE

## 2019-05-10 PROCEDURE — 99285 EMERGENCY DEPT VISIT HI MDM: CPT

## 2019-05-10 PROCEDURE — P9035 PLATELET PHERES LEUKOREDUCED: HCPCS

## 2019-05-10 PROCEDURE — 6360000002 HC RX W HCPCS: Performed by: EMERGENCY MEDICINE

## 2019-05-10 PROCEDURE — 83605 ASSAY OF LACTIC ACID: CPT

## 2019-05-10 PROCEDURE — 86850 RBC ANTIBODY SCREEN: CPT

## 2019-05-10 PROCEDURE — 36415 COLL VENOUS BLD VENIPUNCTURE: CPT

## 2019-05-10 PROCEDURE — 93005 ELECTROCARDIOGRAM TRACING: CPT | Performed by: EMERGENCY MEDICINE

## 2019-05-10 PROCEDURE — 96365 THER/PROPH/DIAG IV INF INIT: CPT

## 2019-05-10 PROCEDURE — 85025 COMPLETE CBC W/AUTO DIFF WBC: CPT

## 2019-05-10 PROCEDURE — 36430 TRANSFUSION BLD/BLD COMPNT: CPT

## 2019-05-10 PROCEDURE — 96375 TX/PRO/DX INJ NEW DRUG ADDON: CPT

## 2019-05-10 PROCEDURE — 81001 URINALYSIS AUTO W/SCOPE: CPT

## 2019-05-10 PROCEDURE — 85730 THROMBOPLASTIN TIME PARTIAL: CPT

## 2019-05-10 PROCEDURE — 84484 ASSAY OF TROPONIN QUANT: CPT

## 2019-05-10 PROCEDURE — 87088 URINE BACTERIA CULTURE: CPT

## 2019-05-10 PROCEDURE — 2580000003 HC RX 258: Performed by: EMERGENCY MEDICINE

## 2019-05-10 PROCEDURE — 71045 X-RAY EXAM CHEST 1 VIEW: CPT

## 2019-05-10 PROCEDURE — 6370000000 HC RX 637 (ALT 250 FOR IP): Performed by: EMERGENCY MEDICINE

## 2019-05-10 PROCEDURE — 96367 TX/PROPH/DG ADDL SEQ IV INF: CPT

## 2019-05-10 PROCEDURE — 87040 BLOOD CULTURE FOR BACTERIA: CPT

## 2019-05-10 PROCEDURE — 87077 CULTURE AEROBIC IDENTIFY: CPT

## 2019-05-10 RX ORDER — ACETAMINOPHEN 325 MG/1
650 TABLET ORAL ONCE
Status: COMPLETED | OUTPATIENT
Start: 2019-05-10 | End: 2019-05-10

## 2019-05-10 RX ORDER — SODIUM CHLORIDE 9 MG/ML
INJECTION, SOLUTION INTRAVENOUS ONCE
Status: COMPLETED | OUTPATIENT
Start: 2019-05-10 | End: 2019-05-10

## 2019-05-10 RX ORDER — SODIUM CHLORIDE 9 MG/ML
INJECTION, SOLUTION INTRAVENOUS ONCE
Status: COMPLETED | OUTPATIENT
Start: 2019-05-10 | End: 2019-05-11

## 2019-05-10 RX ORDER — ONDANSETRON 2 MG/ML
4 INJECTION INTRAMUSCULAR; INTRAVENOUS ONCE
Status: COMPLETED | OUTPATIENT
Start: 2019-05-10 | End: 2019-05-10

## 2019-05-10 RX ORDER — SODIUM CHLORIDE 9 MG/ML
INJECTION, SOLUTION INTRAVENOUS CONTINUOUS
Status: DISCONTINUED | OUTPATIENT
Start: 2019-05-10 | End: 2019-05-11 | Stop reason: HOSPADM

## 2019-05-10 RX ORDER — 0.9 % SODIUM CHLORIDE 0.9 %
1000 INTRAVENOUS SOLUTION INTRAVENOUS ONCE
Status: DISCONTINUED | OUTPATIENT
Start: 2019-05-10 | End: 2019-05-10

## 2019-05-10 RX ORDER — MECLIZINE HCL 12.5 MG/1
25 TABLET ORAL ONCE
Status: COMPLETED | OUTPATIENT
Start: 2019-05-10 | End: 2019-05-10

## 2019-05-10 RX ORDER — ACETAMINOPHEN 500 MG
1000 TABLET ORAL ONCE
Status: COMPLETED | OUTPATIENT
Start: 2019-05-10 | End: 2019-05-10

## 2019-05-10 RX ORDER — OXYCODONE HYDROCHLORIDE AND ACETAMINOPHEN 5; 325 MG/1; MG/1
1 TABLET ORAL ONCE
Status: COMPLETED | OUTPATIENT
Start: 2019-05-10 | End: 2019-05-10

## 2019-05-10 RX ORDER — SODIUM CHLORIDE 0.9 % (FLUSH) 0.9 %
SYRINGE (ML) INJECTION
Status: DISCONTINUED
Start: 2019-05-10 | End: 2019-05-11 | Stop reason: HOSPADM

## 2019-05-10 RX ORDER — 0.9 % SODIUM CHLORIDE 0.9 %
250 INTRAVENOUS SOLUTION INTRAVENOUS ONCE
Status: COMPLETED | OUTPATIENT
Start: 2019-05-10 | End: 2019-05-11

## 2019-05-10 RX ADMIN — SODIUM CHLORIDE 250 ML: 9 INJECTION, SOLUTION INTRAVENOUS at 15:38

## 2019-05-10 RX ADMIN — ACETAMINOPHEN 1000 MG: 500 TABLET, FILM COATED ORAL at 14:40

## 2019-05-10 RX ADMIN — MECLIZINE 25 MG: 12.5 TABLET ORAL at 14:41

## 2019-05-10 RX ADMIN — PIPERACILLIN AND TAZOBACTAM 3.38 G: 3; .375 INJECTION, POWDER, FOR SOLUTION INTRAVENOUS at 16:20

## 2019-05-10 RX ADMIN — ACETAMINOPHEN 650 MG: 325 TABLET, FILM COATED ORAL at 19:20

## 2019-05-10 RX ADMIN — VANCOMYCIN HYDROCHLORIDE 1500 MG: 10 INJECTION, POWDER, LYOPHILIZED, FOR SOLUTION INTRAVENOUS at 18:04

## 2019-05-10 RX ADMIN — SODIUM CHLORIDE: 9 INJECTION, SOLUTION INTRAVENOUS at 19:20

## 2019-05-10 RX ADMIN — OXYCODONE HYDROCHLORIDE AND ACETAMINOPHEN 1 TABLET: 5; 325 TABLET ORAL at 19:02

## 2019-05-10 RX ADMIN — SODIUM CHLORIDE: 9 INJECTION, SOLUTION INTRAVENOUS at 14:28

## 2019-05-10 RX ADMIN — CEFEPIME HYDROCHLORIDE 2 G: 2 INJECTION, POWDER, FOR SOLUTION INTRAVENOUS at 15:39

## 2019-05-10 RX ADMIN — ONDANSETRON 4 MG: 2 INJECTION INTRAMUSCULAR; INTRAVENOUS at 14:30

## 2019-05-10 RX ADMIN — SODIUM CHLORIDE: 9 INJECTION, SOLUTION INTRAVENOUS at 16:45

## 2019-05-10 ASSESSMENT — PAIN SCALES - GENERAL
PAINLEVEL_OUTOF10: 2
PAINLEVEL_OUTOF10: 7
PAINLEVEL_OUTOF10: 0

## 2019-05-10 NOTE — ED NOTES
Called and spoke with Phillip at [facility] to check on status of a bed, he states someone was on the phone with them from the access center and they told them that they have a bed for the patient it just needs cleaned so once they get it ready and give the room number they will give us a call with patients bed assignment     Yossi Verma  05/10/19 1947

## 2019-05-10 NOTE — ED PROVIDER NOTES
HPI:  5/10/19, Time: 1:54 PM         Jorge Luis Naranjo is a 61 y.o. female presenting to the ED for vertigo, beginning days ago. The complaint has been intermittent, moderate in severity, and worsened by movement of her head. There's been mild nausea no vomiting no diarrhea no constipation. There's been no chest pain or shortness of breath and no focal neurologic signs. She describes her vertigo as the room spinning it is worse with movement of her head there is been no visual loss no hearing loss and no facial droop    ROS:   Pertinent positives and negatives are stated within HPI, all other systems reviewed and are negative.  --------------------------------------------- PAST HISTORY ---------------------------------------------  Past Medical History:  has a past medical history of CAD (coronary artery disease), [facility] GERD (gastroesophageal reflux disease), Hyperlipidemia, Hypertension, Obesity, Osteoarthritis, and Type 2 diabetes mellitus without complication ([facility] Past Surgical History:  has a past surgical history that includes Cholecystectomy; Dilation and curettage of uterus; Foot surgery (Left); and Cardiac catheterization. Social History:  reports that she has never smoked. She has never used smokeless tobacco. She reports that she does not drink alcohol or use drugs. Family History: family history includes Cancer in her paternal aunt; Coronary Art Dis in her father and mother; Diabetes in her father, mother, and sister; Stroke in her mother. The patients home medications have been reviewed. Allergies: Patient has no known allergies.     ---------------------------------------------------PHYSICAL EXAM--------------------------------------    Constitutional/General: Alert and oriented x3, well appearing, non toxic in NAD  Head: Normocephalic and atraumatic  Eyes: PERRL, EOMI, there is lateral nystagmus no papilledema  Mouth: Oropharynx clear, handling secretions, no NOTES AND VITALS REVIEWED ---------------------------   The nursing notes within the ED encounter and vital signs as below have been reviewed by myself. BP (!) 140/57   Pulse 115   Temp 102.9 °F (39.4 °C) (Core)   Resp 22   Ht 5' 11\" (1.803 m)   Wt (!) 306 lb (138.8 kg)   SpO2 95%   BMI 42.68 kg/m²   Oxygen Saturation Interpretation: Normal    The patients available past medical records and past encounters were reviewed. ------------------------------ ED COURSE/MEDICAL DECISION MAKING----------------------  Medications   0.9 % sodium chloride infusion (has no administration in time range)   0.9 % sodium chloride bolus (250 mLs Intravenous New Bag 5/10/19 1538)   piperacillin-tazobactam (ZOSYN) 3.375 g in dextrose 5 % 50 mL IVPB extended infusion (mini-bag) (3.375 g Intravenous New Bag 5/10/19 1620)   vancomycin (VANCOCIN) 1,500 mg in dextrose 5 % 300 mL IVPB (has no administration in time range)   ondansetron (ZOFRAN) injection 4 mg (4 mg Intravenous Given 5/10/19 1430)   meclizine (ANTIVERT) tablet 25 mg (25 mg Oral Given 5/10/19 1441)   acetaminophen (TYLENOL) tablet 1,000 mg (1,000 mg Oral Given 5/10/19 1440)   0.9 % sodium chloride infusion ( Intravenous New Bag 5/10/19 1428)   cefepime (MAXIPIME) 2 g IVPB extended (mini-bag) (0 g Intravenous Stopped 5/10/19 1612)             Medical Decision Making:    Patient has fever with tachycardia. I have ordered a 30 mL C per kilogram fluid bolus I have ordered Tylenol in addition we'll obtain lactic acid and blood cultures chest x-ray and urinalysis. Her EKG reveals sinus tachycardia  For her vertigo I have ordered Zofran and Antivert. In addition to a cat scan of her     I have ordered neutropenia precautions. She was started on cefepime and vancomycin. Her MediPort was accessed by the nursing staff to administer IV fluid bolus. CT scan reveals no issues with any type of brain lesion there is sinusitis. Chest x-ray is clear.  Her platelets were low and I ordered the transfusion of platelets. Re-Evaluations:             Re-evaluation. Patients symptoms show no change      Consultations:             Telephone consult was placed to Dr. Kyle valle to notify him the patient will be transferred to [facility] per the patient request    Critical Care: 40 minutes  Patient received critical care for 30 to 74 minutes, plus Normal minutes for procedures, due to sirs. I felt there was a high likelihood of decompensation, to point of disability or death, had the patient not received immediate treatment. This patient's ED course included: re-evaluation prior to disposition and a personal history and physicial eaxmination    This patient has remained hemodynamically stable and remained unchanged during their ED course. Counseling: The emergency provider has spoken with the patient and discussed todays results, in addition to providing specific details for the plan of care and counseling regarding the diagnosis and prognosis. Questions are answered at this time and they are agreeable with the plan.       --------------------------------- IMPRESSION AND DISPOSITION ---------------------------------    IMPRESSION  1. Benign paroxysmal positional vertigo, unspecified laterality    2. SIRS (systemic inflammatory response syndrome) (HCC)    3. Other neutropenia (HCC)    4. Thrombocytopenia ([address]        DISPOSITION  Disposition: Transfer to [facility]  Patient condition is fair        NOTE: This report was transcribed using voice recognition software.  Every effort was made to ensure accuracy; however, inadvertent computerized transcription errors may be present          Afua Sol MD  05/10/19 1987

## 2019-05-11 PROCEDURE — 6370000000 HC RX 637 (ALT 250 FOR IP): Performed by: EMERGENCY MEDICINE

## 2019-05-11 RX ORDER — IBUPROFEN 600 MG/1
600 TABLET ORAL ONCE
Status: COMPLETED | OUTPATIENT
Start: 2019-05-11 | End: 2019-05-11

## 2019-05-11 RX ADMIN — IBUPROFEN 600 MG: 600 TABLET ORAL at 00:09

## 2019-05-11 NOTE — ED NOTES
Flaca from [facility] called and stated they will be here at midnight to transport patient to [facility], RN notified     Jorge A Spears  05/10/19 6683

## 2019-05-11 NOTE — ED NOTES
Saintclair Pollock from [facility] called she gave room assignment on patient patient will be going to [facility] Room 3015 Bed A, number for report is [phone], they will call back with an ETA for transport      Lauren Sykes  05/10/19 2057       Lauren Sykes  05/10/19 2059

## 2019-05-11 NOTE — ED NOTES
Nurse to nurse called to Chanda Aldridge at [facility], [address] [facility] bed a, [phone]     Yesenia Akers RN  05/10/19 0204

## 2019-05-12 LAB — URINE CULTURE, ROUTINE: NORMAL

## 2019-05-13 LAB — ORGANISM: ABNORMAL

## 2019-05-24 DIAGNOSIS — C92.00 ACUTE MYELOID LEUKEMIA NOT HAVING ACHIEVED REMISSION (HCC): Primary | ICD-10-CM

## 2019-05-28 ENCOUNTER — HOSPITAL ENCOUNTER (OUTPATIENT)
Dept: INFUSION THERAPY | Age: 64
Discharge: HOME | End: 2019-05-28
Payer: MEDICARE

## 2019-05-28 DIAGNOSIS — C92.00 ACUTE MYELOID LEUKEMIA NOT HAVING ACHIEVED REMISSION (HCC): Primary | ICD-10-CM

## 2019-05-28 DIAGNOSIS — E83.42 HYPOMAGNESEMIA: ICD-10-CM

## 2019-05-28 DIAGNOSIS — C95.00 ACUTE LEUKEMIA NOT HAVING ACHIEVED REMISSION (HCC): ICD-10-CM

## 2019-05-28 LAB
ABO/RH: NORMAL
ALBUMIN SERPL-MCNC: 2.9 G/DL (ref 3.5–5.2)
ALP BLD-CCNC: 102 U/L (ref 35–104)
ALT SERPL-CCNC: 10 U/L (ref 0–32)
ANION GAP SERPL CALCULATED.3IONS-SCNC: 10 MMOL/L (ref 7–16)
ANTIBODY SCREEN: NORMAL
AST SERPL-CCNC: 10 U/L (ref 0–31)
BASOPHILS ABSOLUTE: 0 E9/L (ref 0–0.2)
BASOPHILS RELATIVE PERCENT: 0 % (ref 0–2)
BILIRUB SERPL-MCNC: <0.2 MG/DL (ref 0–1.2)
BUN BLDV-MCNC: 18 MG/DL (ref 8–23)
CALCIUM SERPL-MCNC: 9.5 MG/DL (ref 8.6–10.2)
CHLORIDE BLD-SCNC: 96 MMOL/L (ref 98–107)
CO2: 32 MMOL/L (ref 22–29)
CREAT SERPL-MCNC: 0.8 MG/DL (ref 0.5–1)
EOSINOPHILS ABSOLUTE: 0 E9/L (ref 0.05–0.5)
EOSINOPHILS RELATIVE PERCENT: 0 % (ref 0–6)
GFR AFRICAN AMERICAN: >60
GFR NON-AFRICAN AMERICAN: >60 ML/MIN/1.73
GLUCOSE BLD-MCNC: 270 MG/DL (ref 74–99)
HCT VFR BLD CALC: 20.3 % (ref 34–48)
HEMOGLOBIN: 7.2 G/DL (ref 11.5–15.5)
LYMPHOCYTES ABSOLUTE: 1.23 E9/L (ref 1.5–4)
LYMPHOCYTES RELATIVE PERCENT: 40.9 % (ref 20–42)
MAGNESIUM: 1.8 MG/DL (ref 1.6–2.6)
MCH RBC QN AUTO: 31.3 PG (ref 26–35)
MCHC RBC AUTO-ENTMCNC: 35.5 % (ref 32–34.5)
MCV RBC AUTO: 88.3 FL (ref 80–99.9)
MONOCYTES ABSOLUTE: 0.33 E9/L (ref 0.1–0.95)
MONOCYTES RELATIVE PERCENT: 11.3 % (ref 2–12)
NEUTROPHILS ABSOLUTE: 1.44 E9/L (ref 1.8–7.3)
NEUTROPHILS RELATIVE PERCENT: 47.8 % (ref 43–80)
NUCLEATED RED BLOOD CELLS: 0.9 /100 WBC
PDW BLD-RTO: 11.8 FL (ref 11.5–15)
PLATELET # BLD: 34 E9/L (ref 130–450)
PLATELET CONFIRMATION: NORMAL
PMV BLD AUTO: 10.5 FL (ref 7–12)
POTASSIUM SERPL-SCNC: 4 MMOL/L (ref 3.5–5)
RBC # BLD: 2.3 E12/L (ref 3.5–5.5)
SODIUM BLD-SCNC: 138 MMOL/L (ref 132–146)
TOTAL PROTEIN: 6 G/DL (ref 6.4–8.3)
URIC ACID, SERUM: 3.6 MG/DL (ref 2.4–5.7)
WBC # BLD: 3 E9/L (ref 4.5–11.5)

## 2019-05-28 PROCEDURE — 6360000002 HC RX W HCPCS

## 2019-05-28 PROCEDURE — 83735 ASSAY OF MAGNESIUM: CPT

## 2019-05-28 PROCEDURE — 86850 RBC ANTIBODY SCREEN: CPT

## 2019-05-28 PROCEDURE — 86901 BLOOD TYPING SEROLOGIC RH(D): CPT

## 2019-05-28 PROCEDURE — 80053 COMPREHEN METABOLIC PANEL: CPT

## 2019-05-28 PROCEDURE — 2580000003 HC RX 258: Performed by: NURSE PRACTITIONER

## 2019-05-28 PROCEDURE — 86902 BLOOD TYPE ANTIGEN DONOR EA: CPT | Performed by: NURSE PRACTITIONER

## 2019-05-28 PROCEDURE — 86902 BLOOD TYPE ANTIGEN DONOR EA: CPT

## 2019-05-28 PROCEDURE — 85025 COMPLETE CBC W/AUTO DIFF WBC: CPT

## 2019-05-28 PROCEDURE — 86922 COMPATIBILITY TEST ANTIGLOB: CPT

## 2019-05-28 PROCEDURE — 84550 ASSAY OF BLOOD/URIC ACID: CPT

## 2019-05-28 PROCEDURE — 86900 BLOOD TYPING SEROLOGIC ABO: CPT

## 2019-05-28 PROCEDURE — 36591 DRAW BLOOD OFF VENOUS DEVICE: CPT

## 2019-05-28 RX ORDER — DIPHENHYDRAMINE HYDROCHLORIDE 50 MG/ML
50 INJECTION INTRAMUSCULAR; INTRAVENOUS ONCE
Status: CANCELLED | OUTPATIENT
Start: 2019-05-28

## 2019-05-28 RX ORDER — HEPARIN SODIUM (PORCINE) LOCK FLUSH IV SOLN 100 UNIT/ML 100 UNIT/ML
SOLUTION INTRAVENOUS
Status: COMPLETED
Start: 2019-05-28 | End: 2019-05-28

## 2019-05-28 RX ORDER — SODIUM CHLORIDE 0.9 % (FLUSH) 0.9 %
10 SYRINGE (ML) INJECTION PRN
Status: DISCONTINUED | OUTPATIENT
Start: 2019-05-28 | End: 2019-05-29 | Stop reason: HOSPADM

## 2019-05-28 RX ORDER — ACETAMINOPHEN 325 MG/1
650 TABLET ORAL ONCE
Status: CANCELLED | OUTPATIENT
Start: 2019-05-30

## 2019-05-28 RX ORDER — EPINEPHRINE 1 MG/ML
0.3 INJECTION, SOLUTION, CONCENTRATE INTRAVENOUS PRN
Status: CANCELLED | OUTPATIENT
Start: 2019-05-28

## 2019-05-28 RX ORDER — 0.9 % SODIUM CHLORIDE 0.9 %
250 INTRAVENOUS SOLUTION INTRAVENOUS ONCE
Status: CANCELLED | OUTPATIENT
Start: 2019-05-28

## 2019-05-28 RX ORDER — SODIUM CHLORIDE 0.9 % (FLUSH) 0.9 %
20 SYRINGE (ML) INJECTION PRN
Status: CANCELLED | OUTPATIENT
Start: 2019-05-28

## 2019-05-28 RX ORDER — HEPARIN SODIUM (PORCINE) LOCK FLUSH IV SOLN 100 UNIT/ML 100 UNIT/ML
500 SOLUTION INTRAVENOUS PRN
Status: CANCELLED | OUTPATIENT
Start: 2019-05-28

## 2019-05-28 RX ORDER — SODIUM CHLORIDE 9 MG/ML
INJECTION, SOLUTION INTRAVENOUS CONTINUOUS
Status: CANCELLED | OUTPATIENT
Start: 2019-05-28

## 2019-05-28 RX ORDER — DIPHENHYDRAMINE HCL 25 MG
25 TABLET ORAL ONCE
Status: CANCELLED | OUTPATIENT
Start: 2019-05-30

## 2019-05-28 RX ORDER — MAGNESIUM SULFATE IN WATER 40 MG/ML
2 INJECTION, SOLUTION INTRAVENOUS ONCE
Status: CANCELLED
Start: 2019-05-28

## 2019-05-28 RX ORDER — SODIUM CHLORIDE 0.9 % (FLUSH) 0.9 %
10 SYRINGE (ML) INJECTION PRN
Status: CANCELLED | OUTPATIENT
Start: 2019-05-28

## 2019-05-28 RX ORDER — METHYLPREDNISOLONE SODIUM SUCCINATE 125 MG/2ML
125 INJECTION, POWDER, LYOPHILIZED, FOR SOLUTION INTRAMUSCULAR; INTRAVENOUS ONCE
Status: CANCELLED | OUTPATIENT
Start: 2019-05-28

## 2019-05-28 RX ORDER — HEPARIN SODIUM (PORCINE) LOCK FLUSH IV SOLN 100 UNIT/ML 100 UNIT/ML
500 SOLUTION INTRAVENOUS PRN
Status: DISCONTINUED | OUTPATIENT
Start: 2019-05-28 | End: 2019-05-29 | Stop reason: HOSPADM

## 2019-05-28 RX ORDER — 0.9 % SODIUM CHLORIDE 0.9 %
10 VIAL (ML) INJECTION ONCE
Status: CANCELLED | OUTPATIENT
Start: 2019-05-28

## 2019-05-28 RX ADMIN — Medication: at 11:00

## 2019-05-28 RX ADMIN — HEPARIN SODIUM (PORCINE) LOCK FLUSH IV SOLN 100 UNIT/ML: 100 SOLUTION at 11:00

## 2019-05-28 RX ADMIN — Medication 10 ML: at 15:01

## 2019-05-28 NOTE — PROGRESS NOTES
PORT FLUSH    Patient presents to clinic for PICC LINE LAB DRAW AND Flush today. KAMRYN PICC LINE accessed per policy for good blood return as long as the patient holds her right arm over her head. .  Aspirate for waste and specimen sent to lab. Site flushed easily with 10 mL NSS followed by 5 mL Heparin solution 100 units/ml rinse prior to de-access  Tolerated procedure well. Encouraged to schedule port flush every 4 weeks. No OV today.

## 2019-05-29 RX ORDER — 0.9 % SODIUM CHLORIDE 0.9 %
250 INTRAVENOUS SOLUTION INTRAVENOUS ONCE
Status: CANCELLED | OUTPATIENT
Start: 2019-05-29

## 2019-05-29 RX ORDER — ACETAMINOPHEN 325 MG/1
650 TABLET ORAL ONCE
Status: CANCELLED | OUTPATIENT
Start: 2019-05-29

## 2019-05-29 RX ORDER — DIPHENHYDRAMINE HCL 25 MG
25 TABLET ORAL ONCE
Status: CANCELLED | OUTPATIENT
Start: 2019-05-29

## 2019-05-29 RX ORDER — SODIUM CHLORIDE 0.9 % (FLUSH) 0.9 %
20 SYRINGE (ML) INJECTION PRN
Status: CANCELLED | OUTPATIENT
Start: 2019-05-29

## 2019-05-29 RX ORDER — SODIUM CHLORIDE 9 MG/ML
INJECTION, SOLUTION INTRAVENOUS CONTINUOUS
Status: CANCELLED | OUTPATIENT
Start: 2019-05-29

## 2019-05-29 RX ORDER — DIPHENHYDRAMINE HYDROCHLORIDE 50 MG/ML
50 INJECTION INTRAMUSCULAR; INTRAVENOUS ONCE
Status: CANCELLED | OUTPATIENT
Start: 2019-05-29

## 2019-05-29 RX ORDER — 0.9 % SODIUM CHLORIDE 0.9 %
10 VIAL (ML) INJECTION ONCE
Status: CANCELLED | OUTPATIENT
Start: 2019-05-29

## 2019-05-29 RX ORDER — METHYLPREDNISOLONE SODIUM SUCCINATE 125 MG/2ML
125 INJECTION, POWDER, LYOPHILIZED, FOR SOLUTION INTRAMUSCULAR; INTRAVENOUS ONCE
Status: CANCELLED | OUTPATIENT
Start: 2019-05-29

## 2019-05-29 RX ORDER — EPINEPHRINE 1 MG/ML
0.3 INJECTION, SOLUTION, CONCENTRATE INTRAVENOUS PRN
Status: CANCELLED | OUTPATIENT
Start: 2019-05-29

## 2019-05-30 ENCOUNTER — OFFICE VISIT (OUTPATIENT)
Dept: ONCOLOGY | Age: 64
End: 2019-05-30
Payer: MEDICARE

## 2019-05-30 ENCOUNTER — HOSPITAL ENCOUNTER (OUTPATIENT)
Dept: INFUSION THERAPY | Age: 64
Discharge: HOME | End: 2019-05-30
Payer: MEDICARE

## 2019-05-30 VITALS
HEART RATE: 81 BPM | RESPIRATION RATE: 20 BRPM | DIASTOLIC BLOOD PRESSURE: 47 MMHG | TEMPERATURE: 98 F | SYSTOLIC BLOOD PRESSURE: 114 MMHG

## 2019-05-30 VITALS
SYSTOLIC BLOOD PRESSURE: 145 MMHG | HEART RATE: 72 BPM | DIASTOLIC BLOOD PRESSURE: 65 MMHG | RESPIRATION RATE: 18 BRPM | TEMPERATURE: 98.2 F

## 2019-05-30 DIAGNOSIS — C95.00 ACUTE LEUKEMIA NOT HAVING ACHIEVED REMISSION (HCC): Primary | ICD-10-CM

## 2019-05-30 DIAGNOSIS — C92.00 ACUTE MYELOID LEUKEMIA NOT HAVING ACHIEVED REMISSION (HCC): Primary | ICD-10-CM

## 2019-05-30 DIAGNOSIS — E83.42 HYPOMAGNESEMIA: ICD-10-CM

## 2019-05-30 PROCEDURE — 2580000003 HC RX 258: Performed by: NURSE PRACTITIONER

## 2019-05-30 PROCEDURE — P9040 RBC LEUKOREDUCED IRRADIATED: HCPCS

## 2019-05-30 PROCEDURE — 6360000002 HC RX W HCPCS

## 2019-05-30 PROCEDURE — 99214 OFFICE O/P EST MOD 30 MIN: CPT | Performed by: INTERNAL MEDICINE

## 2019-05-30 PROCEDURE — 36430 TRANSFUSION BLD/BLD COMPNT: CPT

## 2019-05-30 RX ORDER — MAGNESIUM SULFATE IN WATER 40 MG/ML
2 INJECTION, SOLUTION INTRAVENOUS ONCE
Status: CANCELLED
Start: 2019-05-30

## 2019-05-30 RX ORDER — EPINEPHRINE 1 MG/ML
0.3 INJECTION, SOLUTION, CONCENTRATE INTRAVENOUS PRN
Status: CANCELLED | OUTPATIENT
Start: 2019-05-30

## 2019-05-30 RX ORDER — SODIUM CHLORIDE 0.9 % (FLUSH) 0.9 %
20 SYRINGE (ML) INJECTION PRN
Status: CANCELLED | OUTPATIENT
Start: 2019-05-30

## 2019-05-30 RX ORDER — HEPARIN SODIUM (PORCINE) LOCK FLUSH IV SOLN 100 UNIT/ML 100 UNIT/ML
500 SOLUTION INTRAVENOUS PRN
Status: DISCONTINUED | OUTPATIENT
Start: 2019-05-30 | End: 2019-05-31 | Stop reason: HOSPADM

## 2019-05-30 RX ORDER — SODIUM CHLORIDE 0.9 % (FLUSH) 0.9 %
10 SYRINGE (ML) INJECTION PRN
Status: CANCELLED | OUTPATIENT
Start: 2019-05-30

## 2019-05-30 RX ORDER — DIPHENHYDRAMINE HCL 25 MG
25 TABLET ORAL ONCE
Status: CANCELLED | OUTPATIENT
Start: 2019-05-30

## 2019-05-30 RX ORDER — HEPARIN SODIUM (PORCINE) LOCK FLUSH IV SOLN 100 UNIT/ML 100 UNIT/ML
500 SOLUTION INTRAVENOUS PRN
Status: CANCELLED | OUTPATIENT
Start: 2019-05-30

## 2019-05-30 RX ORDER — 0.9 % SODIUM CHLORIDE 0.9 %
250 INTRAVENOUS SOLUTION INTRAVENOUS ONCE
Status: CANCELLED | OUTPATIENT
Start: 2019-05-30

## 2019-05-30 RX ORDER — HEPARIN SODIUM (PORCINE) LOCK FLUSH IV SOLN 100 UNIT/ML 100 UNIT/ML
SOLUTION INTRAVENOUS
Status: COMPLETED
Start: 2019-05-30 | End: 2019-05-30

## 2019-05-30 RX ORDER — 0.9 % SODIUM CHLORIDE 0.9 %
250 INTRAVENOUS SOLUTION INTRAVENOUS ONCE
Status: COMPLETED | OUTPATIENT
Start: 2019-05-30 | End: 2019-05-30

## 2019-05-30 RX ORDER — DIPHENHYDRAMINE HYDROCHLORIDE 50 MG/ML
50 INJECTION INTRAMUSCULAR; INTRAVENOUS ONCE
Status: CANCELLED | OUTPATIENT
Start: 2019-05-30

## 2019-05-30 RX ORDER — ACETAMINOPHEN 325 MG/1
650 TABLET ORAL ONCE
Status: CANCELLED | OUTPATIENT
Start: 2019-05-30

## 2019-05-30 RX ORDER — SODIUM CHLORIDE 0.9 % (FLUSH) 0.9 %
10 SYRINGE (ML) INJECTION PRN
Status: DISCONTINUED | OUTPATIENT
Start: 2019-05-30 | End: 2019-05-31 | Stop reason: HOSPADM

## 2019-05-30 RX ORDER — SODIUM CHLORIDE 9 MG/ML
INJECTION, SOLUTION INTRAVENOUS CONTINUOUS
Status: CANCELLED | OUTPATIENT
Start: 2019-05-30

## 2019-05-30 RX ORDER — METHYLPREDNISOLONE SODIUM SUCCINATE 125 MG/2ML
125 INJECTION, POWDER, LYOPHILIZED, FOR SOLUTION INTRAMUSCULAR; INTRAVENOUS ONCE
Status: CANCELLED | OUTPATIENT
Start: 2019-05-30

## 2019-05-30 RX ORDER — 0.9 % SODIUM CHLORIDE 0.9 %
10 VIAL (ML) INJECTION ONCE
Status: CANCELLED | OUTPATIENT
Start: 2019-05-30

## 2019-05-30 RX ADMIN — HEPARIN SODIUM (PORCINE) LOCK FLUSH IV SOLN 100 UNIT/ML 500 UNITS: 100 SOLUTION at 11:42

## 2019-05-30 RX ADMIN — Medication 500 UNITS: at 11:42

## 2019-05-30 RX ADMIN — Medication 10 ML: at 11:42

## 2019-05-30 RX ADMIN — SODIUM CHLORIDE 250 ML: 9 INJECTION, SOLUTION INTRAVENOUS at 09:45

## 2019-05-30 NOTE — PATIENT INSTRUCTIONS
Patient Education        Acute Myelogenous Leukemia: Care Instructions  Your Care Instructions    Acute myelogenous leukemia (AML) is a cancer of the blood cells. In leukemia, the body makes too many of certain blood cells, especially white blood cells, and they may not work well. White blood cells are a part of the immune system, which helps protect the body from infection and disease. In AML, the white cells are not mature and are not able to help the body fight infection. The leukemia cells can crowd out the healthy blood cells and can collect in other organs, such as the spleen. The most common treatment for AML is chemotherapy. You also may get medicines to help with some of the side effects of treatment, including nausea and tiredness. When you find out that you have cancer, you may feel many emotions and may need some help coping. Seek out family, friends, and counselors for support. You also can do things at home to make yourself feel better while you go through treatment. Call the [employer] ([phone]) or visit its website at [address] [employer] for more information. Follow-up care is a key part of your treatment and safety. Be sure to make and go to all appointments, and call your doctor if you are having problems. It's also a good idea to know your test results and keep a list of the medicines you take. How can you care for yourself at home? · Take your medicines exactly as prescribed. Call your doctor if you think you are having a problem with your medicine. You may get medicine for nausea and vomiting if you have these side effects. · Eat healthy food. If you do not feel like eating, try to eat food that has protein and extra calories to keep up your strength and prevent weight loss. Drink liquid meal replacements for extra calories and protein. Try to eat your main meal early. · Get some physical activity every day, but do not get too tired.  Keep doing the hobbies you enjoy as your energy allows. · Take steps to control your stress and workload. Learn relaxation techniques. ? Share your feelings. Stress and tension affect our emotions. By expressing your feelings to others, you may be able to understand and cope with them. ? Consider joining a support group. Talking about a problem with your spouse, a good friend, or other people with similar problems is a good way to reduce tension and stress. ? Express yourself through art. Try writing, dance, art, or crafts to relieve tension. Some dance, writing, or art groups may be available just for people who have cancer. ? Be kind to your body and mind. Getting enough sleep, eating a healthy diet, and taking time to do things you enjoy can contribute to an overall feeling of balance in your life and help reduce stress. ? Get help if you need it. Discuss your concerns with your doctor or counselor. · If you are vomiting or have diarrhea:  ? Drink plenty of fluids (enough so that your urine is light yellow or clear like water) to prevent dehydration. Choose water and other caffeine-free clear liquids. If you have kidney, heart, or liver disease and have to limit fluids, talk with your doctor before you increase the amount of fluids you drink. ? When you are able to eat, try clear soups, mild foods, and liquids until all symptoms are gone for 12 to 48 hours. Other good choices include dry toast, crackers, cooked cereal, and gelatin dessert, such as Jell-O.  · If you have not already done so, prepare a list of advance directives. Advance directives are instructions to your doctor and family members about what kind of care you want if you become unable to speak or express yourself. When should you call for help? Call 911 anytime you think you may need emergency care.  For example, call if:    · You passed out (lost consciousness).    Call your doctor now or seek immediate medical care if:    · You have a fever.     · You have abnormal bleeding.     · You think you have an infection.     · You have new or worse pain.     · You have new symptoms, such as a cough, belly pain, vomiting, diarrhea, or a rash.    Watch closely for changes in your health, and be sure to contact your doctor if:    · You are much more tired than usual.     · You have swollen glands in your armpits, groin, or neck.     · You do not get better as expected. Where can you learn more? Go to https://[employer]. org and sign in to your Last[employer]fm account. Enter U130 in the [employer] box to learn more about \"Acute Myelogenous Leukemia: Care Instructions. \"     If you do not have an account, please click on the \"Sign Up Now\" link. Current as of: December 19, 2018  Content Version: 12.0  © 4641-4169 Healthwise, Incorporated. Care instructions adapted under license by [facility] If you have questions about a medical condition or this instruction, always ask your healthcare professional. [place] [address] any warranty or liability for your use of this information.

## 2019-05-30 NOTE — PROGRESS NOTES
[address] [facility] [id number]  Dept: [phone]  Loc: [phone]  Attending progress Note       Reason for Visit:   AML.    Referring Physician: Dr. Maria Ines Dowd (C/Wali Anaya).     PCP:  Bertin Robertson MD     History of Present Illness:      The patient is a pleasant 58-year-old lady with a past medical history significant for hypertension, type II diabetes mellitus, hyperlipidemia, coronary artery disease, status post stents placement, morbid obesity, who had presented to [facility] [place] for his near syncopal episode, weakness and profound fatigue, she was found to have a white count of 31.3, hemoglobin was 5.8, platelet count 06,775, she was transferred to [facility] with concern for acute leukemia, she had a bone marrow biopsy done on 11/23/2018, 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 NGS panel- IDH1, ADMF83, CBL slice site. Due to the patient's D: Addition to this intake, requiring significant assistance with mobility without ECOG 2-3, the patient was treated with azacitidine 75 mg/m² ×7 day course between 1127 and 12/3/2018, no tumor lysis was observed, she was on allopurinol for TLS prophylaxis, and acyclovir the year and the voriconazole for infection prophylaxis while inpatient, she was discharged on acyclovir, fluconazole and ciprofloxacin, which she has been taking. The patient is feeling tired, no chest pain or shortness of breath, no bleeding. She has lower leg edema, she had venous ultrasounds done they were negative for DVT, she also has history of lower extremities ulcers, she was referred to the wound clinic at [facility].      The patient saw Dr. Bonifacio Blank at [facility] on 12/31/2018, and had a BM bx done on 1/3.   She saw Dr. Fercho Holden on 1/10, the blasts in the bone marrow had decreased to 4%, she will be seen again at [facility] on Thursday, plan for admission on Monday, 1/21/2019 for induction chemotherapy with 7+3.     She was admitted from 1/21/19-02/12/2019 for standard induction with idarubicin and cytarabine on 7+3 schedule.      Her chemotherapy course overall went well,was complicated by hyperglycemia. She was admitted 02/28/2019 for influenza and pneumonia.      She had a bone marrow biopsy on 03/11/2019 confirms complete remission by histology,flow and FISH studies. She received consolidation cycle #1 given 04/22 -04/27 with modified high dose cytarabine.     Since her last visit, the patient had presented to [facility] with an altered mental status, she was found to be septic, was transferred to [facility], she had bacteremia and post infection, which has been removed, she has a PICC line, we don't have the records available, she is on ceftriaxone until 6/5/2019. She has an appointment with Dr. Jaylin Jacobs this afternoon.     Review of Systems;  CONSTITUTIONAL: No fever, chills. Fair appetite, improved energy level. ENMT: Eyes: No diplopia; Nose: No epistaxis. Mouth: No sore throat. RESPIRATORY: No hemoptysis, shortness of breath, cough. CARDIOVASCULAR: No chest pain, palpitations. GASTROINTESTINAL: No nausea/vomiting, abdominal pain,no diarrhea, no constipation. GENITOURINARY: No dysuria, urinary frequency, hematuria. NEURO: No syncope, presyncope, headache.   Remainder:  ROS NEGATIVE     Past Medical History:  Past Medical History             Diagnosis Date    CAD (coronary artery disease)      Cancer ([address]      GERD (gastroesophageal reflux disease)      Hyperlipidemia      Hypertension      Obesity      Osteoarthritis      Type 2 diabetes mellitus without complication ([address]               Patient Active Problem List   Diagnosis    Acute leukemia not having achieved remission ([address]    Generalized weakness    Sepsis ([address]         Past Surgical History:  Past Surgical History             Procedure Laterality Date    CARDIAC CATHETERIZATION        CHOLECYSTECTOMY        DILATION AND CURETTAGE OF UTERUS        FOOT SURGERY Left              Family History:  Family History         Family History   Problem Relation Age of Onset    Coronary Art Dis Mother      Stroke Mother      Diabetes Mother      Coronary Art Dis Father      Diabetes Father      Diabetes Sister      Cancer Paternal Aunt           Bone            Medications:  Reviewed and reconciled.     Social History:  Social History               Socioeconomic History    Marital status:        Spouse name: Not on file    Number of children: Not on file    Years of education: Not on file    Highest education level: Not on file   Occupational History    Not on file   Social Needs    Financial resource strain: Not on file    Food insecurity:       Worry: Not on file       Inability: Not on file    Transportation needs:       Medical: Not on file       Non-medical: Not on file   Tobacco Use    Smoking status: Never Smoker    Smokeless tobacco: Never Used   Substance and Sexual Activity    Alcohol use: No    Drug use: No    Sexual activity: Not Currently       Partners: Male   Lifestyle    Physical activity:       Days per week: Not on file       Minutes per session: Not on file    Stress: Not on file   Relationships    Social connections:       Talks on phone: Not on file       Gets together: Not on file       Attends Orthodox service: Not on file       Active member of club or organization: Not on file       Attends meetings of clubs or organizations: Not on file       Relationship status: Not on file    Intimate partner violence:       Fear of current or ex partner: Not on file       Emotionally abused: Not on file       Physically abused: Not on file       Forced sexual activity: Not on file   Other Topics Concern    Not on file   Social History Narrative    Not on file            Allergies:  No Known Allergies     Physical Exam:  BP (!) 147/88 (Site: Left Lower Arm, Position: Sitting, Cuff Size: Small Adult)   Pulse 98   Temp 98.9 °F (37.2 °C) (Oral)   Resp 20   Ht 5' 11\" (1.803 m)   BMI 43.52 kg/m²   GENERAL: Alert, oriented x 3, not in acute distress. She is in the wheelchair. HEENT: PERRLA; EOMI. Oropharynx clear. Positive for pallor. NECK: Supple. No palpable cervical or supraclavicular lymphadenopathy. LUNGS: Good air entry bilaterally. No wheezing, crackles or rhonchi. CARDIOVASCULAR: Regular rate. No murmurs, rubs or gallops. ABDOMEN: Soft. Non-tender, non-distended. Chest petechia or rash on her abdomen. Positive bowel sounds. EXTREMITIES: Bilateral lower leg edema, left greater than right, venous stasis changes. Right upper extremity PICC line. NEUROLOGIC: No focal deficits.      ECOG PS 2-3     Impression/Plan:      The patient is a pleasant 80-year-old lady with a past medical history significant for hypertension, type II diabetes mellitus, hyperlipidemia, coronary artery disease, status post stents placement, morbid obesity, who had presented to [facility] for his near syncopal episode, weakness and profound fatigue, she was found to have a white count of 31.3, hemoglobin was 5.8, platelet count 15,537, she was transferred to [facility] with concern for acute leukemia, she had a bone marrow biopsy done on 11/23/2018, 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 NGS panel- IDH1, WWPC15, CBL slice site. Due to the patient's D: Addition to this intake, requiring significant assistance with mobility without ECOG 2-3, the patient was treated with azacitidine 75 mg/m² ×7 day course between 1127 and 12/3/2018, no tumor lysis was observed, she was on allopurinol for TLS prophylaxis.        She was admitted from 1/21/19-02/12/2019 for standard induction with idarubicin and cytarabine on 7+3 schedule. Her chemotherapy course overall went well,was complicated by hyperglycemia.  She was admitted 02/28/2019 for influenza and

## 2019-06-03 ENCOUNTER — OFFICE VISIT (OUTPATIENT)
Dept: ONCOLOGY | Age: 64
End: 2019-06-03
Payer: MEDICARE

## 2019-06-03 ENCOUNTER — HOSPITAL ENCOUNTER (OUTPATIENT)
Dept: INFUSION THERAPY | Age: 64
Discharge: HOME | End: 2019-06-03
Payer: MEDICARE

## 2019-06-03 VITALS
BODY MASS INDEX: 41.02 KG/M2 | WEIGHT: 293 LBS | RESPIRATION RATE: 20 BRPM | TEMPERATURE: 98.4 F | SYSTOLIC BLOOD PRESSURE: 146 MMHG | HEART RATE: 78 BPM | DIASTOLIC BLOOD PRESSURE: 71 MMHG | HEIGHT: 71 IN

## 2019-06-03 DIAGNOSIS — C92.00 ACUTE MYELOID LEUKEMIA NOT HAVING ACHIEVED REMISSION (HCC): Primary | ICD-10-CM

## 2019-06-03 DIAGNOSIS — E83.42 HYPOMAGNESEMIA: ICD-10-CM

## 2019-06-03 DIAGNOSIS — C95.00 ACUTE LEUKEMIA NOT HAVING ACHIEVED REMISSION (HCC): ICD-10-CM

## 2019-06-03 LAB
ABO/RH: NORMAL
ALBUMIN SERPL-MCNC: 2.9 G/DL (ref 3.5–5.2)
ALP BLD-CCNC: 93 U/L (ref 35–104)
ALT SERPL-CCNC: 10 U/L (ref 0–32)
ANION GAP SERPL CALCULATED.3IONS-SCNC: 10 MMOL/L (ref 7–16)
ANTIBODY SCREEN: NORMAL
AST SERPL-CCNC: 13 U/L (ref 0–31)
BASOPHILS ABSOLUTE: 0.03 E9/L (ref 0–0.2)
BASOPHILS RELATIVE PERCENT: 1 % (ref 0–2)
BILIRUB SERPL-MCNC: 0.2 MG/DL (ref 0–1.2)
BUN BLDV-MCNC: 16 MG/DL (ref 8–23)
CALCIUM SERPL-MCNC: 9.2 MG/DL (ref 8.6–10.2)
CHLORIDE BLD-SCNC: 98 MMOL/L (ref 98–107)
CO2: 31 MMOL/L (ref 22–29)
CREAT SERPL-MCNC: 0.8 MG/DL (ref 0.5–1)
EOSINOPHILS ABSOLUTE: 0 E9/L (ref 0.05–0.5)
EOSINOPHILS RELATIVE PERCENT: 0 % (ref 0–6)
GFR AFRICAN AMERICAN: >60
GFR NON-AFRICAN AMERICAN: >60 ML/MIN/1.73
GLUCOSE BLD-MCNC: 247 MG/DL (ref 74–99)
HCT VFR BLD CALC: 25.2 % (ref 34–48)
HEMOGLOBIN: 8.6 G/DL (ref 11.5–15.5)
LYMPHOCYTES ABSOLUTE: 1.02 E9/L (ref 1.5–4)
LYMPHOCYTES RELATIVE PERCENT: 34 % (ref 20–42)
MAGNESIUM: 1.7 MG/DL (ref 1.6–2.6)
MCH RBC QN AUTO: 30.9 PG (ref 26–35)
MCHC RBC AUTO-ENTMCNC: 34.1 % (ref 32–34.5)
MCV RBC AUTO: 90.6 FL (ref 80–99.9)
MONOCYTES ABSOLUTE: 0.54 E9/L (ref 0.1–0.95)
MONOCYTES RELATIVE PERCENT: 18 % (ref 2–12)
NEUTROPHILS ABSOLUTE: 1.41 E9/L (ref 1.8–7.3)
NEUTROPHILS RELATIVE PERCENT: 47 % (ref 43–80)
NUCLEATED RED BLOOD CELLS: 4 /100 WBC
PDW BLD-RTO: 12.4 FL (ref 11.5–15)
PLATELET # BLD: 62 E9/L (ref 130–450)
PLATELET CONFIRMATION: NORMAL
PMV BLD AUTO: 10.7 FL (ref 7–12)
POLYCHROMASIA: ABNORMAL
POTASSIUM SERPL-SCNC: 4.3 MMOL/L (ref 3.5–5)
RBC # BLD: 2.78 E12/L (ref 3.5–5.5)
SODIUM BLD-SCNC: 139 MMOL/L (ref 132–146)
TOTAL PROTEIN: 5.9 G/DL (ref 6.4–8.3)
URIC ACID, SERUM: 4.1 MG/DL (ref 2.4–5.7)
WBC # BLD: 3 E9/L (ref 4.5–11.5)

## 2019-06-03 PROCEDURE — 84550 ASSAY OF BLOOD/URIC ACID: CPT

## 2019-06-03 PROCEDURE — 86922 COMPATIBILITY TEST ANTIGLOB: CPT

## 2019-06-03 PROCEDURE — 86901 BLOOD TYPING SEROLOGIC RH(D): CPT

## 2019-06-03 PROCEDURE — 80053 COMPREHEN METABOLIC PANEL: CPT

## 2019-06-03 PROCEDURE — 2580000003 HC RX 258: Performed by: NURSE PRACTITIONER

## 2019-06-03 PROCEDURE — 6360000002 HC RX W HCPCS: Performed by: NURSE PRACTITIONER

## 2019-06-03 PROCEDURE — 99214 OFFICE O/P EST MOD 30 MIN: CPT

## 2019-06-03 PROCEDURE — 83735 ASSAY OF MAGNESIUM: CPT

## 2019-06-03 PROCEDURE — 85025 COMPLETE CBC W/AUTO DIFF WBC: CPT

## 2019-06-03 PROCEDURE — 99214 OFFICE O/P EST MOD 30 MIN: CPT | Performed by: INTERNAL MEDICINE

## 2019-06-03 PROCEDURE — 86900 BLOOD TYPING SEROLOGIC ABO: CPT

## 2019-06-03 PROCEDURE — 36415 COLL VENOUS BLD VENIPUNCTURE: CPT

## 2019-06-03 PROCEDURE — 86850 RBC ANTIBODY SCREEN: CPT

## 2019-06-03 RX ORDER — SODIUM CHLORIDE 0.9 % (FLUSH) 0.9 %
10 SYRINGE (ML) INJECTION PRN
Status: CANCELLED | OUTPATIENT
Start: 2019-06-03

## 2019-06-03 RX ORDER — HEPARIN SODIUM (PORCINE) LOCK FLUSH IV SOLN 100 UNIT/ML 100 UNIT/ML
500 SOLUTION INTRAVENOUS PRN
Status: DISCONTINUED | OUTPATIENT
Start: 2019-06-03 | End: 2019-06-04 | Stop reason: HOSPADM

## 2019-06-03 RX ORDER — SODIUM CHLORIDE 0.9 % (FLUSH) 0.9 %
10 SYRINGE (ML) INJECTION PRN
Status: DISCONTINUED | OUTPATIENT
Start: 2019-06-03 | End: 2019-06-04 | Stop reason: HOSPADM

## 2019-06-03 RX ORDER — MAGNESIUM SULFATE IN WATER 40 MG/ML
2 INJECTION, SOLUTION INTRAVENOUS ONCE
Status: DISCONTINUED | OUTPATIENT
Start: 2019-06-03 | End: 2019-06-03 | Stop reason: CLARIF

## 2019-06-03 RX ORDER — MAGNESIUM SULFATE IN WATER 40 MG/ML
2 INJECTION, SOLUTION INTRAVENOUS ONCE
Status: CANCELLED
Start: 2019-06-03

## 2019-06-03 RX ORDER — HEPARIN SODIUM (PORCINE) LOCK FLUSH IV SOLN 100 UNIT/ML 100 UNIT/ML
500 SOLUTION INTRAVENOUS PRN
Status: CANCELLED | OUTPATIENT
Start: 2019-06-03

## 2019-06-03 RX ADMIN — Medication 10 ML: at 10:30

## 2019-06-03 RX ADMIN — Medication 500 UNITS: at 10:32

## 2019-06-03 RX ADMIN — Medication 10 ML: at 10:25

## 2019-06-03 NOTE — PROGRESS NOTES
[address] [facility] [address] [id number]  Dept: [phone]  Loc: [phone]  Attending progress Note       Reason for Visit:   AML.    Referring Physician: Dr. Jasiel Patel (C/Wali Anaya).     PCP:  Griffin Barajas MD     History of Present Illness:      The patient is a pleasant 80-year-old lady with a past medical history significant for hypertension, type II diabetes mellitus, hyperlipidemia, coronary artery disease, status post stents placement, morbid obesity, who had presented to [facility] for his near syncopal episode, weakness and profound fatigue, she was found to have a white count of 31.3, hemoglobin was 5.8, platelet count 77,207, she was transferred to [facility] with concern for acute leukemia, she had a bone marrow biopsy done on 11/23/2018, 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 NGS panel- IDH1, EUIJ05, CBL slice site. Due to the patient's D: Addition to this intake, requiring significant assistance with mobility without ECOG 2-3, the patient was treated with azacitidine 75 mg/m² ×7 day course between 1127 and 12/3/2018, no tumor lysis was observed, she was on allopurinol for TLS prophylaxis, and acyclovir the year and the voriconazole for infection prophylaxis while inpatient, she was discharged on acyclovir, fluconazole and ciprofloxacin, which she has been taking. The patient is feeling tired, no chest pain or shortness of breath, no bleeding. She has lower leg edema, she had venous ultrasounds done they were negative for DVT, she also has history of lower extremities ulcers, she was referred to the wound clinic at [facility].      The patient saw Dr. Ara Rubio at [facility] on 12/31/2018, and had a BM bx done on 1/3.   She saw Dr. Sam Walker on 1/10, the blasts in the bone marrow had decreased to 4%, she will be seen again at [facility] on Thursday, plan for admission on Monday, 1/21/2019 for induction chemotherapy with 7+3.     She was admitted from 1/21/19-02/12/2019 for standard induction with idarubicin and cytarabine on 7+3 schedule.      Her chemotherapy course overall went well,was complicated by hyperglycemia. She was admitted 02/28/2019 for influenza and pneumonia.      She had a bone marrow biopsy on 03/11/2019 confirms complete remission by histology,flow and FISH studies. She received consolidation cycle #1 given 04/22 -04/27 with modified high dose cytarabine.     Since her last visit, the patient had presented to [facility] with an altered mental status, she was found to be septic, was transferred to [facility], she had bacteremia and post infection, which has been removed, she has a PICC line, we don't have the records available, she is on ceftriaxone until 6/5/2019. She was seen by Dr. Alka Calderon last week, she was told that she will receive treatment with Vidaza, rather than high-dose cytarabine.     Review of Systems;  CONSTITUTIONAL: No fever, chills. Fair appetite, improved energy level. ENMT: Eyes: No diplopia; Nose: No epistaxis. Mouth: No sore throat. RESPIRATORY: No hemoptysis, shortness of breath, cough. CARDIOVASCULAR: No chest pain, palpitations. GASTROINTESTINAL: No nausea/vomiting, abdominal pain,no diarrhea, no constipation. GENITOURINARY: No dysuria, urinary frequency, hematuria. NEURO: No syncope, presyncope, headache.   Remainder:  ROS NEGATIVE     Past Medical History:  Past Medical History             Diagnosis Date    CAD (coronary artery disease)      Cancer ([address]      GERD (gastroesophageal reflux disease)      Hyperlipidemia      Hypertension      Obesity      Osteoarthritis      Type 2 diabetes mellitus without complication ([address]               Patient Active Problem List   Diagnosis    Acute leukemia not having achieved remission ([address]    Generalized weakness    Sepsis ([address]         Past Surgical History:  Past Surgical History Procedure Laterality Date    CARDIAC CATHETERIZATION        CHOLECYSTECTOMY        DILATION AND CURETTAGE OF UTERUS        FOOT SURGERY Left              Family History:  Family History         Family History   Problem Relation Age of Onset    Coronary Art Dis Mother      Stroke Mother      Diabetes Mother      Coronary Art Dis Father      Diabetes Father      Diabetes Sister      Cancer Paternal Aunt           Bone            Medications:  Reviewed and reconciled.     Social History:  Social History               Socioeconomic History    Marital status:        Spouse name: Not on file    Number of children: Not on file    Years of education: Not on file    Highest education level: Not on file   Occupational History    Not on file   Social Needs    Financial resource strain: Not on file    Food insecurity:       Worry: Not on file       Inability: Not on file    Transportation needs:       Medical: Not on file       Non-medical: Not on file   Tobacco Use    Smoking status: Never Smoker    Smokeless tobacco: Never Used   Substance and Sexual Activity    Alcohol use: No    Drug use: No    Sexual activity: Not Currently       Partners: Male   Lifestyle    Physical activity:       Days per week: Not on file       Minutes per session: Not on file    Stress: Not on file   Relationships    Social connections:       Talks on phone: Not on file       Gets together: Not on file       Attends Religion service: Not on file       Active member of club or organization: Not on file       Attends meetings of clubs or organizations: Not on file       Relationship status: Not on file    Intimate partner violence:       Fear of current or ex partner: Not on file       Emotionally abused: Not on file       Physically abused: Not on file       Forced sexual activity: Not on file   Other Topics Concern    Not on file   Social History Narrative    Not on file            Allergies:  No Known went well,was complicated by hyperglycemia. She was admitted 02/28/2019 for influenza and pneumonia. She had a bone marrow biopsy on 03/11/2019 confirms complete remission by histology,flow and FISH studies. She received consolidation cycle #1 given 04/22 -04/27 with modified high dose cytarabine.     The patient had presented to [facility] with an altered mental status, she was found to be septic, was transferred to [facility], she had bacteremia and post infection, which has been removed, she has a PICC line, we don't have the records available, she is on ceftriaxone until 6/5/2019. She was seen by Dr. Wesley Zacarias last week, she will receive Vidaza incident of the high dose ranulfo-C due to the severe myelosuppression she had with the last treatment. Labs reviewed, white count is 3, ANC is 1410, hemoglobin is 8.6, hematocrit 25.6, platelet count 23,693, had overall improved. No need for blood products transfusion today. Records have been requested from [facility]. Patient prefers to receive the Vidaza locally, await records from Dr. Dion Granger office. RTC 6/6/2019.     Dipti Junior MD   HEMATOLOGY/MEDICAL [facility]  [address] [id number]  Dept: Eileen: [phone]

## 2019-06-06 ENCOUNTER — OFFICE VISIT (OUTPATIENT)
Dept: ONCOLOGY | Age: 64
End: 2019-06-06
Payer: MEDICARE

## 2019-06-06 ENCOUNTER — HOSPITAL ENCOUNTER (OUTPATIENT)
Dept: INFUSION THERAPY | Age: 64
Discharge: HOME | End: 2019-06-06
Payer: MEDICARE

## 2019-06-06 VITALS
SYSTOLIC BLOOD PRESSURE: 111 MMHG | TEMPERATURE: 98 F | HEART RATE: 75 BPM | DIASTOLIC BLOOD PRESSURE: 49 MMHG | RESPIRATION RATE: 20 BRPM

## 2019-06-06 DIAGNOSIS — C92.00 ACUTE MYELOID LEUKEMIA NOT HAVING ACHIEVED REMISSION (HCC): Primary | ICD-10-CM

## 2019-06-06 DIAGNOSIS — C92.00 ACUTE MYELOID LEUKEMIA NOT HAVING ACHIEVED REMISSION (HCC): ICD-10-CM

## 2019-06-06 DIAGNOSIS — C95.00 ACUTE LEUKEMIA NOT HAVING ACHIEVED REMISSION (HCC): ICD-10-CM

## 2019-06-06 LAB
ABO/RH: NORMAL
ALBUMIN SERPL-MCNC: 3.4 G/DL (ref 3.5–5.2)
ALP BLD-CCNC: 100 U/L (ref 35–104)
ALT SERPL-CCNC: 12 U/L (ref 0–32)
ANION GAP SERPL CALCULATED.3IONS-SCNC: 13 MMOL/L (ref 7–16)
ANTIBODY SCREEN: NORMAL
AST SERPL-CCNC: 15 U/L (ref 0–31)
BASOPHILS ABSOLUTE: 0 E9/L (ref 0–0.2)
BASOPHILS RELATIVE PERCENT: 0 % (ref 0–2)
BILIRUB SERPL-MCNC: 0.3 MG/DL (ref 0–1.2)
BLOOD BANK DISPENSE STATUS: NORMAL
BLOOD BANK DISPENSE STATUS: NORMAL
BLOOD BANK PRODUCT CODE: NORMAL
BLOOD BANK PRODUCT CODE: NORMAL
BPU ID: NORMAL
BPU ID: NORMAL
BUN BLDV-MCNC: 15 MG/DL (ref 8–23)
CALCIUM SERPL-MCNC: 9.5 MG/DL (ref 8.6–10.2)
CHLORIDE BLD-SCNC: 99 MMOL/L (ref 98–107)
CO2: 28 MMOL/L (ref 22–29)
CREAT SERPL-MCNC: 0.8 MG/DL (ref 0.5–1)
DESCRIPTION BLOOD BANK: NORMAL
DESCRIPTION BLOOD BANK: NORMAL
EOSINOPHILS ABSOLUTE: 0 E9/L (ref 0.05–0.5)
EOSINOPHILS RELATIVE PERCENT: 0 % (ref 0–6)
GFR AFRICAN AMERICAN: >60
GFR NON-AFRICAN AMERICAN: >60 ML/MIN/1.73
GLUCOSE BLD-MCNC: 264 MG/DL (ref 74–99)
HCT VFR BLD CALC: 24.9 % (ref 34–48)
HEMOGLOBIN: 8.5 G/DL (ref 11.5–15.5)
LYMPHOCYTES ABSOLUTE: 0.99 E9/L (ref 1.5–4)
LYMPHOCYTES RELATIVE PERCENT: 30 % (ref 20–42)
MAGNESIUM: 1.6 MG/DL (ref 1.6–2.6)
MCH RBC QN AUTO: 31.6 PG (ref 26–35)
MCHC RBC AUTO-ENTMCNC: 34.1 % (ref 32–34.5)
MCV RBC AUTO: 92.6 FL (ref 80–99.9)
METAMYELOCYTES RELATIVE PERCENT: 1 % (ref 0–1)
MONOCYTES ABSOLUTE: 0.82 E9/L (ref 0.1–0.95)
MONOCYTES RELATIVE PERCENT: 25 % (ref 2–12)
NEUTROPHILS ABSOLUTE: 1.49 E9/L (ref 1.8–7.3)
NEUTROPHILS RELATIVE PERCENT: 44 % (ref 43–80)
NUCLEATED RED BLOOD CELLS: 2 /100 WBC
PDW BLD-RTO: 14.8 FL (ref 11.5–15)
PLATELET # BLD: 81 E9/L (ref 130–450)
PLATELET CONFIRMATION: NORMAL
PMV BLD AUTO: 10.3 FL (ref 7–12)
POTASSIUM SERPL-SCNC: 4.1 MMOL/L (ref 3.5–5)
RBC # BLD: 2.69 E12/L (ref 3.5–5.5)
RBC # BLD: NORMAL 10*6/UL
SODIUM BLD-SCNC: 140 MMOL/L (ref 132–146)
TOTAL PROTEIN: 6.1 G/DL (ref 6.4–8.3)
URIC ACID, SERUM: 4.3 MG/DL (ref 2.4–5.7)
WBC # BLD: 3.3 E9/L (ref 4.5–11.5)

## 2019-06-06 PROCEDURE — 86900 BLOOD TYPING SEROLOGIC ABO: CPT

## 2019-06-06 PROCEDURE — 99214 OFFICE O/P EST MOD 30 MIN: CPT | Performed by: INTERNAL MEDICINE

## 2019-06-06 PROCEDURE — 86850 RBC ANTIBODY SCREEN: CPT

## 2019-06-06 PROCEDURE — 99212 OFFICE O/P EST SF 10 MIN: CPT | Performed by: INTERNAL MEDICINE

## 2019-06-06 PROCEDURE — 36415 COLL VENOUS BLD VENIPUNCTURE: CPT

## 2019-06-06 PROCEDURE — 83735 ASSAY OF MAGNESIUM: CPT

## 2019-06-06 PROCEDURE — 80053 COMPREHEN METABOLIC PANEL: CPT

## 2019-06-06 PROCEDURE — 85025 COMPLETE CBC W/AUTO DIFF WBC: CPT

## 2019-06-06 PROCEDURE — 86901 BLOOD TYPING SEROLOGIC RH(D): CPT

## 2019-06-06 PROCEDURE — 84550 ASSAY OF BLOOD/URIC ACID: CPT

## 2019-06-06 NOTE — PROGRESS NOTES
induction chemotherapy with 7+3.     She was admitted from 1/21/19-02/12/2019 for standard induction with idarubicin and cytarabine on 7+3 schedule.      Her chemotherapy course overall went well,was complicated by hyperglycemia. She was admitted 02/28/2019 for influenza and pneumonia.      She had a bone marrow biopsy on 03/11/2019 confirms complete remission by histology,flow and FISH studies. She received consolidation cycle #1 given 04/22 -04/27 with modified high dose cytarabine.     Since her last visit, the patient had presented to [facility] with an altered mental status, she was found to be septic, was transferred to [facility], she had bacteremia and port infection, which has been removed, she completed the course of ceftriaxone yesterday 6/5/2019. She was seen by Dr. Sonia Garcia last week, she was told that she will receive treatment with Vidaza, rather than high-dose cytarabine. Records have been requested.     Review of Systems;  CONSTITUTIONAL: No fever, chills. Fair appetite, improved energy level. ENMT: Eyes: No diplopia; Nose: No epistaxis. Mouth: No sore throat. RESPIRATORY: No hemoptysis, shortness of breath, cough. CARDIOVASCULAR: No chest pain, palpitations. GASTROINTESTINAL: No nausea/vomiting, abdominal pain,no diarrhea, no constipation. GENITOURINARY: No dysuria, urinary frequency, hematuria. NEURO: No syncope, presyncope, headache.   Remainder:  ROS NEGATIVE     Past Medical History:  Past Medical History             Diagnosis Date    CAD (coronary artery disease)      Cancer ([address]      GERD (gastroesophageal reflux disease)      Hyperlipidemia      Hypertension      Obesity      Osteoarthritis      Type 2 diabetes mellitus without complication ([address]               Patient Active Problem List   Diagnosis    Acute leukemia not having achieved remission ([address]    Generalized weakness    Sepsis ([address]         Past Surgical History:  Past Surgical History             Procedure Allergies     Physical Exam:  BP (!) 147/88 (Site: Left Lower Arm, Position: Sitting, Cuff Size: Small Adult)   Pulse 98   Temp 98.9 °F (37.2 °C) (Oral)   Resp 20   Ht 5' 11\" (1.803 m)   BMI 43.52 kg/m²   GENERAL: Alert, oriented x 3, not in acute distress. She is in the wheelchair. HEENT: PERRLA; EOMI. Oropharynx clear. Positive for pallor. NECK: Supple. No palpable cervical or supraclavicular lymphadenopathy. LUNGS: Good air entry bilaterally. No wheezing, crackles or rhonchi. CARDIOVASCULAR: Regular rate. No murmurs, rubs or gallops. ABDOMEN: Soft. Non-tender, non-distended. Positive bowel sounds. EXTREMITIES: Bilateral lower leg edema, left greater than right, venous stasis changes. NEUROLOGIC: No focal deficits.      ECOG PS 2-3     Impression/Plan:      The patient is a pleasant 24-year-old lady with a past medical history significant for hypertension, type II diabetes mellitus, hyperlipidemia, coronary artery disease, status post stents placement, morbid obesity, who had presented to [facility] for his near syncopal episode, weakness and profound fatigue, she was found to have a white count of 31.3, hemoglobin was 5.8, platelet count 47,783, she was transferred to [facility] with concern for acute leukemia, she had a bone marrow biopsy done on 11/23/2018, 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 NGS panel- IDH1, EFUU14, CBL slice site. Due to the patient's D: Addition to this intake, requiring significant assistance with mobility without ECOG 2-3, the patient was treated with azacitidine 75 mg/m² ×7 day course between 1127 and 12/3/2018, no tumor lysis was observed, she was on allopurinol for TLS prophylaxis.        She was admitted from 1/21/19-02/12/2019 for standard induction with idarubicin and cytarabine on 7+3 schedule. Her chemotherapy course overall went well,was complicated by hyperglycemia.  She was admitted 02/28/2019

## 2019-06-10 ENCOUNTER — HOSPITAL ENCOUNTER (OUTPATIENT)
Dept: INFUSION THERAPY | Age: 64
Discharge: HOME | End: 2019-06-10
Payer: MEDICARE

## 2019-06-10 ENCOUNTER — OFFICE VISIT (OUTPATIENT)
Dept: ONCOLOGY | Age: 64
End: 2019-06-10
Payer: MEDICARE

## 2019-06-10 VITALS
HEIGHT: 71 IN | HEART RATE: 76 BPM | WEIGHT: 293 LBS | RESPIRATION RATE: 20 BRPM | SYSTOLIC BLOOD PRESSURE: 131 MMHG | BODY MASS INDEX: 41.02 KG/M2 | TEMPERATURE: 98.4 F | DIASTOLIC BLOOD PRESSURE: 74 MMHG

## 2019-06-10 DIAGNOSIS — L24.A9 WOUND DRAINAGE: Primary | ICD-10-CM

## 2019-06-10 DIAGNOSIS — C95.00 ACUTE LEUKEMIA NOT HAVING ACHIEVED REMISSION (HCC): ICD-10-CM

## 2019-06-10 DIAGNOSIS — C92.00 ACUTE MYELOID LEUKEMIA NOT HAVING ACHIEVED REMISSION (HCC): ICD-10-CM

## 2019-06-10 LAB
ABO/RH: NORMAL
ALBUMIN SERPL-MCNC: 3.3 G/DL (ref 3.5–5.2)
ALP BLD-CCNC: 112 U/L (ref 35–104)
ALT SERPL-CCNC: 13 U/L (ref 0–32)
ANION GAP SERPL CALCULATED.3IONS-SCNC: 13 MMOL/L (ref 7–16)
ANTIBODY SCREEN: NORMAL
AST SERPL-CCNC: 15 U/L (ref 0–31)
BASOPHILS ABSOLUTE: 0.01 E9/L (ref 0–0.2)
BASOPHILS RELATIVE PERCENT: 0.3 % (ref 0–2)
BILIRUB SERPL-MCNC: 0.3 MG/DL (ref 0–1.2)
BUN BLDV-MCNC: 14 MG/DL (ref 8–23)
CALCIUM SERPL-MCNC: 9.3 MG/DL (ref 8.6–10.2)
CHLORIDE BLD-SCNC: 101 MMOL/L (ref 98–107)
CO2: 28 MMOL/L (ref 22–29)
CREAT SERPL-MCNC: 0.9 MG/DL (ref 0.5–1)
EOSINOPHILS ABSOLUTE: 0.08 E9/L (ref 0.05–0.5)
EOSINOPHILS RELATIVE PERCENT: 2.3 % (ref 0–6)
GFR AFRICAN AMERICAN: >60
GFR NON-AFRICAN AMERICAN: >60 ML/MIN/1.73
GLUCOSE BLD-MCNC: 291 MG/DL (ref 74–99)
HCT VFR BLD CALC: 27.6 % (ref 34–48)
HEMOGLOBIN: 9.1 G/DL (ref 11.5–15.5)
IMMATURE GRANULOCYTES #: 0.01 E9/L
IMMATURE GRANULOCYTES %: 0.3 % (ref 0–5)
LYMPHOCYTES ABSOLUTE: 0.99 E9/L (ref 1.5–4)
LYMPHOCYTES RELATIVE PERCENT: 28 % (ref 20–42)
MAGNESIUM: 1.6 MG/DL (ref 1.6–2.6)
MCH RBC QN AUTO: 31.6 PG (ref 26–35)
MCHC RBC AUTO-ENTMCNC: 33 % (ref 32–34.5)
MCV RBC AUTO: 95.8 FL (ref 80–99.9)
MONOCYTES ABSOLUTE: 0.76 E9/L (ref 0.1–0.95)
MONOCYTES RELATIVE PERCENT: 21.5 % (ref 2–12)
NEUTROPHILS ABSOLUTE: 1.68 E9/L (ref 1.8–7.3)
NEUTROPHILS RELATIVE PERCENT: 47.6 % (ref 43–80)
PDW BLD-RTO: 18.6 FL (ref 11.5–15)
PLATELET # BLD: 121 E9/L (ref 130–450)
PMV BLD AUTO: 10.3 FL (ref 7–12)
POTASSIUM SERPL-SCNC: 4.1 MMOL/L (ref 3.5–5)
RBC # BLD: 2.88 E12/L (ref 3.5–5.5)
SODIUM BLD-SCNC: 142 MMOL/L (ref 132–146)
TOTAL PROTEIN: 6.4 G/DL (ref 6.4–8.3)
URIC ACID, SERUM: 5.4 MG/DL (ref 2.4–5.7)
WBC # BLD: 3.5 E9/L (ref 4.5–11.5)

## 2019-06-10 PROCEDURE — 84550 ASSAY OF BLOOD/URIC ACID: CPT

## 2019-06-10 PROCEDURE — 83735 ASSAY OF MAGNESIUM: CPT

## 2019-06-10 PROCEDURE — 85025 COMPLETE CBC W/AUTO DIFF WBC: CPT

## 2019-06-10 PROCEDURE — 86850 RBC ANTIBODY SCREEN: CPT

## 2019-06-10 PROCEDURE — 86900 BLOOD TYPING SEROLOGIC ABO: CPT

## 2019-06-10 PROCEDURE — 36415 COLL VENOUS BLD VENIPUNCTURE: CPT

## 2019-06-10 PROCEDURE — 99214 OFFICE O/P EST MOD 30 MIN: CPT | Performed by: INTERNAL MEDICINE

## 2019-06-10 PROCEDURE — 80053 COMPREHEN METABOLIC PANEL: CPT

## 2019-06-10 PROCEDURE — 99213 OFFICE O/P EST LOW 20 MIN: CPT

## 2019-06-10 PROCEDURE — 86901 BLOOD TYPING SEROLOGIC RH(D): CPT

## 2019-06-10 NOTE — PROGRESS NOTES
[address] [facility] [address] [address]  Dept: [phone]  Loc: [phone]  Attending progress Note       Reason for Visit:   AML.    Referring Physician: Dr. Yobani Flood (C/Wali Anaya).     PCP:  Leonidas Benjamin MD     History of Present Illness:      The patient is a pleasant 59-year-old lady with a past medical history significant for hypertension, type II diabetes mellitus, hyperlipidemia, coronary artery disease, status post stents placement, morbid obesity, who had presented to [facility] for his near syncopal episode, weakness and profound fatigue, she was found to have a white count of 31.3, hemoglobin was 5.8, platelet count 97,636, she was transferred to [facility] with concern for acute leukemia, she had a bone marrow biopsy done on 11/23/2018, 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 NGS panel- IDH1, [id number], CBL slice site. Due to the patient's D: Addition to this intake, requiring significant assistance with mobility without ECOG 2-3, the patient was treated with azacitidine 75 mg/m² ×7 day course between 1127 and 12/3/2018, no tumor lysis was observed, she was on allopurinol for TLS prophylaxis, and acyclovir the year and the voriconazole for infection prophylaxis while inpatient, she was discharged on acyclovir, fluconazole and ciprofloxacin, which she has been taking. The patient is feeling tired, no chest pain or shortness of breath, no bleeding. She has lower leg edema, she had venous ultrasounds done they were negative for DVT, she also has history of lower extremities ulcers, she was referred to the wound clinic at [facility].      The patient saw Dr. Haily Tee at [facility] on 12/31/2018, and had a BM bx done on 1/3.   She saw Dr. Smita Khan on 1/10, the blasts in the bone marrow had decreased to 4%, she will be seen again at [facility] on Thursday, plan for admission on Monday, 1/21/2019 for Sepsis ([facility] [address]         Past Surgical History:  Past Surgical History             Procedure Laterality Date    CARDIAC CATHETERIZATION        CHOLECYSTECTOMY        DILATION AND CURETTAGE OF UTERUS        FOOT SURGERY Left              Family History:  Family History         Family History   Problem Relation Age of Onset    Coronary Art Dis Mother      Stroke Mother      Diabetes Mother      Coronary Art Dis Father      Diabetes Father      Diabetes Sister      Cancer Paternal Aunt           Bone            Medications:  Reviewed and reconciled.     Social History:  Social History               Socioeconomic History    Marital status:        Spouse name: Not on file    Number of children: Not on file    Years of education: Not on file    Highest education level: Not on file   Occupational History    Not on file   Social Needs    Financial resource strain: Not on file    Food insecurity:       Worry: Not on file       Inability: Not on file    Transportation needs:       Medical: Not on file       Non-medical: Not on file   Tobacco Use    Smoking status: Never Smoker    Smokeless tobacco: Never Used   Substance and Sexual Activity    Alcohol use: No    Drug use: No    Sexual activity: Not Currently       Partners: Male   Lifestyle    Physical activity:       Days per week: Not on file       Minutes per session: Not on file    Stress: Not on file   Relationships    Social connections:       Talks on phone: Not on file       Gets together: Not on file       Attends Christian service: Not on file       Active member of club or organization: Not on file       Attends meetings of clubs or organizations: Not on file       Relationship status: Not on file    Intimate partner violence:       Fear of current or ex partner: Not on file       Emotionally abused: Not on file       Physically abused: Not on file       Forced sexual activity: Not on file   Other Topics Concern    Not on file by hyperglycemia. She was admitted 02/28/2019 for influenza and pneumonia. She had a bone marrow biopsy on 03/11/2019 confirms complete remission by histology,flow and FISH studies. She received consolidation cycle #1 given 04/22 -04/27 with modified high dose cytarabine.     The patient had presented to [facility] with an altered mental status, she was found to be septic, was transferred to [facility], she had bacteremia and post infection, which has been removed, she completed yesterday 6/5/2019 the course of ceftriaxone. She was seen by Dr. Leila Fisher, she will receive Vidaza rather than high dose ranulfo-C due to the severe myelosuppression she had with the last treatment. Labs reviewed, white count is 3.5, ANC is 1680, hemoglobin is 9.1, hematocrit 7.6, platelet count 81,620, had overall improved. No need for blood products transfusion today. Records have been requested from [facility]. Patient prefers to receive the Vidaza locally, await records from Dr. Armani Cooley office. Referred patient to [address] [place] [address] for symptoms management. The patient will be referred to the wound clinic. RTC in 1 week.     Edwardo Ferrari MD   HEMATOLOGY/MEDICAL [facility] [address]  [facility] [address] [address]  Dept: Eileen: [phone]

## 2019-06-13 ENCOUNTER — HOSPITAL ENCOUNTER (OUTPATIENT)
Age: 64
Discharge: HOME | End: 2019-06-15
Payer: MEDICARE

## 2019-06-13 ENCOUNTER — OFFICE VISIT (OUTPATIENT)
Dept: PALLATIVE CARE | Age: 64
End: 2019-06-13
Payer: MEDICARE

## 2019-06-13 VITALS
WEIGHT: 293 LBS | BODY MASS INDEX: 41.56 KG/M2 | HEART RATE: 72 BPM | SYSTOLIC BLOOD PRESSURE: 138 MMHG | DIASTOLIC BLOOD PRESSURE: 58 MMHG | OXYGEN SATURATION: 98 %

## 2019-06-13 DIAGNOSIS — R19.7 DIARRHEA, UNSPECIFIED TYPE: ICD-10-CM

## 2019-06-13 DIAGNOSIS — C95.00 ACUTE LEUKEMIA NOT HAVING ACHIEVED REMISSION (HCC): Primary | ICD-10-CM

## 2019-06-13 DIAGNOSIS — G62.9 POLYNEUROPATHY: ICD-10-CM

## 2019-06-13 DIAGNOSIS — G47.00 INSOMNIA, UNSPECIFIED TYPE: ICD-10-CM

## 2019-06-13 DIAGNOSIS — R53.83 FATIGUE, UNSPECIFIED TYPE: ICD-10-CM

## 2019-06-13 DIAGNOSIS — Z51.5 PALLIATIVE CARE BY SPECIALIST: ICD-10-CM

## 2019-06-13 PROCEDURE — G0480 DRUG TEST DEF 1-7 CLASSES: HCPCS

## 2019-06-13 PROCEDURE — 80307 DRUG TEST PRSMV CHEM ANLYZR: CPT

## 2019-06-13 PROCEDURE — 99204 OFFICE O/P NEW MOD 45 MIN: CPT

## 2019-06-13 PROCEDURE — 99205 OFFICE O/P NEW HI 60 MIN: CPT | Performed by: FAMILY MEDICINE

## 2019-06-13 PROCEDURE — 99204 OFFICE O/P NEW MOD 45 MIN: CPT | Performed by: FAMILY MEDICINE

## 2019-06-13 RX ORDER — OXYCODONE AND ACETAMINOPHEN 7.5; 325 MG/1; MG/1
1 TABLET ORAL EVERY 6 HOURS PRN
Qty: 90 TABLET | Refills: 0 | Status: SHIPPED | OUTPATIENT
Start: 2019-06-13 | End: 2019-07-02 | Stop reason: ALTCHOICE

## 2019-06-13 RX ORDER — GABAPENTIN 300 MG/1
300 CAPSULE ORAL 3 TIMES DAILY
Qty: 90 CAPSULE | Refills: 0 | Status: SHIPPED | OUTPATIENT
Start: 2019-06-13 | End: 2019-07-02 | Stop reason: SDUPTHER

## 2019-06-13 NOTE — PROGRESS NOTES
Palliative Medicine Outpatient Visit  2019    Provider: Maryjane Bowling MD        Referring Provider: Dr Nelly Juares    Reason for Consult:  [] [address]  [x] Assist with goals of care  [] Transition of care  [x] Symptom Management    See below for recommendations, as indicated, concernin. Advanced Care Planning  2. Anticipatory Guidance  3. Transition of Care  4. Symptom Management      CC: Neuropathy, fatigue, diarrhea, nausea, insomnia    HPI:  As per medical oncology assessment from Alanis 10, 2019:  The patient is a pleasant 61-year-old lady with a past medical history significant for hypertension, type II diabetes mellitus, hyperlipidemia, coronary artery disease, status post stents placement, morbid obesity, who had presented to [facility] for his near syncopal episode, weakness and profound fatigue, she was found to have a white count of 31.3, hemoglobin was 5.8, platelet count 90,738, she was transferred to [facility] with concern for acute leukemia, she had a bone marrow biopsy done on 2018, 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 NGS panel- IDH1, [id number], CBL slice site. Due to the patient's D: Addition to this intake, requiring significant assistance with mobility without ECOG 2-3, the patient was treated with azacitidine 75 mg/m² ×7 day course between  and 12/3/2018, no tumor lysis was observed, she was on allopurinol for TLS prophylaxis. She was admitted from  for standard induction with idarubicin and cytarabine on 7+3 schedule.   Her chemotherapy course overall went well,was complicated by hyperglycemia. She was admitted 2019 for influenza and pneumonia. She had a bone marrow biopsy on 2019 confirms complete remission by histology,flow and FISH studies.    She received consolidation cycle #1 given  - with modified high dose cytarabine.   The patient had presented to [facility] with an altered mental status, she was found to be septic, was transferred to [facility], she had bacteremia and post infection, which has been removed, she completed yesterday 6/5/2019 the course of ceftriaxone. She was seen by Dr. Sonia Garcia, she will receive Vidaza rather than high dose ranulfo-C due to the severe myelosuppression she had with the last treatment. Labs reviewed, white count is 3.5, ANC is 1680, hemoglobin is 9.1, hematocrit 7.6, platelet count 65,268, had overall improved. No need for blood products transfusion today. Records have been requested from [facility]. Patient prefers to receive the Vidaza locally, await records from Dr. Danielle Rincon office. Referred patient to [address] [place] [address] for symptoms management. The patient will be referred to the wound clinic. RTC in 1 week.     Past Medical History:   Diagnosis Date    CAD (coronary artery disease)     Cancer ([facility] [address]     GERD (gastroesophageal reflux disease)     Hyperlipidemia     Hypertension     Obesity     Osteoarthritis     Type 2 diabetes mellitus without complication ([facility] [address]        Past Surgical History:   Procedure Laterality Date    CARDIAC CATHETERIZATION      CHOLECYSTECTOMY      DILATION AND CURETTAGE OF UTERUS      FOOT SURGERY Left        Current Outpatient Medications on File Prior to Visit   Medication Sig Dispense Refill    Loratadine (CLARITIN PO) Take 10 mg by mouth      DULoxetine (CYMBALTA) 30 MG extended release capsule Take 30 mg by mouth daily      Lidocaine (LIDODERM EX) Apply topically      prednisoLONE acetate (PRED FORTE) 1 % ophthalmic suspension 1 drop 4 times daily      insulin lispro (HUMALOG) 100 UNIT/ML injection vial Inject 0-12 Units into the skin 3 times daily (with meals) 1 vial 3    insulin lispro (HUMALOG) 100 UNIT/ML injection vial Inject 0-6 Units into the skin nightly 1 vial 3    diclofenac sodium 1 % GEL Apply 2 g topically 4 times daily 1 Tube 3    aspirin 81 MG tablet Take 81 mg by mouth daily      docusate sodium (COLACE) 100 MG capsule Take 100 mg by mouth daily      magnesium oxide (MAG-OX) 400 (240 Mg) MG tablet Take 1 tablet by mouth 2 times daily (Patient taking differently: Take 400 mg by mouth 3 times daily ) 60 tablet 3    Insulin Degludec (TRESIBA FLEXTOUCH) 100 UNIT/ML SOPN Inject 35 Units into the skin nightly      fluconazole (DIFLUCAN) 200 MG tablet Take 400 mg by mouth daily       acyclovir (ZOVIRAX) 400 MG tablet Take 400 mg by mouth 2 times daily      pantoprazole (PROTONIX) 40 MG tablet Take 40 mg by mouth daily      metoprolol (LOPRESSOR) 100 MG tablet Take 100 mg by mouth 2 times daily      atorvastatin (LIPITOR) 80 MG tablet Take 80 mg by mouth daily      metFORMIN (GLUCOPHAGE) 1000 MG tablet Take 1,000 mg by mouth 2 times daily (with meals)      isosorbide mononitrate (IMDUR) 30 MG extended release tablet Take 30 mg by mouth daily      losartan (COZAAR) 100 MG tablet Take 100 mg by mouth daily      ondansetron (ZOFRAN) 8 MG tablet Take 8 mg by mouth every 8 hours as needed for Nausea or Vomiting      oxyCODONE HCl 7.5 MG TABA Take 7.5 mg by mouth every 6 hours as needed for Pain. No current facility-administered medications on file prior to visit. Allergies:    Patient has no known allergies. ROS: UNLESS STATED ABOVE PATIENT DENIES:  CONSTITUTIONAL:  fever, chill, rigors, nausea, vomiting, fatigue. HEENT: blurry vision, double vision, hearing problem, tinnitus, hoarseness, dysphagia,               odynophagia  RESPIRATORY: cough, shortness of breath, sputum expectoration. CARDIOVASCULAR:  Chest pain/pressure, palpitation, syncope, irregular beats  GASTROINTESTINAL:  abdominal or rectal pain, diarrhea, constipation, . GENITOURINARY:  Burning, frequency, urgency, incontinence, discharge  INTEGUMENTARY: rash, wound, pruritis  HEMATOLOGIC/LYMPHATIC:  Swelling, sores, gum bleeding, easy bruising, pica.   MUSCULOSKELETAL:  pain, edema, joint normocephalic, atraumatic, sclera nonicteric, PERRLA, EOMI, MMM, good speech tone and quality  Neck: no LAD, no JVD, supple, no masses, normal speech, trachea midline,   Lungs: bilaterally clear to auscultation, good aeration, symmetric  Heart: regular rate and rhythm, no murmurs/rubs/gallops/ectopy appreciated, PMI WNL  Abd.: non-distended, soft, nontender, non-distended, normal bowel sounds  Ext.: no clubbing, cyanosis or edema, no joint tenderness  Skin: warm, adequate hydration without acute lesions  Neuro: awake, alert, oriented x 3, conversive,     Impression:  As per medical oncology assessment from Alanis 10, 2019:  The patient is a pleasant 68-year-old lady with a past medical history significant for hypertension, type II diabetes mellitus, hyperlipidemia, coronary artery disease, status post stents placement, morbid obesity, who had presented to [facility] for his near syncopal episode, weakness and profound fatigue, she was found to have a white count of 31.3, hemoglobin was 5.8, platelet count 38,886, she was transferred to [facility] with concern for acute leukemia, she had a bone marrow biopsy done on 11/23/2018, 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 NGS panel- IDH1, [id number], CBL slice site. Due to the patient's D: Addition to this intake, requiring significant assistance with mobility without ECOG 2-3, the patient was treated with azacitidine 75 mg/m² ×7 day course between 1127 and 12/3/2018, no tumor lysis was observed, she was on allopurinol for TLS prophylaxis. She was admitted from 1/21/19-02/12/2019 for standard induction with idarubicin and cytarabine on 7+3 schedule.   Her chemotherapy course overall went well,was complicated by hyperglycemia. She was admitted 02/28/2019 for influenza and pneumonia.    She had a bone marrow biopsy on 03/11/2019 confirms complete remission by histology,flow and FISH studies.   Mckenna Hernandez received consolidation cycle #1 given 04/22 -04/27 with modified high dose cytarabine. The patient had presented to [facility] with an altered mental status, she was found to be septic, was transferred to [facility], she had bacteremia and post infection, which has been removed, she completed yesterday 6/5/2019 the course of ceftriaxone. She was seen by Dr. Jaylin Jacobs, she will receive Vidaza rather than high dose ranulfo-C due to the severe myelosuppression she had with the last treatment. Labs reviewed, white count is 3.5, ANC is 1680, hemoglobin is 9.1, hematocrit 7.6, platelet count 99,412, had overall improved. No need for blood products transfusion today. Records have been requested from [address] [place] [address]. Patient prefers to receive the Vidaza locally, await records from Dr. Dalia Araya office. Referred patient to [address] [place] [address] for symptoms management. The patient will be referred to the wound clinic. RTC in 1 week. June 13, 2019:  Medical records reviewed and patient presents in a wheelchair accompanied by her  in no acute distress without sign of sedation and/or confusion, pleasant able to voice her needs. Patient introduced to palliative care, signed an opiate agreement and we are sending a UDS. Patient adamantly denies drug use. Patient states that her #1 symptom is pain to her arms, legs and joints, hands describing neuropathy and also weakness. Patient states that she has tried Tylenol without avail, Cymbalta made her \"feel spacey and have weird dreams\". Patient from [facility] prescribed Percocet 7.5-325 mg tablets that she states works well if she takes 3 times daily. Patient denies oversedation from this. Patient states she has not taken anything else for her pain. Patient states that fatigue is an issue, not sleeping well at night and also very infrequent nausea in which she has Zofran that works well at home.   Patient also has loose pasty bowel movements approximately 4 times daily not taking anything for it.  Options were discussed and again we are sending a UDS. Today we prescribed unchanged Percocet 7.5-325 mg tablets 1 p.o. every 6 hours as needed prescribing 90 tablets that she takes them 3 times daily. We also educated her and initiated Neurontin 300 mg initially nightly x5 days then twice daily x5 days then 3 times daily. Patient advised that if she experiences sedation from the medication to call us immediately and this may inhibit the titration accordingly. Cymbalta should not be used secondary to voiced side effects as above. Other options may be Pamelor or methadone once gabapentin is titrated unless contraindicated. I also advised patient to use Imodium as needed for her symptoms of diarrhea. Patient will continue Zofran for her infrequent nausea. We will see her back in 4 weeks or sooner if she needs us. Controlled Substance Monitoring:  Acute and Chronic Pain Monitoring:   RX Monitoring 6/13/2019   Periodic Controlled Substance Monitoring Possible medication side effects, risk of tolerance/dependence & alternative treatments discussed. ;No signs of potential drug abuse or diversion identified. ;Assessed functional status. ;Obtaining appropriate analgesic effect of treatment. ;Random urine drug screen sent today. Chronic Pain > 50 MEDD Re-evaluated the status of the patient's underlying condition causing pain.;Obtained or confirmed \"Consent for Opioid Use\" on file.        Time in minutes:    [] 15   [] 30   [] 45   [x] 60   [] 75   [] 90  Chart review/documentation:  [x] 15   [] 30   [] 45   [] 60   [] 75   [] 90  Assessment:     [x] 15   [] 30   [] 45   [] 60   [] 75   [] 90  Conversation patient/family/staff: [] 15   [x] 30   [] 45   [] 60   [] 75   [] 90    Karyle Reeds MD    6/13/2019

## 2019-06-17 ENCOUNTER — HOSPITAL ENCOUNTER (OUTPATIENT)
Dept: INFUSION THERAPY | Age: 64
Discharge: HOME | End: 2019-06-17
Payer: MEDICARE

## 2019-06-17 DIAGNOSIS — C92.00 ACUTE MYELOID LEUKEMIA NOT HAVING ACHIEVED REMISSION (HCC): ICD-10-CM

## 2019-06-19 ENCOUNTER — HOSPITAL ENCOUNTER (OUTPATIENT)
Dept: INFUSION THERAPY | Age: 64
Discharge: HOME | End: 2019-06-19
Payer: MEDICARE

## 2019-06-19 ENCOUNTER — OFFICE VISIT (OUTPATIENT)
Dept: ONCOLOGY | Age: 64
End: 2019-06-19
Payer: MEDICARE

## 2019-06-19 VITALS
HEIGHT: 71 IN | SYSTOLIC BLOOD PRESSURE: 160 MMHG | HEART RATE: 76 BPM | DIASTOLIC BLOOD PRESSURE: 70 MMHG | TEMPERATURE: 98.2 F | WEIGHT: 293 LBS | BODY MASS INDEX: 41.02 KG/M2

## 2019-06-19 DIAGNOSIS — R60.0 LOWER LEG EDEMA: Primary | ICD-10-CM

## 2019-06-19 DIAGNOSIS — C92.00 ACUTE MYELOID LEUKEMIA NOT HAVING ACHIEVED REMISSION (HCC): ICD-10-CM

## 2019-06-19 LAB
ABO/RH: NORMAL
ALBUMIN SERPL-MCNC: 3.5 G/DL (ref 3.5–5.2)
ALP BLD-CCNC: 104 U/L (ref 35–104)
ALT SERPL-CCNC: 12 U/L (ref 0–32)
ANION GAP SERPL CALCULATED.3IONS-SCNC: 14 MMOL/L (ref 7–16)
ANISOCYTOSIS: ABNORMAL
ANTIBODY SCREEN: NORMAL
AST SERPL-CCNC: 14 U/L (ref 0–31)
BASOPHILS ABSOLUTE: 0.03 E9/L (ref 0–0.2)
BASOPHILS RELATIVE PERCENT: 0.6 % (ref 0–2)
BILIRUB SERPL-MCNC: 0.3 MG/DL (ref 0–1.2)
BUN BLDV-MCNC: 23 MG/DL (ref 8–23)
CALCIUM SERPL-MCNC: 9.5 MG/DL (ref 8.6–10.2)
CHLORIDE BLD-SCNC: 101 MMOL/L (ref 98–107)
CO2: 26 MMOL/L (ref 22–29)
CREAT SERPL-MCNC: 0.9 MG/DL (ref 0.5–1)
EOSINOPHILS ABSOLUTE: 0.29 E9/L (ref 0.05–0.5)
EOSINOPHILS RELATIVE PERCENT: 6.2 % (ref 0–6)
GFR AFRICAN AMERICAN: >60
GFR NON-AFRICAN AMERICAN: >60 ML/MIN/1.73
GLUCOSE BLD-MCNC: 265 MG/DL (ref 74–99)
HCT VFR BLD CALC: 28.5 % (ref 34–48)
HEMOGLOBIN: 9.4 G/DL (ref 11.5–15.5)
IMMATURE GRANULOCYTES #: 0.02 E9/L
IMMATURE GRANULOCYTES %: 0.4 % (ref 0–5)
LYMPHOCYTES ABSOLUTE: 1.03 E9/L (ref 1.5–4)
LYMPHOCYTES RELATIVE PERCENT: 22.1 % (ref 20–42)
MAGNESIUM: 1.3 MG/DL (ref 1.6–2.6)
MCH RBC QN AUTO: 33.5 PG (ref 26–35)
MCHC RBC AUTO-ENTMCNC: 33 % (ref 32–34.5)
MCV RBC AUTO: 101.4 FL (ref 80–99.9)
MONOCYTES ABSOLUTE: 0.66 E9/L (ref 0.1–0.95)
MONOCYTES RELATIVE PERCENT: 14.1 % (ref 2–12)
NEUTROPHILS ABSOLUTE: 2.64 E9/L (ref 1.8–7.3)
NEUTROPHILS RELATIVE PERCENT: 56.6 % (ref 43–80)
PDW BLD-RTO: 21.2 FL (ref 11.5–15)
PLATELET # BLD: 118 E9/L (ref 130–450)
PMV BLD AUTO: 10.4 FL (ref 7–12)
POLYCHROMASIA: ABNORMAL
POTASSIUM SERPL-SCNC: 4.1 MMOL/L (ref 3.5–5)
RBC # BLD: 2.81 E12/L (ref 3.5–5.5)
SODIUM BLD-SCNC: 141 MMOL/L (ref 132–146)
TOTAL PROTEIN: 6.2 G/DL (ref 6.4–8.3)
URIC ACID, SERUM: 5.9 MG/DL (ref 2.4–5.7)
WBC # BLD: 4.7 E9/L (ref 4.5–11.5)

## 2019-06-19 PROCEDURE — 85025 COMPLETE CBC W/AUTO DIFF WBC: CPT

## 2019-06-19 PROCEDURE — 99214 OFFICE O/P EST MOD 30 MIN: CPT | Performed by: INTERNAL MEDICINE

## 2019-06-19 PROCEDURE — 84550 ASSAY OF BLOOD/URIC ACID: CPT

## 2019-06-19 PROCEDURE — 86901 BLOOD TYPING SEROLOGIC RH(D): CPT

## 2019-06-19 PROCEDURE — 83735 ASSAY OF MAGNESIUM: CPT

## 2019-06-19 PROCEDURE — 80053 COMPREHEN METABOLIC PANEL: CPT

## 2019-06-19 PROCEDURE — 86900 BLOOD TYPING SEROLOGIC ABO: CPT

## 2019-06-19 PROCEDURE — 99213 OFFICE O/P EST LOW 20 MIN: CPT

## 2019-06-19 PROCEDURE — 36415 COLL VENOUS BLD VENIPUNCTURE: CPT

## 2019-06-19 PROCEDURE — 86850 RBC ANTIBODY SCREEN: CPT

## 2019-06-19 RX ORDER — INSULIN ASPART 100 [IU]/ML
INJECTION, SOLUTION INTRAVENOUS; SUBCUTANEOUS
Refills: 0 | COMMUNITY
Start: 2019-06-17 | End: 2019-07-11 | Stop reason: ALTCHOICE

## 2019-06-19 RX ORDER — MAGNESIUM SULFATE IN WATER 40 MG/ML
2 INJECTION, SOLUTION INTRAVENOUS ONCE
Status: CANCELLED
Start: 2019-06-20

## 2019-06-19 RX ORDER — INSULIN GLARGINE 100 [IU]/ML
INJECTION, SOLUTION SUBCUTANEOUS
Refills: 0 | COMMUNITY
Start: 2019-06-17 | End: 2019-07-11 | Stop reason: ALTCHOICE

## 2019-06-19 RX ORDER — MAGNESIUM SULFATE IN WATER 40 MG/ML
2 INJECTION, SOLUTION INTRAVENOUS ONCE
Status: CANCELLED
Start: 2019-06-21

## 2019-06-19 NOTE — PROGRESS NOTES
[address] [facility] [address] [id number]  Dept: [phone]  Loc: [phone]  Attending progress Note       Reason for Visit:   AML.    Referring Physician: Dr. Lima Angulo (C/Wali Anaya).     PCP:  Susy Payne MD     History of Present Illness:      The patient is a pleasant 69-year-old lady with a past medical history significant for hypertension, type II diabetes mellitus, hyperlipidemia, coronary artery disease, status post stents placement, morbid obesity, who had presented to [facility] for his near syncopal episode, weakness and profound fatigue, she was found to have a white count of 31.3, hemoglobin was 5.8, platelet count 00,096, she was transferred to [facility] with concern for acute leukemia, she had a bone marrow biopsy done on 11/23/2018, 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 NGS panel- IDH1, [id number], CBL slice site. Due to the patient's D: Addition to this intake, requiring significant assistance with mobility without ECOG 2-3, the patient was treated with azacitidine 75 mg/m² ×7 day course between 1127 and 12/3/2018, no tumor lysis was observed, she was on allopurinol for TLS prophylaxis, and acyclovir the year and the voriconazole for infection prophylaxis while inpatient, she was discharged on acyclovir, fluconazole and ciprofloxacin, which she has been taking. The patient is feeling tired, no chest pain or shortness of breath, no bleeding. She has lower leg edema, she had venous ultrasounds done they were negative for DVT, she also has history of lower extremities ulcers, she was referred to the wound clinic at [facility].      The patient saw Dr. Vivian Murillo at [facility] on 12/31/2018, and had a BM bx done on 1/3.   She saw Dr. Amari Alberts on 1/10, the blasts in the bone marrow had decreased to 4%, she will be seen again at [facility] on Thursday, plan for admission on Monday, 1/21/2019 for    Past Surgical History:  Past Surgical History             Procedure Laterality Date    CARDIAC CATHETERIZATION        CHOLECYSTECTOMY        DILATION AND CURETTAGE OF UTERUS        FOOT SURGERY Left              Family History:  Family History         Family History   Problem Relation Age of Onset    Coronary Art Dis Mother      Stroke Mother      Diabetes Mother      Coronary Art Dis Father      Diabetes Father      Diabetes Sister      Cancer Paternal Aunt           Bone            Medications:  Reviewed and reconciled.     Social History:  Social History               Socioeconomic History    Marital status:        Spouse name: Not on file    Number of children: Not on file    Years of education: Not on file    Highest education level: Not on file   Occupational History    Not on file   Social Needs    Financial resource strain: Not on file    Food insecurity:       Worry: Not on file       Inability: Not on file    Transportation needs:       Medical: Not on file       Non-medical: Not on file   Tobacco Use    Smoking status: Never Smoker    Smokeless tobacco: Never Used   Substance and Sexual Activity    Alcohol use: No    Drug use: No    Sexual activity: Not Currently       Partners: Male   Lifestyle    Physical activity:       Days per week: Not on file       Minutes per session: Not on file    Stress: Not on file   Relationships    Social connections:       Talks on phone: Not on file       Gets together: Not on file       Attends [employer] service: Not on file       Active member of club or organization: Not on file       Attends meetings of clubs or organizations: Not on file       Relationship status: Not on file    Intimate partner violence:       Fear of current or ex partner: Not on file       Emotionally abused: Not on file       Physically abused: Not on file       Forced sexual activity: Not on file   Other Topics Concern    Not on file   Social History Narrative    Not on file            Allergies:  No Known Allergies     Physical Exam:  BP (!) 147/88 (Site: Left Lower Arm, Position: Sitting, Cuff Size: Small Adult)   Pulse 98   Temp 98.9 °F (37.2 °C) (Oral)   Resp 20   Ht 5' 11\" (1.803 m)   BMI 43.52 kg/m²   GENERAL: Alert, oriented x 3, not in acute distress. She is in the wheelchair. HEENT: PERRLA; EOMI. Oropharynx clear. Positive for pallor. NECK: Supple. No palpable cervical or supraclavicular lymphadenopathy. LUNGS: Good air entry bilaterally. No wheezing, crackles or rhonchi. CARDIOVASCULAR: Regular rate. No murmurs, rubs or gallops. ABDOMEN: Soft. Non-tender, non-distended. Positive bowel sounds. EXTREMITIES: Lateral lower leg edema, tenderness of the left calf, venous stasis changes. NEUROLOGIC: No focal deficits.      ECOG PS 2-3     Impression/Plan:      The patient is a pleasant 43-year-old lady with a past medical history significant for hypertension, type II diabetes mellitus, hyperlipidemia, coronary artery disease, status post stents placement, morbid obesity, who had presented to [facility] for his near syncopal episode, weakness and profound fatigue, she was found to have a white count of 31.3, hemoglobin was 5.8, platelet count 18,072, she was transferred to [facility] with concern for acute leukemia, she had a bone marrow biopsy done on 11/23/2018, 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 NGS panel- IDH1, LWOE23, CBL slice site. Due to the patient's D: Addition to this intake, requiring significant assistance with mobility without ECOG 2-3, the patient was treated with azacitidine 75 mg/m² ×7 day course between 1127 and 12/3/2018, no tumor lysis was observed, she was on allopurinol for TLS prophylaxis.        She was admitted from 1/21/19-02/12/2019 for standard induction with idarubicin and cytarabine on 7+3 schedule.    Her chemotherapy course overall went well,was complicated by hyperglycemia. She was admitted 02/28/2019 for influenza and pneumonia. She had a bone marrow biopsy on 03/11/2019 confirms complete remission by histology,flow and FISH studies. She received consolidation cycle #1 given 04/22 -04/27 with modified high dose cytarabine.     The patient had presented to [facility] with an altered mental status, she was found to be septic, was transferred to [facility], she had bacteremia and post infection, which has been removed, she completed yesterday 6/5/2019 the course of ceftriaxone. She was seen by Dr. Naida Stewart, she will receive Vidaza rather than high dose ranulfo-C due to the severe myelosuppression she had with the last treatment. Labs reviewed, white count is 4.7, ANC is 2640, hemoglobin is told for, hematocrit 8.5, platelet count 134R, had overall improved. No need for blood products transfusion today. Hypomagnesemia, Mg is 1.3, she will receive intravenous magnesium sulfate    Records have been requested from [facility] times. Patient prefers to receive the Vidaza locally, cold to Dr. Kendra Oneal office. Lower leg edema, ordered bilateral venous ultrasound, the patient is unable to have it done today, to be scheduled for tomorrow. Referred patient to [address] [place] [address] for symptoms management. RTC in 1 week.     Aden Daily MD   HEMATOLOGY/MEDICAL [address] [id number]  [facility]: [facility]: [phone]

## 2019-06-20 ENCOUNTER — HOSPITAL ENCOUNTER (OUTPATIENT)
Dept: INFUSION THERAPY | Age: 64
Discharge: HOME | End: 2019-06-20
Payer: MEDICARE

## 2019-06-20 VITALS — SYSTOLIC BLOOD PRESSURE: 137 MMHG | RESPIRATION RATE: 20 BRPM | DIASTOLIC BLOOD PRESSURE: 66 MMHG | HEART RATE: 79 BPM

## 2019-06-20 DIAGNOSIS — E83.42 HYPOMAGNESEMIA: Primary | ICD-10-CM

## 2019-06-20 DIAGNOSIS — C95.00 ACUTE LEUKEMIA NOT HAVING ACHIEVED REMISSION (HCC): ICD-10-CM

## 2019-06-20 PROCEDURE — 96365 THER/PROPH/DIAG IV INF INIT: CPT

## 2019-06-20 PROCEDURE — 2580000003 HC RX 258: Performed by: INTERNAL MEDICINE

## 2019-06-20 PROCEDURE — 6360000002 HC RX W HCPCS: Performed by: NURSE PRACTITIONER

## 2019-06-20 PROCEDURE — 96366 THER/PROPH/DIAG IV INF ADDON: CPT

## 2019-06-20 RX ORDER — MAGNESIUM SULFATE IN WATER 40 MG/ML
2 INJECTION, SOLUTION INTRAVENOUS ONCE
Status: COMPLETED | OUTPATIENT
Start: 2019-06-20 | End: 2019-06-20

## 2019-06-20 RX ORDER — HEPARIN SODIUM (PORCINE) LOCK FLUSH IV SOLN 100 UNIT/ML 100 UNIT/ML
500 SOLUTION INTRAVENOUS PRN
Status: CANCELLED | OUTPATIENT
Start: 2019-06-20

## 2019-06-20 RX ORDER — MAGNESIUM SULFATE IN WATER 40 MG/ML
2 INJECTION, SOLUTION INTRAVENOUS ONCE
Status: CANCELLED
Start: 2019-06-21

## 2019-06-20 RX ORDER — SODIUM CHLORIDE 0.9 % (FLUSH) 0.9 %
10 SYRINGE (ML) INJECTION PRN
Status: CANCELLED | OUTPATIENT
Start: 2019-06-20

## 2019-06-20 RX ORDER — SODIUM CHLORIDE 9 MG/ML
INJECTION, SOLUTION INTRAVENOUS CONTINUOUS
Status: CANCELLED
Start: 2019-06-20

## 2019-06-20 RX ORDER — SODIUM CHLORIDE 9 MG/ML
INJECTION, SOLUTION INTRAVENOUS CONTINUOUS
Status: DISCONTINUED | OUTPATIENT
Start: 2019-06-20 | End: 2019-06-21 | Stop reason: HOSPADM

## 2019-06-20 RX ADMIN — SODIUM CHLORIDE: 9 INJECTION, SOLUTION INTRAVENOUS at 11:00

## 2019-06-20 RX ADMIN — MAGNESIUM SULFATE HEPTAHYDRATE 2 G: 40 INJECTION, SOLUTION INTRAVENOUS at 11:00

## 2019-06-21 RX ORDER — FLUCONAZOLE 200 MG/1
400 TABLET ORAL DAILY
Qty: 30 TABLET | Refills: 0 | Status: SHIPPED | OUTPATIENT
Start: 2019-06-21 | End: 2020-11-17 | Stop reason: ALTCHOICE

## 2019-06-24 PROBLEM — R76.8 RED BLOOD CELL ANTIBODY POSITIVE: Chronic | Status: ACTIVE | Noted: 2019-06-24

## 2019-06-26 ENCOUNTER — HOSPITAL ENCOUNTER (OUTPATIENT)
Dept: INFUSION THERAPY | Age: 64
Discharge: HOME | End: 2019-06-26
Payer: MEDICARE

## 2019-06-26 DIAGNOSIS — C92.00 ACUTE MYELOID LEUKEMIA NOT HAVING ACHIEVED REMISSION (HCC): ICD-10-CM

## 2019-06-27 ENCOUNTER — TELEPHONE (OUTPATIENT)
Dept: INFUSION THERAPY | Age: 64
End: 2019-06-27

## 2019-06-27 ENCOUNTER — HOSPITAL ENCOUNTER (OUTPATIENT)
Dept: INFUSION THERAPY | Age: 64
End: 2019-06-27
Payer: MEDICAID

## 2019-06-27 NOTE — TELEPHONE ENCOUNTER
Left a message for patient's  to please return call to [phone], with regards to patient missing and appointment with [facility] wound care center and if patient continues to have blisters on her legs, awaiting return call

## 2019-06-27 NOTE — TELEPHONE ENCOUNTER
Called patient to verify if Pily Vega has blisters on her legs from [facility] wound care center\" had notified office that patient had missed her appointment and the center was having trouble contacting patient. Voiced understanding and left a message to patient or  to please return call to [phone], awaiting return call.

## 2019-07-02 ENCOUNTER — TELEPHONE (OUTPATIENT)
Dept: PALLATIVE CARE | Age: 64
End: 2019-07-02

## 2019-07-02 ENCOUNTER — TELEPHONE (OUTPATIENT)
Dept: ONCOLOGY | Age: 64
End: 2019-07-02

## 2019-07-02 DIAGNOSIS — C95.00 ACUTE LEUKEMIA NOT HAVING ACHIEVED REMISSION (HCC): ICD-10-CM

## 2019-07-02 DIAGNOSIS — G62.9 POLYNEUROPATHY: ICD-10-CM

## 2019-07-02 RX ORDER — GABAPENTIN 300 MG/1
600 CAPSULE ORAL 3 TIMES DAILY
Qty: 180 CAPSULE | Refills: 0 | Status: SHIPPED | OUTPATIENT
Start: 2019-07-02 | End: 2019-07-03 | Stop reason: SDUPTHER

## 2019-07-02 RX ORDER — OXYCODONE AND ACETAMINOPHEN 10; 325 MG/1; MG/1
1 TABLET ORAL EVERY 6 HOURS PRN
Qty: 40 TABLET | Refills: 0 | Status: ON HOLD | OUTPATIENT
Start: 2019-07-02 | End: 2019-07-15

## 2019-07-03 DIAGNOSIS — G62.9 POLYNEUROPATHY: ICD-10-CM

## 2019-07-03 DIAGNOSIS — C95.00 ACUTE LEUKEMIA NOT HAVING ACHIEVED REMISSION (HCC): ICD-10-CM

## 2019-07-03 RX ORDER — GABAPENTIN 300 MG/1
600 CAPSULE ORAL 3 TIMES DAILY
Qty: 180 CAPSULE | Refills: 0 | Status: SHIPPED | OUTPATIENT
Start: 2019-07-03 | End: 2019-07-11 | Stop reason: DRUGHIGH

## 2019-07-11 ENCOUNTER — HOSPITAL ENCOUNTER (OUTPATIENT)
Age: 64
Setting detail: OBSERVATION
Discharge: NURSING, REHAB OR LONG-TERM CARE | End: 2019-07-15
Attending: EMERGENCY MEDICINE | Admitting: INTERNAL MEDICINE
Payer: MEDICARE

## 2019-07-11 ENCOUNTER — APPOINTMENT (OUTPATIENT)
Dept: ULTRASOUND IMAGING | Age: 64
End: 2019-07-11
Payer: MEDICARE

## 2019-07-11 ENCOUNTER — APPOINTMENT (OUTPATIENT)
Dept: GENERAL RADIOLOGY | Age: 64
End: 2019-07-11
Payer: MEDICARE

## 2019-07-11 DIAGNOSIS — R53.1 GENERALIZED WEAKNESS: Primary | ICD-10-CM

## 2019-07-11 DIAGNOSIS — C95.00 ACUTE LEUKEMIA NOT HAVING ACHIEVED REMISSION (HCC): ICD-10-CM

## 2019-07-11 DIAGNOSIS — G62.9 POLYNEUROPATHY: ICD-10-CM

## 2019-07-11 LAB
ALBUMIN SERPL-MCNC: 3.5 G/DL (ref 3.5–5.2)
ALP BLD-CCNC: 117 U/L (ref 35–104)
ALT SERPL-CCNC: 14 U/L (ref 0–32)
ANION GAP SERPL CALCULATED.3IONS-SCNC: 15 MMOL/L (ref 7–16)
ANISOCYTOSIS: ABNORMAL
AST SERPL-CCNC: 17 U/L (ref 0–31)
BACTERIA: NORMAL /HPF
BASOPHILS ABSOLUTE: 0 E9/L (ref 0–0.2)
BASOPHILS RELATIVE PERCENT: 0.4 % (ref 0–2)
BILIRUB SERPL-MCNC: 0.2 MG/DL (ref 0–1.2)
BILIRUBIN URINE: NEGATIVE
BLOOD, URINE: ABNORMAL
BUN BLDV-MCNC: 21 MG/DL (ref 8–23)
CALCIUM SERPL-MCNC: 9.5 MG/DL (ref 8.6–10.2)
CASTS: NORMAL /LPF
CHLORIDE BLD-SCNC: 97 MMOL/L (ref 98–107)
CLARITY: CLEAR
CO2: 28 MMOL/L (ref 22–29)
COLOR: YELLOW
CREAT SERPL-MCNC: 1 MG/DL (ref 0.5–1)
EOSINOPHILS ABSOLUTE: 0.5 E9/L (ref 0.05–0.5)
EOSINOPHILS RELATIVE PERCENT: 8.8 % (ref 0–6)
EPITHELIAL CELLS, UA: NORMAL /HPF
GFR AFRICAN AMERICAN: >60
GFR NON-AFRICAN AMERICAN: 56 ML/MIN/1.73
GLUCOSE BLD-MCNC: 280 MG/DL (ref 74–99)
GLUCOSE URINE: 250 MG/DL
HCT VFR BLD CALC: 29.3 % (ref 34–48)
HEMOGLOBIN: 9.5 G/DL (ref 11.5–15.5)
KETONES, URINE: NEGATIVE MG/DL
LACTIC ACID: 4 MMOL/L (ref 0.5–2.2)
LEUKOCYTE ESTERASE, URINE: NEGATIVE
LYMPHOCYTES ABSOLUTE: 0.97 E9/L (ref 1.5–4)
LYMPHOCYTES RELATIVE PERCENT: 16.8 % (ref 20–42)
MCH RBC QN AUTO: 34.9 PG (ref 26–35)
MCHC RBC AUTO-ENTMCNC: 32.4 % (ref 32–34.5)
MCV RBC AUTO: 107.7 FL (ref 80–99.9)
METER GLUCOSE: 231 MG/DL (ref 74–99)
METER GLUCOSE: 263 MG/DL (ref 74–99)
MONOCYTES ABSOLUTE: 0.34 E9/L (ref 0.1–0.95)
MONOCYTES RELATIVE PERCENT: 6.2 % (ref 2–12)
NEUTROPHILS ABSOLUTE: 3.88 E9/L (ref 1.8–7.3)
NEUTROPHILS RELATIVE PERCENT: 68.1 % (ref 43–80)
NITRITE, URINE: NEGATIVE
OVALOCYTES: ABNORMAL
PDW BLD-RTO: 17.6 FL (ref 11.5–15)
PH UA: 6 (ref 5–9)
PLATELET # BLD: 125 E9/L (ref 130–450)
PMV BLD AUTO: 10.2 FL (ref 7–12)
POIKILOCYTES: ABNORMAL
POTASSIUM SERPL-SCNC: 4.3 MMOL/L (ref 3.5–5)
PRO-BNP: 540 PG/ML (ref 0–125)
PROTEIN UA: 100 MG/DL
RBC # BLD: 2.72 E12/L (ref 3.5–5.5)
RBC UA: NORMAL /HPF (ref 0–2)
SODIUM BLD-SCNC: 140 MMOL/L (ref 132–146)
SPECIFIC GRAVITY UA: 1.02 (ref 1–1.03)
TOTAL PROTEIN: 6.3 G/DL (ref 6.4–8.3)
TROPONIN: 0.01 NG/ML (ref 0–0.03)
TSH SERPL DL<=0.05 MIU/L-ACNC: 3.88 UIU/ML (ref 0.27–4.2)
UROBILINOGEN, URINE: 0.2 E.U./DL
WBC # BLD: 5.7 E9/L (ref 4.5–11.5)
WBC UA: NORMAL /HPF (ref 0–5)

## 2019-07-11 PROCEDURE — 87040 BLOOD CULTURE FOR BACTERIA: CPT

## 2019-07-11 PROCEDURE — 83880 ASSAY OF NATRIURETIC PEPTIDE: CPT

## 2019-07-11 PROCEDURE — 83605 ASSAY OF LACTIC ACID: CPT

## 2019-07-11 PROCEDURE — 6370000000 HC RX 637 (ALT 250 FOR IP): Performed by: INTERNAL MEDICINE

## 2019-07-11 PROCEDURE — 82962 GLUCOSE BLOOD TEST: CPT

## 2019-07-11 PROCEDURE — 80053 COMPREHEN METABOLIC PANEL: CPT

## 2019-07-11 PROCEDURE — 84484 ASSAY OF TROPONIN QUANT: CPT

## 2019-07-11 PROCEDURE — 36415 COLL VENOUS BLD VENIPUNCTURE: CPT

## 2019-07-11 PROCEDURE — 85025 COMPLETE CBC W/AUTO DIFF WBC: CPT

## 2019-07-11 PROCEDURE — 87088 URINE BACTERIA CULTURE: CPT

## 2019-07-11 PROCEDURE — 84443 ASSAY THYROID STIM HORMONE: CPT

## 2019-07-11 PROCEDURE — G0378 HOSPITAL OBSERVATION PER HR: HCPCS

## 2019-07-11 PROCEDURE — 93970 EXTREMITY STUDY: CPT

## 2019-07-11 PROCEDURE — 2580000003 HC RX 258: Performed by: PHYSICIAN ASSISTANT

## 2019-07-11 PROCEDURE — 71046 X-RAY EXAM CHEST 2 VIEWS: CPT

## 2019-07-11 PROCEDURE — 81001 URINALYSIS AUTO W/SCOPE: CPT

## 2019-07-11 PROCEDURE — 2580000003 HC RX 258: Performed by: EMERGENCY MEDICINE

## 2019-07-11 PROCEDURE — 93005 ELECTROCARDIOGRAM TRACING: CPT | Performed by: PHYSICIAN ASSISTANT

## 2019-07-11 PROCEDURE — 99285 EMERGENCY DEPT VISIT HI MDM: CPT

## 2019-07-11 RX ORDER — ACETAMINOPHEN 325 MG/1
650 TABLET ORAL EVERY 4 HOURS PRN
Status: DISCONTINUED | OUTPATIENT
Start: 2019-07-11 | End: 2019-07-15 | Stop reason: HOSPADM

## 2019-07-11 RX ORDER — DEXTROSE MONOHYDRATE 25 G/50ML
12.5 INJECTION, SOLUTION INTRAVENOUS PRN
Status: DISCONTINUED | OUTPATIENT
Start: 2019-07-11 | End: 2019-07-15 | Stop reason: HOSPADM

## 2019-07-11 RX ORDER — DEXTROSE MONOHYDRATE 50 MG/ML
100 INJECTION, SOLUTION INTRAVENOUS PRN
Status: DISCONTINUED | OUTPATIENT
Start: 2019-07-11 | End: 2019-07-15 | Stop reason: HOSPADM

## 2019-07-11 RX ORDER — 0.9 % SODIUM CHLORIDE 0.9 %
500 INTRAVENOUS SOLUTION INTRAVENOUS ONCE
Status: COMPLETED | OUTPATIENT
Start: 2019-07-11 | End: 2019-07-11

## 2019-07-11 RX ORDER — OXYCODONE AND ACETAMINOPHEN 10; 325 MG/1; MG/1
1 TABLET ORAL EVERY 6 HOURS PRN
Status: DISCONTINUED | OUTPATIENT
Start: 2019-07-11 | End: 2019-07-15 | Stop reason: HOSPADM

## 2019-07-11 RX ORDER — INSULIN GLARGINE 100 [IU]/ML
45 INJECTION, SOLUTION SUBCUTANEOUS EVERY MORNING
Status: DISCONTINUED | OUTPATIENT
Start: 2019-07-12 | End: 2019-07-15 | Stop reason: HOSPADM

## 2019-07-11 RX ORDER — NICOTINE POLACRILEX 4 MG
15 LOZENGE BUCCAL PRN
Status: DISCONTINUED | OUTPATIENT
Start: 2019-07-11 | End: 2019-07-15 | Stop reason: HOSPADM

## 2019-07-11 RX ORDER — INSULIN GLARGINE 100 [IU]/ML
45 INJECTION, SOLUTION SUBCUTANEOUS EVERY MORNING
COMMUNITY
End: 2020-11-17 | Stop reason: ALTCHOICE

## 2019-07-11 RX ORDER — LORATADINE 10 MG/1
10 TABLET ORAL DAILY
COMMUNITY
End: 2020-11-17 | Stop reason: ALTCHOICE

## 2019-07-11 RX ORDER — MAGNESIUM OXIDE 400 MG/1
400 TABLET ORAL DAILY
COMMUNITY

## 2019-07-11 RX ORDER — GABAPENTIN 300 MG/1
300 CAPSULE ORAL 3 TIMES DAILY
COMMUNITY
End: 2019-11-08 | Stop reason: ALTCHOICE

## 2019-07-11 RX ADMIN — SODIUM CHLORIDE 500 ML: 9 INJECTION, SOLUTION INTRAVENOUS at 17:42

## 2019-07-11 RX ADMIN — SODIUM CHLORIDE 500 ML: 9 INJECTION, SOLUTION INTRAVENOUS at 18:53

## 2019-07-11 RX ADMIN — INSULIN LISPRO 5 UNITS: 100 INJECTION, SOLUTION INTRAVENOUS; SUBCUTANEOUS at 22:54

## 2019-07-11 NOTE — ED PROVIDER NOTES
ED Attending  CC: No      HPI:  7/11/19,   Time: 4:27 PM         Darrell Ness is a 59 y.o. female presenting to the ED for LE swelling and generalized weakness, beginning few days ago. The complaint has been persistent, moderate in severity, and worsened by nothing. The patient presents to the emergency room with her  from home via EMS. She is reporting generalized weakness and difficulty ambulating at home. Patient does have a history of acute myeloid leukemia. She has been following with oncology both here locally and in [facility] [employer]. The patient has had several rounds of chemotherapy already to this point. Her  states that it seems as if over the past few days the patient has gotten weaker and weaker to the point where she cannot even ambulate with use of her walker for go up and down the steps in and out of her home. The patient has increasing lower extremity edema and some mild shortness of breath as well. She denies any fever or chills. She states that she has been eating and drinking normally. No report of hematuria dysuria or polyuria. Denies chest pain. Patient has been in and out of the hospital over the past several months with weakness and sepsis. She also has a history of hypertension, hyperlipidemia, type 2 diabetes mellitus insulin-dependent and coronary artery disease.       ROS:     Constitutional: Negative for fever and chills  HENT: Negative for ear pain, sore throat and sinus pressure  Eyes: Negative for pain, discharge and redness  Respiratory: See HPI  Cardiovascular: See HPI  Gastrointestinal: Negative for nausea, vomiting, diarrhea and abdominal distention  Genitourinary: Negative for dysuria and frequency  Musculoskeletal:  See HPI  Skin: See HPI  Neurological: See HPI  Hematological: Negative for adenopathy    All other systems reviewed and are negative      -------------------------------- PAST HISTORY ----------------------------------  Past Medical History:  has a past medical history of CAD (coronary artery disease), Cancer ([facility] [address] GERD (gastroesophageal reflux disease), Hyperlipidemia, Hypertension, Obesity, Osteoarthritis, and Type 2 diabetes mellitus without complication ([facility] [address] Past Surgical History:  has a past surgical history that includes Cholecystectomy; Dilation and curettage of uterus; Foot surgery (Left); and Cardiac catheterization. Social History:  reports that she has never smoked. She has never used smokeless tobacco. She reports that she does not drink alcohol or use drugs. Family History: family history includes Cancer in her paternal aunt; Coronary Art Dis in her father and mother; Diabetes in her father, mother, and sister; Stroke in her mother. The patients home medications have been reviewed. Allergies: Patient has no known allergies.     --------------------------------- RESULTS ------------------------------------------  All laboratory and radiology results have been personally reviewed by myself   LABS:  Results for orders placed or performed during the hospital encounter of 07/11/19   CBC Auto Differential   Result Value Ref Range    WBC 5.7 4.5 - 11.5 E9/L    RBC 2.72 (L) 3.50 - 5.50 E12/L    Hemoglobin 9.5 (L) 11.5 - 15.5 g/dL    Hematocrit 29.3 (L) 34.0 - 48.0 %    .7 (H) 80.0 - 99.9 fL    MCH 34.9 26.0 - 35.0 pg    MCHC 32.4 32.0 - 34.5 %    RDW 17.6 (H) 11.5 - 15.0 fL    Platelets 171 (L) 082 - 450 E9/L    MPV 10.2 7.0 - 12.0 fL    Neutrophils % 68.1 43.0 - 80.0 %    Lymphocytes % 16.8 (L) 20.0 - 42.0 %    Monocytes % 6.2 2.0 - 12.0 %    Eosinophils % 8.8 (H) 0.0 - 6.0 %    Basophils % 0.4 0.0 - 2.0 %    Neutrophils # 3.88 1.80 - 7.30 E9/L    Lymphocytes # 0.97 (L) 1.50 - 4.00 E9/L    Monocytes # 0.34 0.10 - 0.95 E9/L    Eosinophils # 0.50 0.05 - 0.50 E9/L    Basophils # 0.00 0.00 - 0.20 E9/L    Anisocytosis 1+     Poikilocytes 1+     Ovalocytes 1+    Comprehensive Metabolic Panel   Result Value Ref Range Sodium 140 132 - 146 mmol/L    Potassium 4.3 3.5 - 5.0 mmol/L    Chloride 97 (L) 98 - 107 mmol/L    CO2 28 22 - 29 mmol/L    Anion Gap 15 7 - 16 mmol/L    Glucose 280 (H) 74 - 99 mg/dL    BUN 21 8 - 23 mg/dL    CREATININE 1.0 0.5 - 1.0 mg/dL    GFR Non-African American 56 >=60 mL/min/1.73    GFR African American >60     Calcium 9.5 8.6 - 10.2 mg/dL    Total Protein 6.3 (L) 6.4 - 8.3 g/dL    Alb 3.5 3.5 - 5.2 g/dL    Total Bilirubin 0.2 0.0 - 1.2 mg/dL    Alkaline Phosphatase 117 (H) 35 - 104 U/L    ALT 14 0 - 32 U/L    AST 17 0 - 31 U/L   Troponin   Result Value Ref Range    Troponin 0.01 0.00 - 0.03 ng/mL   Brain Natriuretic Peptide   Result Value Ref Range    Pro- (H) 0 - 125 pg/mL   Urinalysis   Result Value Ref Range    Color, UA Yellow Straw/Yellow    Clarity, UA Clear Clear    Glucose, Ur 250 (A) Negative mg/dL    Bilirubin Urine Negative Negative    Ketones, Urine Negative Negative mg/dL    Specific Gravity, UA 1.020 1.005 - 1.030    Blood, Urine TRACE (A) Negative    pH, UA 6.0 5.0 - 9.0    Protein,  (A) Negative mg/dL    Urobilinogen, Urine 0.2 <2.0 E.U./dL    Nitrite, Urine Negative Negative    Leukocyte Esterase, Urine Negative Negative   Lactic Acid, Plasma   Result Value Ref Range    Lactic Acid 4.0 (H) 0.5 - 2.2 mmol/L   TSH without Reflex   Result Value Ref Range    TSH 3.880 0.270 - 4.200 uIU/mL   Microscopic Urinalysis   Result Value Ref Range    Casts RARE /LPF    WBC, UA 0-1 0 - 5 /HPF    RBC, UA 0-1 0 - 2 /HPF    Epi Cells RARE /HPF    Bacteria, UA NONE /HPF   POCT Glucose   Result Value Ref Range    Meter Glucose 231 (H) 74 - 99 mg/dL   EKG 12 Lead   Result Value Ref Range    Ventricular Rate 80 BPM    Atrial Rate 80 BPM    P-R Interval 162 ms    QRS Duration 80 ms    Q-T Interval 404 ms    QTc Calculation (Bazett) 465 ms    P Axis 17 degrees    T Axis 54 degrees       RADIOLOGY:  Interpreted by Radiologist.  US DUP LOWER EXTREMITIES BILATERAL VENOUS   Final Result   No evidence of

## 2019-07-12 LAB
EKG ATRIAL RATE: 80 BPM
EKG P AXIS: 17 DEGREES
EKG P-R INTERVAL: 162 MS
EKG Q-T INTERVAL: 404 MS
EKG QRS DURATION: 80 MS
EKG QTC CALCULATION (BAZETT): 465 MS
EKG T AXIS: 54 DEGREES
EKG VENTRICULAR RATE: 80 BPM
METER GLUCOSE: 165 MG/DL (ref 74–99)
METER GLUCOSE: 223 MG/DL (ref 74–99)
METER GLUCOSE: 243 MG/DL (ref 74–99)
METER GLUCOSE: 276 MG/DL (ref 74–99)

## 2019-07-12 PROCEDURE — 93010 ELECTROCARDIOGRAM REPORT: CPT | Performed by: INTERNAL MEDICINE

## 2019-07-12 PROCEDURE — G0378 HOSPITAL OBSERVATION PER HR: HCPCS

## 2019-07-12 PROCEDURE — 97165 OT EVAL LOW COMPLEX 30 MIN: CPT

## 2019-07-12 PROCEDURE — 97530 THERAPEUTIC ACTIVITIES: CPT

## 2019-07-12 PROCEDURE — 97161 PT EVAL LOW COMPLEX 20 MIN: CPT | Performed by: PHYSICAL THERAPIST

## 2019-07-12 PROCEDURE — 97116 GAIT TRAINING THERAPY: CPT | Performed by: PHYSICAL THERAPIST

## 2019-07-12 PROCEDURE — 82962 GLUCOSE BLOOD TEST: CPT

## 2019-07-12 PROCEDURE — 6370000000 HC RX 637 (ALT 250 FOR IP): Performed by: INTERNAL MEDICINE

## 2019-07-12 RX ORDER — ISOSORBIDE MONONITRATE 30 MG/1
30 TABLET, EXTENDED RELEASE ORAL DAILY
Status: DISCONTINUED | OUTPATIENT
Start: 2019-07-12 | End: 2019-07-15 | Stop reason: HOSPADM

## 2019-07-12 RX ORDER — GABAPENTIN 300 MG/1
300 CAPSULE ORAL 3 TIMES DAILY
Status: DISCONTINUED | OUTPATIENT
Start: 2019-07-12 | End: 2019-07-15 | Stop reason: HOSPADM

## 2019-07-12 RX ORDER — LOSARTAN POTASSIUM 100 MG/1
100 TABLET ORAL DAILY
Status: DISCONTINUED | OUTPATIENT
Start: 2019-07-12 | End: 2019-07-15 | Stop reason: HOSPADM

## 2019-07-12 RX ORDER — ACYCLOVIR 400 MG/1
400 TABLET ORAL 2 TIMES DAILY
Status: DISCONTINUED | OUTPATIENT
Start: 2019-07-12 | End: 2019-07-15 | Stop reason: HOSPADM

## 2019-07-12 RX ORDER — ATORVASTATIN CALCIUM 40 MG/1
80 TABLET, FILM COATED ORAL DAILY
Status: DISCONTINUED | OUTPATIENT
Start: 2019-07-12 | End: 2019-07-15 | Stop reason: HOSPADM

## 2019-07-12 RX ORDER — ONDANSETRON 4 MG/1
8 TABLET, FILM COATED ORAL EVERY 8 HOURS PRN
Status: DISCONTINUED | OUTPATIENT
Start: 2019-07-12 | End: 2019-07-15 | Stop reason: HOSPADM

## 2019-07-12 RX ORDER — PETROLATUM 430 MG/G
OINTMENT TOPICAL 2 TIMES DAILY PRN
Status: DISCONTINUED | OUTPATIENT
Start: 2019-07-12 | End: 2019-07-15 | Stop reason: HOSPADM

## 2019-07-12 RX ORDER — FLUCONAZOLE 100 MG/1
400 TABLET ORAL DAILY
Status: DISCONTINUED | OUTPATIENT
Start: 2019-07-12 | End: 2019-07-15 | Stop reason: HOSPADM

## 2019-07-12 RX ORDER — METOPROLOL TARTRATE 50 MG/1
100 TABLET, FILM COATED ORAL 2 TIMES DAILY
Status: DISCONTINUED | OUTPATIENT
Start: 2019-07-12 | End: 2019-07-15 | Stop reason: HOSPADM

## 2019-07-12 RX ADMIN — INSULIN LISPRO 5 UNITS: 100 INJECTION, SOLUTION INTRAVENOUS; SUBCUTANEOUS at 20:21

## 2019-07-12 RX ADMIN — MAGNESIUM OXIDE TAB 400 MG (241.3 MG ELEMENTAL MG) 400 MG: 400 (241.3 MG) TAB at 20:20

## 2019-07-12 RX ADMIN — INSULIN LISPRO 6 UNITS: 100 INJECTION, SOLUTION INTRAVENOUS; SUBCUTANEOUS at 16:20

## 2019-07-12 RX ADMIN — INSULIN GLARGINE 45 UNITS: 100 INJECTION, SOLUTION SUBCUTANEOUS at 08:15

## 2019-07-12 RX ADMIN — INSULIN LISPRO 3 UNITS: 100 INJECTION, SOLUTION INTRAVENOUS; SUBCUTANEOUS at 08:16

## 2019-07-12 RX ADMIN — ACYCLOVIR 400 MG: 400 TABLET ORAL at 09:18

## 2019-07-12 RX ADMIN — ACYCLOVIR 400 MG: 400 TABLET ORAL at 20:20

## 2019-07-12 RX ADMIN — METOPROLOL TARTRATE 100 MG: 50 TABLET ORAL at 09:17

## 2019-07-12 RX ADMIN — FLUCONAZOLE 400 MG: 100 TABLET ORAL at 09:17

## 2019-07-12 RX ADMIN — MAGNESIUM OXIDE TAB 400 MG (241.3 MG ELEMENTAL MG) 400 MG: 400 (241.3 MG) TAB at 13:44

## 2019-07-12 RX ADMIN — OXYCODONE HYDROCHLORIDE AND ACETAMINOPHEN 1 TABLET: 10; 325 TABLET ORAL at 00:56

## 2019-07-12 RX ADMIN — GABAPENTIN 300 MG: 300 CAPSULE ORAL at 13:42

## 2019-07-12 RX ADMIN — ATORVASTATIN CALCIUM 80 MG: 40 TABLET, FILM COATED ORAL at 09:17

## 2019-07-12 RX ADMIN — OXYCODONE HYDROCHLORIDE AND ACETAMINOPHEN 1 TABLET: 10; 325 TABLET ORAL at 16:19

## 2019-07-12 RX ADMIN — METOPROLOL TARTRATE 100 MG: 50 TABLET ORAL at 20:20

## 2019-07-12 RX ADMIN — GABAPENTIN 300 MG: 300 CAPSULE ORAL at 20:20

## 2019-07-12 RX ADMIN — GABAPENTIN 300 MG: 300 CAPSULE ORAL at 09:17

## 2019-07-12 RX ADMIN — INSULIN LISPRO 6 UNITS: 100 INJECTION, SOLUTION INTRAVENOUS; SUBCUTANEOUS at 11:40

## 2019-07-12 RX ADMIN — MAGNESIUM OXIDE TAB 400 MG (241.3 MG ELEMENTAL MG) 400 MG: 400 (241.3 MG) TAB at 09:17

## 2019-07-12 RX ADMIN — ISOSORBIDE MONONITRATE 30 MG: 30 TABLET, EXTENDED RELEASE ORAL at 09:17

## 2019-07-12 RX ADMIN — OXYCODONE HYDROCHLORIDE AND ACETAMINOPHEN 1 TABLET: 10; 325 TABLET ORAL at 08:22

## 2019-07-12 RX ADMIN — LOSARTAN POTASSIUM 100 MG: 100 TABLET ORAL at 09:18

## 2019-07-12 ASSESSMENT — PAIN SCALES - GENERAL
PAINLEVEL_OUTOF10: 7
PAINLEVEL_OUTOF10: 4
PAINLEVEL_OUTOF10: 2
PAINLEVEL_OUTOF10: 7

## 2019-07-12 ASSESSMENT — PAIN DESCRIPTION - LOCATION
LOCATION: LEG
LOCATION: GENERALIZED
LOCATION: GENERALIZED

## 2019-07-12 ASSESSMENT — PAIN DESCRIPTION - ONSET
ONSET: ON-GOING
ONSET: ON-GOING

## 2019-07-12 ASSESSMENT — PAIN - FUNCTIONAL ASSESSMENT
PAIN_FUNCTIONAL_ASSESSMENT: PREVENTS OR INTERFERES WITH ALL ACTIVE AND SOME PASSIVE ACTIVITIES
PAIN_FUNCTIONAL_ASSESSMENT: PREVENTS OR INTERFERES WITH ALL ACTIVE AND SOME PASSIVE ACTIVITIES

## 2019-07-12 ASSESSMENT — PAIN DESCRIPTION - FREQUENCY
FREQUENCY: CONTINUOUS
FREQUENCY: CONTINUOUS

## 2019-07-12 ASSESSMENT — PAIN DESCRIPTION - DESCRIPTORS
DESCRIPTORS: ACHING;CRAMPING;DISCOMFORT
DESCRIPTORS: DISCOMFORT;SORE;TENDER
DESCRIPTORS: ACHING;CONSTANT;DISCOMFORT

## 2019-07-12 ASSESSMENT — PAIN DESCRIPTION - PAIN TYPE
TYPE: CHRONIC PAIN

## 2019-07-12 ASSESSMENT — PAIN DESCRIPTION - ORIENTATION: ORIENTATION: LEFT

## 2019-07-12 ASSESSMENT — PAIN DESCRIPTION - PROGRESSION: CLINICAL_PROGRESSION: NOT CHANGED

## 2019-07-12 NOTE — CARE COORDINATION
SS NOTE: [address] [place] [address] has accepted pt for admission and have begun precert today. For a SNF placement pt will need a precert with insurance, signed NGUYỄN, current PT/OT notes and SW will complete the PASRR. SW discussed the need to hold off on chemo until Rehab program is completed-  SNF's do not like to accept pt's who are on this type of tx. Elif Yu. 7/12/2019.2:08PM.

## 2019-07-12 NOTE — PROGRESS NOTES
Occupational Therapy  Occupational Therapy Initial Assessment      Date:2019  Patient Name: Carrie Duffy  MRN: [id number]  : 1955  Room: [address] [place] [address]      Evaluating OT: DENISE Lopez/ L #[id number]      Recommendation for Placement: Subacute  Recommended Adaptive Equipment: TBD   AM-PAC Daily Activity Raw Score:         Diagnosis: Generalized weakness    Pertinent Medical History:    Past Medical History:   Diagnosis Date    CAD (coronary artery disease)     [facility] ([email or web address]     GERD (gastroesophageal reflux disease)     Hyperlipidemia     Hypertension     Obesity     Osteoarthritis     Type 2 diabetes mellitus without complication ([email or web address]       Precautions:  Falls,      Home Living: Pt lives with  and son single family home, 1 story, 5 steps to enter with rail, walk-in tub shower. Equipment owned: wheeled walker, cane, bs commode, shower chair, grab bars, reacher, sock aide, rollater    Prior Level of Function: Needs assist with ADLs , Needs assist with IADLs; ambulated rollater  Driving: no    Pain Level: 6/10 RLE; Nursing aware, RLE elevated   Cognition: A&O: 4/4; Follows 2 step directions   Memory:  good     Sequencing:  good     Problem solving:  good     Judgement/safety:  good       Functional Assessment:   Initial Eval Status  Date: 19 Treatment Status  Date: Short Term Goals  Treatment frequency: PRN 1-3x/week   Feeding Independent   Independent    Grooming Minimal Assist   Independent    UB Dressing Independent   Independent    LB Dressing Maximal Assist   Modified [place]    Bathing Moderate Assist  Modified [place]    Toileting Moderate Assist   Modified [place]    Bed Mobility  Supine to sit: Moderate Assist   Sit to supine:  Moderate Assist   Supine to sit: Minimal Assist   Sit to supine: Minimal Assist    Functional Transfers Minimal Assist for sit to stand from EOB  Independent    Functional Mobility Minimal Assist with walker for approx

## 2019-07-12 NOTE — PROGRESS NOTES
Physical Therapy      Room #:   0321/0321-01  Patient Name: Sarina Powers    PHYSICAL THERAPY INITIAL EVALUATION    Tentative placement recommendation: Subacute rehab    Equipment recommendation: Patient has needed equipment       Plan of care: Patient will be seen    daily  for therapeutic exercise, functional retraining, endurance activities, balance exercises, family and patient education. AM-PAC Basic Mobility        AM-[facility] Mobility Inpatient   How much difficulty turning over in bed?: A Little  How much difficulty sitting down on / standing up from a chair with arms?: A Little  How much difficulty moving from lying on back to sitting on side of bed?: A Lot  How much help from another person moving to and from a bed to a chair?: A Little  How much help from another person needed to walk in hospital room?: A Little  How much help from another person for climbing 3-5 steps with a railing?: A Lot  AM-PAC Inpatient Mobility Raw Score : 16  AM-PAC Inpatient T-Scale Score : 40.78  Mobility Inpatient CMS 0-100% Score: 54.16  Mobility Inpatient CMS G-Code Modifier : CK    Order:  EVALUATE AND TREAT 2 falls injj july  Diagnosis/Problem list:    1. Generalized weakness        Patient Active Problem List   Diagnosis    Acute leukemia not having achieved remission ([facility] [address]    Generalized weakness    Sepsis ([facility] [address]    Hypomagnesemia    Red blood cell antibody positive       Past medical history:       Diagnosis Date    CAD (coronary artery disease)     Cancer ([facility] [address]     GERD (gastroesophageal reflux disease)     Hyperlipidemia     Hypertension     Obesity     Osteoarthritis     Type 2 diabetes mellitus without complication ([facility] [address]    ;       Procedure Laterality Date    CARDIAC CATHETERIZATION      CHOLECYSTECTOMY      DILATION AND CURETTAGE OF UTERUS      FOOT SURGERY Left        The admitting diagnosis and active problem list as listed above have been reviewed prior to the initiation of this evaluation. Last time out of bed: prior to admit    Precautions: falls ,   Social history: Patient lives with spouse  in a ranch home  with 5 steps to enter with Rail    Prior Level of Function: Patient ambulated independently    Equipment owned: Robert Martinez, Wheeled Walker, Rollator, Bedside commode and Shower chair,       Mentation: alert, cooperative, oriented x 3  and follows directions,      ROM: wfl    STRENGTH: 4-/5  PAIN: (measured on a visual analog scale with 0=no pain and 10=excruciating pain) 6/10. right leg    FUNCTIONAL ASSESSMENT   Bed Mobility- Supine to sit- Moderate assist          Scooting- Moderate assist       Sit to supine- Minimal assist     Patient able to dangle on edge of bed for 10 minutes with no assist      Transfers-Sit to stand- Minimal assist  From elevated bed x 2 reps   Gait:  Patient ambulated 25 feet using wheeled walker with Minimal assist for balance   Steps:  Not assessed      Balance: sit-good         stand fair       Edema: yes - bilateral lower extremities  Endurance: poor      Treatment:    Therapist educated and facilitated patient on techniques to increase safety and independence with bed mobility, balance, functional transfers, and functional mobility. Sat edge of bed to increase dynamic sitting balance and activity tolerance. Patient demonstrating good   understanding of education/techniques, requiring additional training/education. At end of session, patient in bed with   call light and phone within reach, alarm all lines and tubes intact, nursing notified. Pt would benefit from continued skilled PT to increase functional independence and quality of life. Rehab Potential: good  for baseline  Patients Goal: home      ASSESSMENT  Patient exhibits decreased strength, balance, coordination impairing functional mobility. GOALS to be met in 2 days.    Bed mobility-  Independent        Transfers-Sit to stand-Independent     Gait:  Patient to ambulate 75 feet

## 2019-07-13 PROBLEM — I87.2 VENOUS INSUFFICIENCY OF BOTH LOWER EXTREMITIES: Chronic | Status: ACTIVE | Noted: 2019-07-13

## 2019-07-13 PROBLEM — I87.339 STASIS DERMATITIS WITH VENOUS ULCER OF LOWER EXTREMITY DUE TO CHRONIC PERIPHERAL VENOUS HYPERTENSION (HCC): Chronic | Status: ACTIVE | Noted: 2019-07-13

## 2019-07-13 PROBLEM — I10 ESSENTIAL HYPERTENSION: Chronic | Status: ACTIVE | Noted: 2019-07-13

## 2019-07-13 PROBLEM — L97.909 STASIS DERMATITIS WITH VENOUS ULCER OF LOWER EXTREMITY DUE TO CHRONIC PERIPHERAL VENOUS HYPERTENSION (HCC): Chronic | Status: ACTIVE | Noted: 2019-07-13

## 2019-07-13 PROBLEM — R26.2 IMPAIRED AMBULATION: Status: ACTIVE | Noted: 2019-07-13

## 2019-07-13 PROBLEM — E11.43 TYPE 2 DIABETES MELLITUS WITH DIABETIC AUTONOMIC NEUROPATHY, WITH LONG-TERM CURRENT USE OF INSULIN (HCC): Chronic | Status: ACTIVE | Noted: 2019-07-13

## 2019-07-13 PROBLEM — I25.10 CORONARY ARTERY DISEASE INVOLVING NATIVE CORONARY ARTERY OF NATIVE HEART WITHOUT ANGINA PECTORIS: Chronic | Status: ACTIVE | Noted: 2019-07-13

## 2019-07-13 PROBLEM — A41.9 SEPSIS (HCC): Status: RESOLVED | Noted: 2019-02-28 | Resolved: 2019-07-13

## 2019-07-13 PROBLEM — Z79.4 TYPE 2 DIABETES MELLITUS WITH DIABETIC AUTONOMIC NEUROPATHY, WITH LONG-TERM CURRENT USE OF INSULIN (HCC): Chronic | Status: ACTIVE | Noted: 2019-07-13

## 2019-07-13 PROBLEM — R26.9 GAIT DISORDER: Status: ACTIVE | Noted: 2019-07-13

## 2019-07-13 PROBLEM — R76.8 RED BLOOD CELL ANTIBODY POSITIVE: Chronic | Status: RESOLVED | Noted: 2019-06-24 | Resolved: 2019-07-13

## 2019-07-13 LAB
METER GLUCOSE: 201 MG/DL (ref 74–99)
METER GLUCOSE: 227 MG/DL (ref 74–99)
METER GLUCOSE: 230 MG/DL (ref 74–99)
METER GLUCOSE: 292 MG/DL (ref 74–99)
URINE CULTURE, ROUTINE: NORMAL

## 2019-07-13 PROCEDURE — G0378 HOSPITAL OBSERVATION PER HR: HCPCS

## 2019-07-13 PROCEDURE — 6370000000 HC RX 637 (ALT 250 FOR IP): Performed by: INTERNAL MEDICINE

## 2019-07-13 PROCEDURE — 82962 GLUCOSE BLOOD TEST: CPT

## 2019-07-13 RX ADMIN — OXYCODONE HYDROCHLORIDE AND ACETAMINOPHEN 1 TABLET: 10; 325 TABLET ORAL at 04:01

## 2019-07-13 RX ADMIN — FLUCONAZOLE 400 MG: 100 TABLET ORAL at 08:30

## 2019-07-13 RX ADMIN — ACYCLOVIR 400 MG: 400 TABLET ORAL at 20:47

## 2019-07-13 RX ADMIN — ATORVASTATIN CALCIUM 80 MG: 40 TABLET, FILM COATED ORAL at 07:59

## 2019-07-13 RX ADMIN — MAGNESIUM OXIDE TAB 400 MG (241.3 MG ELEMENTAL MG) 400 MG: 400 (241.3 MG) TAB at 20:47

## 2019-07-13 RX ADMIN — OXYCODONE HYDROCHLORIDE AND ACETAMINOPHEN 1 TABLET: 10; 325 TABLET ORAL at 16:31

## 2019-07-13 RX ADMIN — GABAPENTIN 300 MG: 300 CAPSULE ORAL at 20:47

## 2019-07-13 RX ADMIN — INSULIN LISPRO 3 UNITS: 100 INJECTION, SOLUTION INTRAVENOUS; SUBCUTANEOUS at 21:01

## 2019-07-13 RX ADMIN — INSULIN LISPRO 6 UNITS: 100 INJECTION, SOLUTION INTRAVENOUS; SUBCUTANEOUS at 08:02

## 2019-07-13 RX ADMIN — INSULIN GLARGINE 45 UNITS: 100 INJECTION, SOLUTION SUBCUTANEOUS at 08:01

## 2019-07-13 RX ADMIN — METOPROLOL TARTRATE 100 MG: 50 TABLET ORAL at 20:47

## 2019-07-13 RX ADMIN — ACYCLOVIR 400 MG: 400 TABLET ORAL at 09:30

## 2019-07-13 RX ADMIN — INSULIN LISPRO 6 UNITS: 100 INJECTION, SOLUTION INTRAVENOUS; SUBCUTANEOUS at 16:31

## 2019-07-13 RX ADMIN — OXYCODONE HYDROCHLORIDE AND ACETAMINOPHEN 1 TABLET: 10; 325 TABLET ORAL at 09:09

## 2019-07-13 RX ADMIN — ACETAMINOPHEN 650 MG: 325 TABLET, FILM COATED ORAL at 20:47

## 2019-07-13 RX ADMIN — MAGNESIUM OXIDE TAB 400 MG (241.3 MG ELEMENTAL MG) 400 MG: 400 (241.3 MG) TAB at 08:59

## 2019-07-13 RX ADMIN — GABAPENTIN 300 MG: 300 CAPSULE ORAL at 09:00

## 2019-07-13 RX ADMIN — ISOSORBIDE MONONITRATE 30 MG: 30 TABLET, EXTENDED RELEASE ORAL at 08:00

## 2019-07-13 RX ADMIN — INSULIN LISPRO 9 UNITS: 100 INJECTION, SOLUTION INTRAVENOUS; SUBCUTANEOUS at 12:04

## 2019-07-13 ASSESSMENT — PAIN DESCRIPTION - ORIENTATION
ORIENTATION: LEFT
ORIENTATION: LEFT

## 2019-07-13 ASSESSMENT — PAIN DESCRIPTION - PAIN TYPE
TYPE: CHRONIC PAIN
TYPE: CHRONIC PAIN

## 2019-07-13 ASSESSMENT — PAIN SCALES - GENERAL
PAINLEVEL_OUTOF10: 7
PAINLEVEL_OUTOF10: 7
PAINLEVEL_OUTOF10: 8
PAINLEVEL_OUTOF10: 4
PAINLEVEL_OUTOF10: 7
PAINLEVEL_OUTOF10: 3
PAINLEVEL_OUTOF10: 0
PAINLEVEL_OUTOF10: 6

## 2019-07-13 ASSESSMENT — PAIN DESCRIPTION - LOCATION
LOCATION: LEG
LOCATION: LEG

## 2019-07-13 ASSESSMENT — PAIN DESCRIPTION - PROGRESSION
CLINICAL_PROGRESSION: NOT CHANGED
CLINICAL_PROGRESSION: GRADUALLY WORSENING

## 2019-07-13 ASSESSMENT — PAIN DESCRIPTION - ONSET
ONSET: ON-GOING
ONSET: ON-GOING

## 2019-07-13 ASSESSMENT — PAIN - FUNCTIONAL ASSESSMENT
PAIN_FUNCTIONAL_ASSESSMENT: PREVENTS OR INTERFERES SOME ACTIVE ACTIVITIES AND ADLS
PAIN_FUNCTIONAL_ASSESSMENT: PREVENTS OR INTERFERES WITH MANY ACTIVE NOT PASSIVE ACTIVITIES

## 2019-07-13 ASSESSMENT — PAIN DESCRIPTION - DESCRIPTORS
DESCRIPTORS: ACHING;CRAMPING;DISCOMFORT
DESCRIPTORS: ACHING;DISCOMFORT;CONSTANT

## 2019-07-13 ASSESSMENT — PAIN DESCRIPTION - FREQUENCY
FREQUENCY: CONTINUOUS
FREQUENCY: CONTINUOUS

## 2019-07-13 NOTE — PROGRESS NOTES
house in [place], as per charge nurse it will take up to 2-3 days, hopefully will be able to transfer her on Monday. meanwhile continue current Rx, PT and OT          Electronically signed by Jt Campos MD on 7/13/2019 at 1:39 PM

## 2019-07-14 LAB
METER GLUCOSE: 211 MG/DL (ref 74–99)
METER GLUCOSE: 232 MG/DL (ref 74–99)
METER GLUCOSE: 250 MG/DL (ref 74–99)
METER GLUCOSE: 305 MG/DL (ref 74–99)

## 2019-07-14 PROCEDURE — 97116 GAIT TRAINING THERAPY: CPT

## 2019-07-14 PROCEDURE — G0378 HOSPITAL OBSERVATION PER HR: HCPCS

## 2019-07-14 PROCEDURE — 97530 THERAPEUTIC ACTIVITIES: CPT

## 2019-07-14 PROCEDURE — 6360000002 HC RX W HCPCS: Performed by: INTERNAL MEDICINE

## 2019-07-14 PROCEDURE — 82962 GLUCOSE BLOOD TEST: CPT

## 2019-07-14 PROCEDURE — 96372 THER/PROPH/DIAG INJ SC/IM: CPT

## 2019-07-14 PROCEDURE — 6370000000 HC RX 637 (ALT 250 FOR IP): Performed by: INTERNAL MEDICINE

## 2019-07-14 RX ADMIN — ACYCLOVIR 400 MG: 400 TABLET ORAL at 20:30

## 2019-07-14 RX ADMIN — ACYCLOVIR 400 MG: 400 TABLET ORAL at 09:30

## 2019-07-14 RX ADMIN — ENOXAPARIN SODIUM 40 MG: 40 INJECTION SUBCUTANEOUS at 08:48

## 2019-07-14 RX ADMIN — OXYCODONE HYDROCHLORIDE AND ACETAMINOPHEN 1 TABLET: 10; 325 TABLET ORAL at 21:38

## 2019-07-14 RX ADMIN — GABAPENTIN 300 MG: 300 CAPSULE ORAL at 14:30

## 2019-07-14 RX ADMIN — ISOSORBIDE MONONITRATE 30 MG: 30 TABLET, EXTENDED RELEASE ORAL at 07:49

## 2019-07-14 RX ADMIN — OXYCODONE HYDROCHLORIDE AND ACETAMINOPHEN 1 TABLET: 10; 325 TABLET ORAL at 07:49

## 2019-07-14 RX ADMIN — INSULIN LISPRO 3 UNITS: 100 INJECTION, SOLUTION INTRAVENOUS; SUBCUTANEOUS at 20:30

## 2019-07-14 RX ADMIN — METOPROLOL TARTRATE 100 MG: 50 TABLET ORAL at 20:30

## 2019-07-14 RX ADMIN — FLUCONAZOLE 400 MG: 100 TABLET ORAL at 08:49

## 2019-07-14 RX ADMIN — MAGNESIUM OXIDE TAB 400 MG (241.3 MG ELEMENTAL MG) 400 MG: 400 (241.3 MG) TAB at 08:49

## 2019-07-14 RX ADMIN — LOSARTAN POTASSIUM 100 MG: 100 TABLET ORAL at 09:37

## 2019-07-14 RX ADMIN — ATORVASTATIN CALCIUM 80 MG: 40 TABLET, FILM COATED ORAL at 07:49

## 2019-07-14 RX ADMIN — GABAPENTIN 300 MG: 300 CAPSULE ORAL at 08:48

## 2019-07-14 RX ADMIN — INSULIN LISPRO 6 UNITS: 100 INJECTION, SOLUTION INTRAVENOUS; SUBCUTANEOUS at 07:56

## 2019-07-14 RX ADMIN — METOPROLOL TARTRATE 100 MG: 50 TABLET ORAL at 08:48

## 2019-07-14 RX ADMIN — MAGNESIUM OXIDE TAB 400 MG (241.3 MG ELEMENTAL MG) 400 MG: 400 (241.3 MG) TAB at 20:30

## 2019-07-14 RX ADMIN — INSULIN LISPRO 12 UNITS: 100 INJECTION, SOLUTION INTRAVENOUS; SUBCUTANEOUS at 11:41

## 2019-07-14 RX ADMIN — INSULIN GLARGINE 45 UNITS: 100 INJECTION, SOLUTION SUBCUTANEOUS at 07:51

## 2019-07-14 RX ADMIN — GABAPENTIN 300 MG: 300 CAPSULE ORAL at 20:30

## 2019-07-14 RX ADMIN — MAGNESIUM OXIDE TAB 400 MG (241.3 MG ELEMENTAL MG) 400 MG: 400 (241.3 MG) TAB at 14:30

## 2019-07-14 RX ADMIN — INSULIN LISPRO 9 UNITS: 100 INJECTION, SOLUTION INTRAVENOUS; SUBCUTANEOUS at 16:41

## 2019-07-14 ASSESSMENT — PAIN SCALES - GENERAL
PAINLEVEL_OUTOF10: 7
PAINLEVEL_OUTOF10: 0
PAINLEVEL_OUTOF10: 7
PAINLEVEL_OUTOF10: 0
PAINLEVEL_OUTOF10: 2

## 2019-07-14 ASSESSMENT — PAIN DESCRIPTION - PROGRESSION
CLINICAL_PROGRESSION: NOT CHANGED
CLINICAL_PROGRESSION: NOT CHANGED

## 2019-07-14 ASSESSMENT — PAIN DESCRIPTION - LOCATION
LOCATION: LEG
LOCATION: LEG

## 2019-07-14 ASSESSMENT — PAIN DESCRIPTION - FREQUENCY
FREQUENCY: CONTINUOUS
FREQUENCY: CONTINUOUS

## 2019-07-14 ASSESSMENT — PAIN DESCRIPTION - DESCRIPTORS
DESCRIPTORS: ACHING
DESCRIPTORS: ACHING;CONSTANT;DISCOMFORT

## 2019-07-14 ASSESSMENT — PAIN - FUNCTIONAL ASSESSMENT: PAIN_FUNCTIONAL_ASSESSMENT: PREVENTS OR INTERFERES SOME ACTIVE ACTIVITIES AND ADLS

## 2019-07-14 ASSESSMENT — PAIN DESCRIPTION - ORIENTATION
ORIENTATION: RIGHT;LEFT
ORIENTATION: RIGHT;LEFT

## 2019-07-14 ASSESSMENT — PAIN DESCRIPTION - PAIN TYPE
TYPE: ACUTE PAIN
TYPE: ACUTE PAIN

## 2019-07-14 ASSESSMENT — PAIN DESCRIPTION - ONSET
ONSET: AWAKENED FROM SLEEP
ONSET: ON-GOING

## 2019-07-14 NOTE — PROGRESS NOTES
assistance    Sit to supine- Moderate assistance     Transfers-sit to stand- Moderate assistance of 2     Gait: Not assessed   Steps: Not assessed  Treatment: Pt was educated and facilitated on techniques to increase safety and independence with bed mobility, balance, functional transfers, and functional mobility. Pt transferred to side of bed and sat up x 20 minutes to increase dynamic sitting balance and activity tolerance. Pt stood x 2 reps and stood in place for < 1 minute each. Pt scooted towards HOB x 3 reps. Pt returned to supine and rolled x 2 reps for linen adjustment. Pt required moderate assistance for rolling. Comment: Call light left within reach of patient. Pt remained in bed at end of treatment, alarm was activated. A: Pt was unable to progress treatment or ambulate d/t BLE weakness. Pt c/o knees feeling weak and that they could \"give out\". Pt was motivated to participate as able but fatigued easily. Pt is at risk of falls. P: Continue with physical therapy     Shavon Johnson PTA     GOALS to be met in 2 days. Bed mobility- Independent                                         Transfers-Sit to stand- Independent                Gait: Patient to ambulate 75 feet using wheeled walker with Supervision                 Steps: Patient to go up and down 5  step(s) using 1 rail(s) with Supervision           Increase strength in affected mm groups by 1/3 grade  Increase balance to allow for improvement towards functional goals. Increase endurance to allow for improvement towards functional goals.

## 2019-07-14 NOTE — DISCHARGE INSTR - COC
Continuity of Care Form    Patient Name: Mikey Salas   :  1955  MRN:  [id number]    Admit date:  2019  Discharge date:  7/15/2019    Code Status Order: No Order   Advance Directives:   [facility] Documentation     Date/Time Healthcare Directive Type of Healthcare Directive Copy in [facility] [address] Agent's Name Healthcare Agent's Phone Number    [phone]  No, patient does not have an advance directive for healthcare treatment -- -- -- -- --          Admitting Physician:  Iván Lewis MD  PCP: Iván Lewis MD    Discharging Nurse: [facility] Unit/Room#: 0321/0321-01  Discharging Unit Phone Number: [phone]    Emergency Contact:   Extended Emergency Contact Information  Primary Emergency Contact: Celso Eason  Address: [address] [place] [address] Phone: [phone]  Mobile Phone: [phone]  Relation: Spouse    Past Surgical History:  Past Surgical History:   Procedure Laterality Date    CARDIAC CATHETERIZATION      CHOLECYSTECTOMY      DILATION AND CURETTAGE OF UTERUS      FOOT SURGERY Left        Immunization History: There is no immunization history on file for this patient.     Active Problems:  Patient Active Problem List   Diagnosis Code    Acute leukemia not having achieved remission ([facility] [address] C95.00    Generalized weakness R53.1    Hypomagnesemia E83.42    Gait disorder R26.9    Impaired ambulation R26.2    Type 2 diabetes mellitus with diabetic autonomic neuropathy, with long-term current use of insulin (HCC) E11.43, Z79.4    Stasis dermatitis with venous ulcer of lower extremity due to chronic peripheral venous hypertension (HCC) I87.339, L97.909    Venous insufficiency of both lower extremities I87.2    Essential hypertension I10    Coronary artery disease involving native coronary artery of native heart without angina pectoris I25.10

## 2019-07-14 NOTE — PROGRESS NOTES
patient to [facility] in [place], as per charge nurse it will take up to 2-3 days, hopefully will be able to transfer her tomorrow. meanwhile continue current Rx, PT and OT.           Electronically signed by Brayan Sylvester MD on 7/14/2019 at 11:12 AM

## 2019-07-14 NOTE — PLAN OF CARE
Problem: Falls - Risk of:  Goal: Will remain free from falls  Description  Will remain free from falls  7/14/2019 0310 by Meet Saunders RN  Outcome: Met This Shift  7/13/2019 2238 by Julieta Muñoz RN  Outcome: Met This Shift  Goal: Absence of physical injury  Description  Absence of physical injury  7/14/2019 0310 by Meet Saunders RN  Outcome: Met This Shift  7/13/2019 2238 by Julieta Muñoz RN  Outcome: Met This Shift     Problem: Risk for Impaired Skin Integrity  Goal: Tissue integrity - skin and mucous membranes  Description  Structural intactness and normal physiological function of skin and  mucous membranes.   7/14/2019 0310 by Meet Saunders RN  Outcome: Met This Shift  7/13/2019 2238 by Julieta Muñoz RN  Outcome: Met This Shift     Problem: Pain:  Goal: Pain level will decrease  Description  Pain level will decrease  7/14/2019 0310 by Meet Saunders RN  Outcome: Met This Shift  7/13/2019 2238 by Julieta Muñoz RN  Outcome: Met This Shift  Goal: Control of acute pain  Description  Control of acute pain  7/13/2019 2238 by Julieta Muñoz RN  Outcome: Met This Shift

## 2019-07-15 VITALS
RESPIRATION RATE: 16 BRPM | HEART RATE: 83 BPM | SYSTOLIC BLOOD PRESSURE: 138 MMHG | WEIGHT: 293 LBS | HEIGHT: 71 IN | OXYGEN SATURATION: 95 % | TEMPERATURE: 98.3 F | DIASTOLIC BLOOD PRESSURE: 80 MMHG | BODY MASS INDEX: 41.02 KG/M2

## 2019-07-15 LAB
METER GLUCOSE: 188 MG/DL (ref 74–99)
METER GLUCOSE: 278 MG/DL (ref 74–99)

## 2019-07-15 PROCEDURE — 6370000000 HC RX 637 (ALT 250 FOR IP): Performed by: INTERNAL MEDICINE

## 2019-07-15 PROCEDURE — 97530 THERAPEUTIC ACTIVITIES: CPT

## 2019-07-15 PROCEDURE — 6360000002 HC RX W HCPCS: Performed by: INTERNAL MEDICINE

## 2019-07-15 PROCEDURE — G0378 HOSPITAL OBSERVATION PER HR: HCPCS

## 2019-07-15 PROCEDURE — 96372 THER/PROPH/DIAG INJ SC/IM: CPT

## 2019-07-15 PROCEDURE — 97110 THERAPEUTIC EXERCISES: CPT

## 2019-07-15 PROCEDURE — 82962 GLUCOSE BLOOD TEST: CPT

## 2019-07-15 RX ORDER — OXYCODONE AND ACETAMINOPHEN 10; 325 MG/1; MG/1
1 TABLET ORAL EVERY 6 HOURS PRN
Qty: 40 TABLET | Refills: 0
Start: 2019-07-15 | End: 2019-08-08 | Stop reason: SDUPTHER

## 2019-07-15 RX ORDER — PETROLATUM 430 MG/G
1 OINTMENT TOPICAL 2 TIMES DAILY PRN
Qty: 1 TUBE | Refills: 0
Start: 2019-07-15

## 2019-07-15 RX ADMIN — METOPROLOL TARTRATE 100 MG: 50 TABLET ORAL at 09:31

## 2019-07-15 RX ADMIN — GABAPENTIN 300 MG: 300 CAPSULE ORAL at 15:00

## 2019-07-15 RX ADMIN — ENOXAPARIN SODIUM 40 MG: 40 INJECTION SUBCUTANEOUS at 09:32

## 2019-07-15 RX ADMIN — ISOSORBIDE MONONITRATE 30 MG: 30 TABLET, EXTENDED RELEASE ORAL at 09:31

## 2019-07-15 RX ADMIN — MAGNESIUM OXIDE TAB 400 MG (241.3 MG ELEMENTAL MG) 400 MG: 400 (241.3 MG) TAB at 09:31

## 2019-07-15 RX ADMIN — ACYCLOVIR 400 MG: 400 TABLET ORAL at 09:32

## 2019-07-15 RX ADMIN — GABAPENTIN 300 MG: 300 CAPSULE ORAL at 09:32

## 2019-07-15 RX ADMIN — ATORVASTATIN CALCIUM 80 MG: 40 TABLET, FILM COATED ORAL at 09:31

## 2019-07-15 RX ADMIN — INSULIN LISPRO 3 UNITS: 100 INJECTION, SOLUTION INTRAVENOUS; SUBCUTANEOUS at 09:30

## 2019-07-15 RX ADMIN — FLUCONAZOLE 400 MG: 100 TABLET ORAL at 09:31

## 2019-07-15 RX ADMIN — INSULIN GLARGINE 45 UNITS: 100 INJECTION, SOLUTION SUBCUTANEOUS at 09:30

## 2019-07-15 RX ADMIN — INSULIN LISPRO 9 UNITS: 100 INJECTION, SOLUTION INTRAVENOUS; SUBCUTANEOUS at 12:26

## 2019-07-15 RX ADMIN — OXYCODONE HYDROCHLORIDE AND ACETAMINOPHEN 1 TABLET: 10; 325 TABLET ORAL at 09:31

## 2019-07-15 RX ADMIN — LOSARTAN POTASSIUM 100 MG: 100 TABLET ORAL at 09:32

## 2019-07-15 ASSESSMENT — PAIN SCALES - GENERAL
PAINLEVEL_OUTOF10: 0
PAINLEVEL_OUTOF10: 6
PAINLEVEL_OUTOF10: 0
PAINLEVEL_OUTOF10: 0

## 2019-07-15 NOTE — PROGRESS NOTES
[facility] Quality Flow/Interdisciplinary Rounds Progress Note        Quality Flow Rounds held on July 15, 2019    Disciplines Attending:  Bedside Nurse, ,  and Nursing Unit Leadership    Maxime Ag was admitted on 7/11/2019  2:18 PM    Anticipated Discharge Date:       Disposition:    Jose Score:  Jose Scale Score: 17    Readmission Risk              Risk of Unplanned Readmission:        15           Discussed patient goal for the day, patient clinical progression, and barriers to discharge.   The following Goal(s) of the Day/Commitment(s) have been identified:  Awaiting precert to [facility]  July 15, 2019

## 2019-07-15 NOTE — PROGRESS NOTES
PHYSICAL THERAPY TREATMENT NOTE    7/15/2019  0321/0321-01                      Tirso Brewer   [id number]                              1955    Patient Active Problem List   Diagnosis    Acute leukemia not having achieved remission ([facility] [address]    Generalized weakness    Hypomagnesemia    Gait disorder    Impaired ambulation    Type 2 diabetes mellitus with diabetic autonomic neuropathy, with long-term current use of insulin (HCC)    Stasis dermatitis with venous ulcer of lower extremity due to chronic peripheral venous hypertension (HCC)    Venous insufficiency of both lower extremities    Essential hypertension    Coronary artery disease involving native coronary artery of native heart without angina pectoris       Recommendation for discharge: subacute rehab   Equipment prescriptions needed: patient has needed equipment    AM-[facility] Mobility Inpatient   How much difficulty turning over in bed?: A Little  How much difficulty sitting down on / standing up from a chair with arms?: A Little  How much difficulty moving from lying on back to sitting on side of bed?: A Little  How much help from another person moving to and from a bed to a chair?: A Little  How much help from another person needed to walk in hospital room?: A Little  How much help from another person for climbing 3-5 steps with a railing?: A Lot  -PAC Inpatient Mobility Raw Score : 17  AM-PAC Inpatient T-Scale Score : 42.13  Mobility Inpatient CMS 0-100% Score: 50.57  Mobility Inpatient CMS G-Code Modifier : CK      Precautions: falls, alarm, purewick catheter, depends    S: Patient cleared by nursing for treatment. Patient is agreeable to treatment. Pt c/o pain with L knee and pain from head to toe. Pain status: (measured on a visual analog scale with 0=no pain and 10=excruciating pain)  8/10.    O: Pt was instructed in and performed the following:   Bed Mobility- Supine to sit- Minimal assistance     Scooting- Minimal assistance    Sit to supine- Not assessed   Transfers-sit to stand- Minimal assistance    Gait: Pt ambulated 20 feet using her rollator with Supervision. V/C for upright posture, pacing, and safety. Steps: Not assessed  Treatment: Pt performed supine ex's: ankle pumps, quad sets, gluteal sets, heel slides, hip abd/add, SLR, x 10 reps AAROM/AROM. V/C for technique. Pt transferred to edge of bed, sat for 3 minutes, stood, ambulated to the door and back to the edge of the bed. Comment: Call light left within reach of patient. Pt on edge of bed at end of tx session and notified nursing. A: Pt able to progress with increased distance with no LOB or L knee buckling noted. Pt c/o L knee feeling weak and fearful of giving out. Pt is at risk of falls. P: Continue with physical therapy     CHAGO FAJARDO, PTA     GOALS to be met in 2 days. Bed mobility- Independent                                         Transfers-Sit to stand- Independent                Gait: Patient to ambulate 75 feet using wheeled walker with Supervision                 Steps: Patient to go up and down 5  step(s) using 1 rail(s) with Supervision           Increase strength in affected mm groups by 1/3 grade  Increase balance to allow for improvement towards functional goals. Increase endurance to allow for improvement towards functional goals.

## 2019-07-16 LAB
BLOOD CULTURE, ROUTINE: NORMAL
CULTURE, BLOOD 2: NORMAL

## 2019-07-31 ENCOUNTER — TELEPHONE (OUTPATIENT)
Dept: PALLATIVE CARE | Age: 64
End: 2019-07-31

## 2019-08-01 ENCOUNTER — TELEPHONE (OUTPATIENT)
Dept: PALLATIVE CARE | Age: 64
End: 2019-08-01

## 2019-08-02 ENCOUNTER — TELEPHONE (OUTPATIENT)
Dept: PALLATIVE CARE | Age: 64
End: 2019-08-02

## 2019-08-08 ENCOUNTER — TELEPHONE (OUTPATIENT)
Dept: PALLATIVE CARE | Age: 64
End: 2019-08-08

## 2019-08-08 DIAGNOSIS — G62.9 POLYNEUROPATHY: ICD-10-CM

## 2019-08-08 DIAGNOSIS — C95.00 ACUTE LEUKEMIA NOT HAVING ACHIEVED REMISSION (HCC): ICD-10-CM

## 2019-08-08 RX ORDER — OXYCODONE AND ACETAMINOPHEN 10; 325 MG/1; MG/1
1 TABLET ORAL EVERY 6 HOURS PRN
Qty: 56 TABLET | Refills: 0 | Status: SHIPPED | OUTPATIENT
Start: 2019-08-08 | End: 2019-08-22 | Stop reason: SDUPTHER

## 2019-08-15 ENCOUNTER — TELEPHONE (OUTPATIENT)
Dept: PALLATIVE CARE | Age: 64
End: 2019-08-15

## 2019-08-19 ENCOUNTER — TELEPHONE (OUTPATIENT)
Dept: PALLATIVE CARE | Age: 64
End: 2019-08-19

## 2019-08-22 DIAGNOSIS — G62.9 POLYNEUROPATHY: ICD-10-CM

## 2019-08-22 DIAGNOSIS — C95.00 ACUTE LEUKEMIA NOT HAVING ACHIEVED REMISSION (HCC): ICD-10-CM

## 2019-08-22 RX ORDER — OXYCODONE AND ACETAMINOPHEN 10; 325 MG/1; MG/1
1 TABLET ORAL EVERY 6 HOURS PRN
Qty: 56 TABLET | Refills: 0 | Status: SHIPPED | OUTPATIENT
Start: 2019-08-22 | End: 2019-08-29 | Stop reason: SDUPTHER

## 2019-08-29 ENCOUNTER — OFFICE VISIT (OUTPATIENT)
Dept: PALLATIVE CARE | Age: 64
End: 2019-08-29
Payer: MEDICARE

## 2019-08-29 DIAGNOSIS — C95.00 ACUTE LEUKEMIA NOT HAVING ACHIEVED REMISSION (HCC): ICD-10-CM

## 2019-08-29 DIAGNOSIS — Z51.5 PALLIATIVE CARE BY SPECIALIST: Primary | ICD-10-CM

## 2019-08-29 DIAGNOSIS — G62.9 POLYNEUROPATHY: ICD-10-CM

## 2019-08-29 PROCEDURE — 99350 HOME/RES VST EST HIGH MDM 60: CPT | Performed by: NURSE PRACTITIONER

## 2019-08-29 RX ORDER — GABAPENTIN 100 MG/1
100 CAPSULE ORAL NIGHTLY
Qty: 90 CAPSULE | Refills: 0 | Status: SHIPPED | OUTPATIENT
Start: 2019-08-29 | End: 2019-12-06

## 2019-08-29 RX ORDER — OXYCODONE AND ACETAMINOPHEN 10; 325 MG/1; MG/1
1 TABLET ORAL EVERY 6 HOURS PRN
Qty: 120 TABLET | Refills: 0 | Status: SHIPPED | OUTPATIENT
Start: 2019-08-29 | End: 2019-10-01 | Stop reason: SDUPTHER

## 2019-08-29 NOTE — PROGRESS NOTES
Highest education level: Not on file   Occupational History    Not on file   Social Needs    Financial resource strain: Not on file    Food insecurity:     Worry: Not on file     Inability: Not on file    Transportation needs:     Medical: Not on file     Non-medical: Not on file   Tobacco Use    Smoking status: Never Smoker    Smokeless tobacco: Never Used   Substance and Sexual Activity    Alcohol use: No    Drug use: No    Sexual activity: Not Currently     Partners: Male   Lifestyle    Physical activity:     Days per week: Not on file     Minutes per session: Not on file    Stress: Not on file   Relationships    Social connections:     Talks on phone: Not on file     Gets together: Not on file     Attends Evangelical service: Not on file     Active member of club or organization: Not on file     Attends meetings of clubs or organizations: Not on file     Relationship status: Not on file    Intimate partner violence:     Fear of current or ex partner: Not on file     Emotionally abused: Not on file     Physically abused: Not on file     Forced sexual activity: Not on file   Other Topics Concern    Not on file   Social History Narrative    Not on file         Family history:  Family History   Problem Relation Age of Onset    Coronary Art Dis Mother     Stroke Mother     Diabetes Mother     Coronary Art Dis Father     Diabetes Father     Diabetes Sister     Cancer Paternal Aunt         Bone       PE:  Gen: WD, WN, NAD, awake, alert, able to voice their needs/thoughts   HEENT: normocephalic, atraumatic, sclera nonicteric, PERRLA, EOMI, MMM, good speech tone and quality  Neck: no LAD, no JVD, supple, no masses, normal speech, trachea midline,   Lungs: bilaterally clear to auscultation, good aeration, symmetric  Heart: regular rate and rhythm, no murmurs/rubs/gallops/ectopy appreciated, PMI WNL  Abd.: non-distended, soft, nontender, non-distended, normal bowel sounds  Ext.: no clubbing, cyanosis or medication side effects, risk of tolerance/dependence & alternative treatments discussed. ;No signs of potential drug abuse or diversion identified. ;Assessed functional status. ;Obtaining appropriate analgesic effect of treatment. Chronic Pain > 50 MEDD Re-evaluated the status of the patient's underlying condition causing pain.;Obtained or confirmed \"Consent for Opioid Use\" on file.        Time in minutes:      [] 10   [] 15   [x] 30   [] 45   [] 60   [] 75   [] 90  Chart review/documentation: [x] 10   [] 15   [] 30   [] 45   [] 60   [] 75   [] 90  Assessment:     [x] 10   [] 15   [] 30   [] 45   [] 60   [] 75   [] 90  Conversation patient/family/staff:  [x] 10   [] 15   [] 30   [] 45   [] 60   [] 75   [] [address] [facility] MARJ-CNP  8/29/2019

## 2019-08-30 ENCOUNTER — CLINICAL DOCUMENTATION (OUTPATIENT)
Dept: PALLATIVE CARE | Age: 64
End: 2019-08-30

## 2019-08-30 NOTE — PROGRESS NOTES
Palliative Medicine Social Work   Late entry: visit held 8/29/19    Patient Name: Di Dunn  Age: 59 y.o. [place] Status: None    Next of Kin: Tonya Hewitt (spouse)               Ph: [phone]    Additional Support: Reviewed; support is limited. Pt does have a sister who lives locally [employer]) but is only able to provide limited support as she is caring for her . Minor Children: Pt's son just turned 25years old and graduated high school this year. Advanced Directives: Reviewed; pt does not have a living will or durable healthcare poa. Pt would like to complete this paperwork at a future visit. At this time, she states she would want her  to be her durable POA. Confirm Code Status: Reviewed; full code per pt request.     Current Goals of Care: Reviewed with pt. Goals are to live longer, improve or maintain function/ quality of life, remain at home and continue current management. Mental Health History: Reviewed; none noted. Pt does endorse feelings of situational depression related to her illness. She is also grieving the loss of her twin sister as well as her niece who both passed within the last year. Offered support and encouraged pt to consider grief counseling. Pt is open to this once she is able to get out of the home again. Substance Abuse: Reviewed; none noted    Indications of Abuse/Neglect: Reviewed; none noted    Financial Concerns: Reviewed; no financial barriers to care at this time. Living Situation: Pt lives in a 2-story home with her  and son. Bathroom and bedroom are on the first floor. Physical Care Needs Met: Pt is moderately independent with ADLs. She is having difficulty with walking and is confined mostly to her bedroom. Pt does have home PT and is working to gain her strength back in her legs.      Emotional Needs Met: Time spent exploring pts thoughts and feelings, providing support through active listening and validation of

## 2019-09-26 ENCOUNTER — TELEPHONE (OUTPATIENT)
Dept: PALLATIVE CARE | Age: 64
End: 2019-09-26

## 2019-10-01 DIAGNOSIS — G62.9 POLYNEUROPATHY: ICD-10-CM

## 2019-10-01 DIAGNOSIS — C95.00 ACUTE LEUKEMIA NOT HAVING ACHIEVED REMISSION (HCC): ICD-10-CM

## 2019-10-01 RX ORDER — OXYCODONE AND ACETAMINOPHEN 10; 325 MG/1; MG/1
1 TABLET ORAL EVERY 6 HOURS PRN
Qty: 120 TABLET | Refills: 0 | Status: SHIPPED | OUTPATIENT
Start: 2019-10-01 | End: 2019-10-29 | Stop reason: SDUPTHER

## 2019-10-07 ENCOUNTER — TELEPHONE (OUTPATIENT)
Dept: PALLATIVE CARE | Age: 64
End: 2019-10-07

## 2019-10-22 ENCOUNTER — TELEPHONE (OUTPATIENT)
Dept: PALLATIVE CARE | Age: 64
End: 2019-10-22

## 2019-10-22 ENCOUNTER — APPOINTMENT (OUTPATIENT)
Dept: CT IMAGING | Age: 64
End: 2019-10-22
Payer: MEDICARE

## 2019-10-22 ENCOUNTER — HOSPITAL ENCOUNTER (OUTPATIENT)
Age: 64
Setting detail: OBSERVATION
Discharge: HOME | End: 2019-10-24
Attending: EMERGENCY MEDICINE | Admitting: INTERNAL MEDICINE
Payer: MEDICARE

## 2019-10-22 DIAGNOSIS — I21.4 NSTEMI (NON-ST ELEVATED MYOCARDIAL INFARCTION) (HCC): Primary | ICD-10-CM

## 2019-10-22 PROBLEM — R79.89 ELEVATED TROPONIN: Status: ACTIVE | Noted: 2019-10-22

## 2019-10-22 PROBLEM — R07.9 CHEST PAIN: Status: ACTIVE | Noted: 2019-10-22

## 2019-10-22 PROBLEM — R77.8 ELEVATED TROPONIN: Status: ACTIVE | Noted: 2019-10-22

## 2019-10-22 LAB
ALBUMIN SERPL-MCNC: 3.6 G/DL (ref 3.5–5.2)
ALP BLD-CCNC: 107 U/L (ref 35–104)
ALT SERPL-CCNC: 11 U/L (ref 0–32)
ANION GAP SERPL CALCULATED.3IONS-SCNC: 12 MMOL/L (ref 7–16)
AST SERPL-CCNC: 15 U/L (ref 0–31)
BACTERIA: ABNORMAL /HPF
BILIRUB SERPL-MCNC: <0.2 MG/DL (ref 0–1.2)
BILIRUBIN URINE: NEGATIVE
BLOOD, URINE: ABNORMAL
BUN BLDV-MCNC: 22 MG/DL (ref 8–23)
CALCIUM SERPL-MCNC: 9.7 MG/DL (ref 8.6–10.2)
CHLORIDE BLD-SCNC: 99 MMOL/L (ref 98–107)
CK MB: 3.2 NG/ML (ref 0–4.3)
CLARITY: ABNORMAL
CO2: 30 MMOL/L (ref 22–29)
COLOR: YELLOW
CREAT SERPL-MCNC: 1 MG/DL (ref 0.5–1)
EPITHELIAL CELLS, UA: ABNORMAL /HPF
GFR AFRICAN AMERICAN: >60
GFR NON-AFRICAN AMERICAN: 56 ML/MIN/1.73
GLUCOSE BLD-MCNC: 187 MG/DL (ref 74–99)
GLUCOSE URINE: NEGATIVE MG/DL
HCT VFR BLD CALC: 30.6 % (ref 34–48)
HEMOGLOBIN: 10.2 G/DL (ref 11.5–15.5)
KETONES, URINE: NEGATIVE MG/DL
LEUKOCYTE ESTERASE, URINE: ABNORMAL
LIPASE: 9 U/L (ref 13–60)
MCH RBC QN AUTO: 33.7 PG (ref 26–35)
MCHC RBC AUTO-ENTMCNC: 33.3 % (ref 32–34.5)
MCV RBC AUTO: 101 FL (ref 80–99.9)
NITRITE, URINE: NEGATIVE
PDW BLD-RTO: 13.2 FL (ref 11.5–15)
PH UA: 6 (ref 5–9)
PLATELET # BLD: 150 E9/L (ref 130–450)
PMV BLD AUTO: 9.6 FL (ref 7–12)
POTASSIUM REFLEX MAGNESIUM: 4.1 MMOL/L (ref 3.5–5)
PROTEIN UA: 100 MG/DL
RBC # BLD: 3.03 E12/L (ref 3.5–5.5)
RBC UA: ABNORMAL /HPF (ref 0–2)
SODIUM BLD-SCNC: 141 MMOL/L (ref 132–146)
SPECIFIC GRAVITY UA: 1.02 (ref 1–1.03)
TOTAL CK: 70 U/L (ref 20–180)
TOTAL PROTEIN: 6.7 G/DL (ref 6.4–8.3)
TROPONIN: 0.04 NG/ML (ref 0–0.03)
UROBILINOGEN, URINE: 0.2 E.U./DL
WBC # BLD: 9.2 E9/L (ref 4.5–11.5)
WBC UA: ABNORMAL /HPF (ref 0–5)

## 2019-10-22 PROCEDURE — 6360000002 HC RX W HCPCS: Performed by: STUDENT IN AN ORGANIZED HEALTH CARE EDUCATION/TRAINING PROGRAM

## 2019-10-22 PROCEDURE — 80053 COMPREHEN METABOLIC PANEL: CPT

## 2019-10-22 PROCEDURE — 82553 CREATINE MB FRACTION: CPT

## 2019-10-22 PROCEDURE — G0378 HOSPITAL OBSERVATION PER HR: HCPCS

## 2019-10-22 PROCEDURE — 85027 COMPLETE CBC AUTOMATED: CPT

## 2019-10-22 PROCEDURE — 71275 CT ANGIOGRAPHY CHEST: CPT

## 2019-10-22 PROCEDURE — 99285 EMERGENCY DEPT VISIT HI MDM: CPT

## 2019-10-22 PROCEDURE — 96375 TX/PRO/DX INJ NEW DRUG ADDON: CPT

## 2019-10-22 PROCEDURE — 6370000000 HC RX 637 (ALT 250 FOR IP): Performed by: STUDENT IN AN ORGANIZED HEALTH CARE EDUCATION/TRAINING PROGRAM

## 2019-10-22 PROCEDURE — 83690 ASSAY OF LIPASE: CPT

## 2019-10-22 PROCEDURE — 93005 ELECTROCARDIOGRAM TRACING: CPT | Performed by: STUDENT IN AN ORGANIZED HEALTH CARE EDUCATION/TRAINING PROGRAM

## 2019-10-22 PROCEDURE — 6360000004 HC RX CONTRAST MEDICATION: Performed by: RADIOLOGY

## 2019-10-22 PROCEDURE — 96374 THER/PROPH/DIAG INJ IV PUSH: CPT

## 2019-10-22 PROCEDURE — 84484 ASSAY OF TROPONIN QUANT: CPT

## 2019-10-22 PROCEDURE — 81001 URINALYSIS AUTO W/SCOPE: CPT

## 2019-10-22 PROCEDURE — 2580000003 HC RX 258: Performed by: STUDENT IN AN ORGANIZED HEALTH CARE EDUCATION/TRAINING PROGRAM

## 2019-10-22 PROCEDURE — 82550 ASSAY OF CK (CPK): CPT

## 2019-10-22 PROCEDURE — 36415 COLL VENOUS BLD VENIPUNCTURE: CPT

## 2019-10-22 RX ORDER — SENNA PLUS 8.6 MG/1
1 TABLET ORAL DAILY PRN
Status: DISCONTINUED | OUTPATIENT
Start: 2019-10-22 | End: 2019-10-24 | Stop reason: HOSPADM

## 2019-10-22 RX ORDER — ISOSORBIDE MONONITRATE 30 MG/1
30 TABLET, EXTENDED RELEASE ORAL DAILY
Status: DISCONTINUED | OUTPATIENT
Start: 2019-10-23 | End: 2019-10-24 | Stop reason: HOSPADM

## 2019-10-22 RX ORDER — METOPROLOL TARTRATE 50 MG/1
100 TABLET, FILM COATED ORAL 2 TIMES DAILY
Status: DISCONTINUED | OUTPATIENT
Start: 2019-10-22 | End: 2019-10-23

## 2019-10-22 RX ORDER — SODIUM CHLORIDE 0.9 % (FLUSH) 0.9 %
10 SYRINGE (ML) INJECTION EVERY 12 HOURS SCHEDULED
Status: CANCELLED | OUTPATIENT
Start: 2019-10-22

## 2019-10-22 RX ORDER — ACYCLOVIR 400 MG/1
400 TABLET ORAL 2 TIMES DAILY
Status: CANCELLED | OUTPATIENT
Start: 2019-10-22

## 2019-10-22 RX ORDER — SODIUM CHLORIDE 0.9 % (FLUSH) 0.9 %
10 SYRINGE (ML) INJECTION PRN
Status: CANCELLED | OUTPATIENT
Start: 2019-10-22

## 2019-10-22 RX ORDER — GABAPENTIN 300 MG/1
300 CAPSULE ORAL 3 TIMES DAILY
Status: DISCONTINUED | OUTPATIENT
Start: 2019-10-22 | End: 2019-10-24 | Stop reason: HOSPADM

## 2019-10-22 RX ORDER — ACYCLOVIR 200 MG/1
400 CAPSULE ORAL 2 TIMES DAILY
Status: DISCONTINUED | OUTPATIENT
Start: 2019-10-22 | End: 2019-10-24 | Stop reason: HOSPADM

## 2019-10-22 RX ORDER — SENNA PLUS 8.6 MG/1
1 TABLET ORAL DAILY PRN
COMMUNITY

## 2019-10-22 RX ORDER — NICOTINE POLACRILEX 4 MG
15 LOZENGE BUCCAL PRN
Status: DISCONTINUED | OUTPATIENT
Start: 2019-10-22 | End: 2019-10-24 | Stop reason: HOSPADM

## 2019-10-22 RX ORDER — POTASSIUM CHLORIDE 20 MEQ/1
40 TABLET, EXTENDED RELEASE ORAL PRN
Status: CANCELLED | OUTPATIENT
Start: 2019-10-22

## 2019-10-22 RX ORDER — ASPIRIN 81 MG/1
162 TABLET, CHEWABLE ORAL ONCE
Status: COMPLETED | OUTPATIENT
Start: 2019-10-22 | End: 2019-10-22

## 2019-10-22 RX ORDER — INSULIN GLARGINE 100 [IU]/ML
45 INJECTION, SOLUTION SUBCUTANEOUS EVERY MORNING
Status: DISCONTINUED | OUTPATIENT
Start: 2019-10-23 | End: 2019-10-24 | Stop reason: HOSPADM

## 2019-10-22 RX ORDER — DEXTROSE MONOHYDRATE 50 MG/ML
100 INJECTION, SOLUTION INTRAVENOUS PRN
Status: DISCONTINUED | OUTPATIENT
Start: 2019-10-22 | End: 2019-10-24 | Stop reason: HOSPADM

## 2019-10-22 RX ORDER — LANOLIN ALCOHOL/MO/W.PET/CERES
400 CREAM (GRAM) TOPICAL 3 TIMES DAILY
Status: DISCONTINUED | OUTPATIENT
Start: 2019-10-22 | End: 2019-10-24 | Stop reason: HOSPADM

## 2019-10-22 RX ORDER — LOSARTAN POTASSIUM 50 MG/1
100 TABLET ORAL DAILY
Status: DISCONTINUED | OUTPATIENT
Start: 2019-10-23 | End: 2019-10-24 | Stop reason: HOSPADM

## 2019-10-22 RX ORDER — DEXTROSE MONOHYDRATE 25 G/50ML
12.5 INJECTION, SOLUTION INTRAVENOUS PRN
Status: DISCONTINUED | OUTPATIENT
Start: 2019-10-22 | End: 2019-10-24 | Stop reason: HOSPADM

## 2019-10-22 RX ORDER — PANTOPRAZOLE SODIUM 40 MG/1
40 TABLET, DELAYED RELEASE ORAL DAILY
Status: CANCELLED | OUTPATIENT
Start: 2019-10-23

## 2019-10-22 RX ORDER — CETIRIZINE HYDROCHLORIDE 10 MG/1
10 TABLET ORAL DAILY
Status: DISCONTINUED | OUTPATIENT
Start: 2019-10-23 | End: 2019-10-24 | Stop reason: HOSPADM

## 2019-10-22 RX ORDER — ISOSORBIDE MONONITRATE 30 MG/1
30 TABLET, EXTENDED RELEASE ORAL DAILY
Status: CANCELLED | OUTPATIENT
Start: 2019-10-23

## 2019-10-22 RX ORDER — LOSARTAN POTASSIUM 100 MG/1
100 TABLET ORAL DAILY
Status: CANCELLED | OUTPATIENT
Start: 2019-10-23

## 2019-10-22 RX ORDER — PETROLATUM 430 MG/G
1 OINTMENT TOPICAL 2 TIMES DAILY PRN
Status: DISCONTINUED | OUTPATIENT
Start: 2019-10-22 | End: 2019-10-24 | Stop reason: HOSPADM

## 2019-10-22 RX ORDER — ONDANSETRON 4 MG/1
8 TABLET, FILM COATED ORAL EVERY 8 HOURS PRN
Status: DISCONTINUED | OUTPATIENT
Start: 2019-10-22 | End: 2019-10-24 | Stop reason: HOSPADM

## 2019-10-22 RX ORDER — POTASSIUM CHLORIDE 7.45 MG/ML
10 INJECTION INTRAVENOUS PRN
Status: CANCELLED | OUTPATIENT
Start: 2019-10-22

## 2019-10-22 RX ORDER — ONDANSETRON 2 MG/ML
4 INJECTION INTRAMUSCULAR; INTRAVENOUS EVERY 6 HOURS PRN
Status: CANCELLED | OUTPATIENT
Start: 2019-10-22

## 2019-10-22 RX ORDER — INSULIN GLARGINE 100 [IU]/ML
45 INJECTION, SOLUTION SUBCUTANEOUS EVERY MORNING
Status: CANCELLED | OUTPATIENT
Start: 2019-10-23

## 2019-10-22 RX ORDER — 0.9 % SODIUM CHLORIDE 0.9 %
500 INTRAVENOUS SOLUTION INTRAVENOUS ONCE
Status: COMPLETED | OUTPATIENT
Start: 2019-10-22 | End: 2019-10-22

## 2019-10-22 RX ORDER — GABAPENTIN 300 MG/1
300 CAPSULE ORAL 3 TIMES DAILY
Status: CANCELLED | OUTPATIENT
Start: 2019-10-22

## 2019-10-22 RX ORDER — OXYCODONE AND ACETAMINOPHEN 10; 325 MG/1; MG/1
1 TABLET ORAL EVERY 6 HOURS PRN
Status: DISCONTINUED | OUTPATIENT
Start: 2019-10-22 | End: 2019-10-24 | Stop reason: HOSPADM

## 2019-10-22 RX ORDER — ATORVASTATIN CALCIUM 40 MG/1
80 TABLET, FILM COATED ORAL DAILY
Status: CANCELLED | OUTPATIENT
Start: 2019-10-23

## 2019-10-22 RX ORDER — PANTOPRAZOLE SODIUM 40 MG/1
40 TABLET, DELAYED RELEASE ORAL DAILY
Status: DISCONTINUED | OUTPATIENT
Start: 2019-10-23 | End: 2019-10-24 | Stop reason: HOSPADM

## 2019-10-22 RX ORDER — SULFAMETHOXAZOLE AND TRIMETHOPRIM 800; 160 MG/1; MG/1
1 TABLET ORAL EVERY 12 HOURS SCHEDULED
Status: CANCELLED | OUTPATIENT
Start: 2019-10-23 | End: 2019-10-25

## 2019-10-22 RX ORDER — MAGNESIUM SULFATE 1 G/100ML
1 INJECTION INTRAVENOUS PRN
Status: CANCELLED | OUTPATIENT
Start: 2019-10-22

## 2019-10-22 RX ORDER — ONDANSETRON 2 MG/ML
4 INJECTION INTRAMUSCULAR; INTRAVENOUS ONCE
Status: COMPLETED | OUTPATIENT
Start: 2019-10-22 | End: 2019-10-22

## 2019-10-22 RX ORDER — ACETAMINOPHEN 325 MG/1
650 TABLET ORAL EVERY 4 HOURS PRN
Status: CANCELLED | OUTPATIENT
Start: 2019-10-22

## 2019-10-22 RX ADMIN — ONDANSETRON 4 MG: 2 INJECTION INTRAMUSCULAR; INTRAVENOUS at 17:43

## 2019-10-22 RX ADMIN — SODIUM CHLORIDE 500 ML: 9 INJECTION, SOLUTION INTRAVENOUS at 17:43

## 2019-10-22 RX ADMIN — ASPIRIN 81 MG 162 MG: 81 TABLET ORAL at 18:17

## 2019-10-22 RX ADMIN — WATER 1 G: 1 INJECTION INTRAMUSCULAR; INTRAVENOUS; SUBCUTANEOUS at 21:00

## 2019-10-22 RX ADMIN — IOPAMIDOL 80 ML: 755 INJECTION, SOLUTION INTRAVENOUS at 18:43

## 2019-10-22 ASSESSMENT — PAIN DESCRIPTION - ONSET: ONSET: ON-GOING

## 2019-10-22 ASSESSMENT — PAIN SCALES - GENERAL
PAINLEVEL_OUTOF10: 0
PAINLEVEL_OUTOF10: 7
PAINLEVEL_OUTOF10: 7

## 2019-10-22 ASSESSMENT — ENCOUNTER SYMPTOMS
CONSTIPATION: 0
NAUSEA: 1
CHEST TIGHTNESS: 0
SHORTNESS OF BREATH: 1
WHEEZING: 0
SINUS PAIN: 0
BLOOD IN STOOL: 0
EYE PAIN: 0
ABDOMINAL PAIN: 1
RHINORRHEA: 0
DIARRHEA: 0
VOMITING: 0
COUGH: 0

## 2019-10-22 ASSESSMENT — PAIN DESCRIPTION - LOCATION
LOCATION: ABDOMEN
LOCATION: ABDOMEN

## 2019-10-22 ASSESSMENT — PAIN DESCRIPTION - PAIN TYPE
TYPE: ACUTE PAIN
TYPE: ACUTE PAIN

## 2019-10-22 ASSESSMENT — PAIN DESCRIPTION - PROGRESSION: CLINICAL_PROGRESSION: NOT CHANGED

## 2019-10-23 LAB
EKG ATRIAL RATE: 75 BPM
EKG P AXIS: 11 DEGREES
EKG P-R INTERVAL: 130 MS
EKG Q-T INTERVAL: 408 MS
EKG QRS DURATION: 72 MS
EKG QTC CALCULATION (BAZETT): 455 MS
EKG R AXIS: -4 DEGREES
EKG T AXIS: 54 DEGREES
EKG VENTRICULAR RATE: 75 BPM
METER GLUCOSE: 187 MG/DL (ref 74–99)
METER GLUCOSE: 202 MG/DL (ref 74–99)
METER GLUCOSE: 212 MG/DL (ref 74–99)
METER GLUCOSE: 234 MG/DL (ref 74–99)
METER GLUCOSE: 247 MG/DL (ref 74–99)
TROPONIN: 0.03 NG/ML (ref 0–0.03)
TROPONIN: 0.04 NG/ML (ref 0–0.03)

## 2019-10-23 PROCEDURE — 87077 CULTURE AEROBIC IDENTIFY: CPT

## 2019-10-23 PROCEDURE — 36415 COLL VENOUS BLD VENIPUNCTURE: CPT

## 2019-10-23 PROCEDURE — 6370000000 HC RX 637 (ALT 250 FOR IP): Performed by: INTERNAL MEDICINE

## 2019-10-23 PROCEDURE — G0378 HOSPITAL OBSERVATION PER HR: HCPCS

## 2019-10-23 PROCEDURE — 6360000002 HC RX W HCPCS: Performed by: INTERNAL MEDICINE

## 2019-10-23 PROCEDURE — 96376 TX/PRO/DX INJ SAME DRUG ADON: CPT

## 2019-10-23 PROCEDURE — 82962 GLUCOSE BLOOD TEST: CPT

## 2019-10-23 PROCEDURE — 93005 ELECTROCARDIOGRAM TRACING: CPT | Performed by: INTERNAL MEDICINE

## 2019-10-23 PROCEDURE — 2580000003 HC RX 258: Performed by: INTERNAL MEDICINE

## 2019-10-23 PROCEDURE — 87088 URINE BACTERIA CULTURE: CPT

## 2019-10-23 PROCEDURE — 87186 SC STD MICRODIL/AGAR DIL: CPT

## 2019-10-23 PROCEDURE — 96372 THER/PROPH/DIAG INJ SC/IM: CPT

## 2019-10-23 PROCEDURE — 93010 ELECTROCARDIOGRAM REPORT: CPT | Performed by: INTERNAL MEDICINE

## 2019-10-23 PROCEDURE — 84484 ASSAY OF TROPONIN QUANT: CPT

## 2019-10-23 RX ORDER — METOPROLOL TARTRATE 50 MG/1
200 TABLET, FILM COATED ORAL ONCE
Status: COMPLETED | OUTPATIENT
Start: 2019-10-24 | End: 2019-10-24

## 2019-10-23 RX ORDER — METOPROLOL TARTRATE 50 MG/1
100 TABLET, FILM COATED ORAL 2 TIMES DAILY
Status: DISCONTINUED | OUTPATIENT
Start: 2019-10-23 | End: 2019-10-23

## 2019-10-23 RX ORDER — METOPROLOL TARTRATE 50 MG/1
100 TABLET, FILM COATED ORAL 2 TIMES DAILY
Status: DISCONTINUED | OUTPATIENT
Start: 2019-10-25 | End: 2019-10-24 | Stop reason: HOSPADM

## 2019-10-23 RX ORDER — METOPROLOL TARTRATE 50 MG/1
100 TABLET, FILM COATED ORAL ONCE
Status: DISCONTINUED | OUTPATIENT
Start: 2019-10-23 | End: 2019-10-23

## 2019-10-23 RX ORDER — METOPROLOL TARTRATE 50 MG/1
100 TABLET, FILM COATED ORAL ONCE
Status: COMPLETED | OUTPATIENT
Start: 2019-10-23 | End: 2019-10-23

## 2019-10-23 RX ORDER — METOPROLOL TARTRATE 50 MG/1
100 TABLET, FILM COATED ORAL 2 TIMES DAILY
Status: DISCONTINUED | OUTPATIENT
Start: 2019-10-24 | End: 2019-10-23

## 2019-10-23 RX ORDER — SODIUM CHLORIDE 9 MG/ML
INJECTION, SOLUTION INTRAVENOUS CONTINUOUS
Status: DISCONTINUED | OUTPATIENT
Start: 2019-10-23 | End: 2019-10-24 | Stop reason: HOSPADM

## 2019-10-23 RX ADMIN — OXYCODONE HYDROCHLORIDE AND ACETAMINOPHEN 1 TABLET: 10; 325 TABLET ORAL at 11:16

## 2019-10-23 RX ADMIN — OXYCODONE HYDROCHLORIDE AND ACETAMINOPHEN 1 TABLET: 10; 325 TABLET ORAL at 03:39

## 2019-10-23 RX ADMIN — METOPROLOL TARTRATE 100 MG: 50 TABLET ORAL at 07:48

## 2019-10-23 RX ADMIN — INSULIN LISPRO 2 UNITS: 100 INJECTION, SOLUTION INTRAVENOUS; SUBCUTANEOUS at 20:46

## 2019-10-23 RX ADMIN — ACYCLOVIR 400 MG: 200 CAPSULE ORAL at 00:11

## 2019-10-23 RX ADMIN — Medication 400 MG: at 20:31

## 2019-10-23 RX ADMIN — Medication 400 MG: at 00:12

## 2019-10-23 RX ADMIN — GABAPENTIN 300 MG: 300 CAPSULE ORAL at 00:12

## 2019-10-23 RX ADMIN — PETROLATUM 1 DROP: 430 OINTMENT TOPICAL at 11:16

## 2019-10-23 RX ADMIN — ACYCLOVIR 400 MG: 200 CAPSULE ORAL at 07:48

## 2019-10-23 RX ADMIN — ACYCLOVIR 400 MG: 200 CAPSULE ORAL at 20:31

## 2019-10-23 RX ADMIN — INSULIN LISPRO 4 UNITS: 100 INJECTION, SOLUTION INTRAVENOUS; SUBCUTANEOUS at 16:25

## 2019-10-23 RX ADMIN — Medication 400 MG: at 07:49

## 2019-10-23 RX ADMIN — LOSARTAN POTASSIUM 100 MG: 50 TABLET ORAL at 07:56

## 2019-10-23 RX ADMIN — GABAPENTIN 300 MG: 300 CAPSULE ORAL at 07:50

## 2019-10-23 RX ADMIN — INSULIN LISPRO 4 UNITS: 100 INJECTION, SOLUTION INTRAVENOUS; SUBCUTANEOUS at 11:15

## 2019-10-23 RX ADMIN — GABAPENTIN 300 MG: 300 CAPSULE ORAL at 20:31

## 2019-10-23 RX ADMIN — INSULIN GLARGINE 45 UNITS: 100 INJECTION, SOLUTION SUBCUTANEOUS at 07:50

## 2019-10-23 RX ADMIN — WATER 1 G: 1 INJECTION INTRAMUSCULAR; INTRAVENOUS; SUBCUTANEOUS at 20:39

## 2019-10-23 RX ADMIN — SODIUM CHLORIDE 75 ML/HR: 9 INJECTION, SOLUTION INTRAVENOUS at 11:20

## 2019-10-23 RX ADMIN — GABAPENTIN 300 MG: 300 CAPSULE ORAL at 13:30

## 2019-10-23 RX ADMIN — PANTOPRAZOLE SODIUM 40 MG: 40 TABLET, DELAYED RELEASE ORAL at 07:50

## 2019-10-23 RX ADMIN — METOPROLOL TARTRATE 100 MG: 50 TABLET ORAL at 20:37

## 2019-10-23 RX ADMIN — CETIRIZINE HYDROCHLORIDE 10 MG: 10 TABLET, FILM COATED ORAL at 07:49

## 2019-10-23 RX ADMIN — Medication 400 MG: at 13:30

## 2019-10-23 RX ADMIN — ISOSORBIDE MONONITRATE 30 MG: 30 TABLET, EXTENDED RELEASE ORAL at 07:55

## 2019-10-23 RX ADMIN — ENOXAPARIN SODIUM 40 MG: 40 INJECTION SUBCUTANEOUS at 13:29

## 2019-10-23 RX ADMIN — OXYCODONE HYDROCHLORIDE AND ACETAMINOPHEN 1 TABLET: 10; 325 TABLET ORAL at 17:17

## 2019-10-23 ASSESSMENT — PAIN SCALES - GENERAL
PAINLEVEL_OUTOF10: 7
PAINLEVEL_OUTOF10: 8
PAINLEVEL_OUTOF10: 0
PAINLEVEL_OUTOF10: 5
PAINLEVEL_OUTOF10: 0
PAINLEVEL_OUTOF10: 0
PAINLEVEL_OUTOF10: 7
PAINLEVEL_OUTOF10: 0

## 2019-10-23 ASSESSMENT — PAIN DESCRIPTION - PROGRESSION
CLINICAL_PROGRESSION: GRADUALLY WORSENING
CLINICAL_PROGRESSION: GRADUALLY IMPROVING

## 2019-10-23 ASSESSMENT — PAIN DESCRIPTION - ORIENTATION
ORIENTATION: RIGHT;LEFT
ORIENTATION: RIGHT;LEFT

## 2019-10-23 ASSESSMENT — PAIN DESCRIPTION - DESCRIPTORS
DESCRIPTORS: ACHING
DESCRIPTORS: ACHING;DISCOMFORT;SORE

## 2019-10-23 ASSESSMENT — PAIN DESCRIPTION - LOCATION
LOCATION: SHOULDER;LEG;FOOT
LOCATION: KNEE

## 2019-10-23 ASSESSMENT — PAIN DESCRIPTION - PAIN TYPE
TYPE: ACUTE PAIN;CHRONIC PAIN
TYPE: CHRONIC PAIN

## 2019-10-23 ASSESSMENT — PAIN - FUNCTIONAL ASSESSMENT: PAIN_FUNCTIONAL_ASSESSMENT: PREVENTS OR INTERFERES SOME ACTIVE ACTIVITIES AND ADLS

## 2019-10-24 ENCOUNTER — APPOINTMENT (OUTPATIENT)
Dept: NUCLEAR MEDICINE | Age: 64
End: 2019-10-24
Payer: MEDICARE

## 2019-10-24 VITALS
WEIGHT: 293 LBS | BODY MASS INDEX: 41.02 KG/M2 | OXYGEN SATURATION: 96 % | SYSTOLIC BLOOD PRESSURE: 189 MMHG | HEART RATE: 79 BPM | RESPIRATION RATE: 20 BRPM | HEIGHT: 71 IN | DIASTOLIC BLOOD PRESSURE: 73 MMHG | TEMPERATURE: 98.1 F

## 2019-10-24 LAB
ANION GAP SERPL CALCULATED.3IONS-SCNC: 11 MMOL/L (ref 7–16)
BUN BLDV-MCNC: 17 MG/DL (ref 8–23)
CALCIUM SERPL-MCNC: 9.7 MG/DL (ref 8.6–10.2)
CHLORIDE BLD-SCNC: 98 MMOL/L (ref 98–107)
CO2: 30 MMOL/L (ref 22–29)
CREAT SERPL-MCNC: 1 MG/DL (ref 0.5–1)
GFR AFRICAN AMERICAN: >60
GFR NON-AFRICAN AMERICAN: 56 ML/MIN/1.73
GLUCOSE BLD-MCNC: 202 MG/DL (ref 74–99)
HCT VFR BLD CALC: 34.9 % (ref 34–48)
HEMOGLOBIN: 11.5 G/DL (ref 11.5–15.5)
LV EF: 62 %
LVEF MODALITY: NORMAL
MCH RBC QN AUTO: 34.1 PG (ref 26–35)
MCHC RBC AUTO-ENTMCNC: 33 % (ref 32–34.5)
MCV RBC AUTO: 103.6 FL (ref 80–99.9)
METER GLUCOSE: 172 MG/DL (ref 74–99)
METER GLUCOSE: 203 MG/DL (ref 74–99)
METER GLUCOSE: 216 MG/DL (ref 74–99)
PDW BLD-RTO: 13.2 FL (ref 11.5–15)
PLATELET # BLD: 176 E9/L (ref 130–450)
PMV BLD AUTO: 10 FL (ref 7–12)
POTASSIUM SERPL-SCNC: 4.2 MMOL/L (ref 3.5–5)
RBC # BLD: 3.37 E12/L (ref 3.5–5.5)
SODIUM BLD-SCNC: 139 MMOL/L (ref 132–146)
WBC # BLD: 7.3 E9/L (ref 4.5–11.5)

## 2019-10-24 PROCEDURE — 96372 THER/PROPH/DIAG INJ SC/IM: CPT

## 2019-10-24 PROCEDURE — G0378 HOSPITAL OBSERVATION PER HR: HCPCS

## 2019-10-24 PROCEDURE — A9500 TC99M SESTAMIBI: HCPCS | Performed by: RADIOLOGY

## 2019-10-24 PROCEDURE — 82962 GLUCOSE BLOOD TEST: CPT

## 2019-10-24 PROCEDURE — 6360000002 HC RX W HCPCS: Performed by: INTERNAL MEDICINE

## 2019-10-24 PROCEDURE — 6370000000 HC RX 637 (ALT 250 FOR IP): Performed by: INTERNAL MEDICINE

## 2019-10-24 PROCEDURE — 78452 HT MUSCLE IMAGE SPECT MULT: CPT

## 2019-10-24 PROCEDURE — 80048 BASIC METABOLIC PNL TOTAL CA: CPT

## 2019-10-24 PROCEDURE — 93017 CV STRESS TEST TRACING ONLY: CPT

## 2019-10-24 PROCEDURE — 85027 COMPLETE CBC AUTOMATED: CPT

## 2019-10-24 PROCEDURE — 3430000000 HC RX DIAGNOSTIC RADIOPHARMACEUTICAL: Performed by: RADIOLOGY

## 2019-10-24 PROCEDURE — 36415 COLL VENOUS BLD VENIPUNCTURE: CPT

## 2019-10-24 RX ADMIN — Medication 400 MG: at 14:46

## 2019-10-24 RX ADMIN — INSULIN LISPRO 4 UNITS: 100 INJECTION, SOLUTION INTRAVENOUS; SUBCUTANEOUS at 09:33

## 2019-10-24 RX ADMIN — Medication 10 MILLICURIE: at 11:18

## 2019-10-24 RX ADMIN — GABAPENTIN 300 MG: 300 CAPSULE ORAL at 14:46

## 2019-10-24 RX ADMIN — METOPROLOL TARTRATE 200 MG: 50 TABLET ORAL at 05:58

## 2019-10-24 RX ADMIN — INSULIN GLARGINE 45 UNITS: 100 INJECTION, SOLUTION SUBCUTANEOUS at 09:34

## 2019-10-24 RX ADMIN — OXYCODONE HYDROCHLORIDE AND ACETAMINOPHEN 1 TABLET: 10; 325 TABLET ORAL at 16:25

## 2019-10-24 RX ADMIN — INSULIN LISPRO 4 UNITS: 100 INJECTION, SOLUTION INTRAVENOUS; SUBCUTANEOUS at 17:14

## 2019-10-24 RX ADMIN — INSULIN LISPRO 2 UNITS: 100 INJECTION, SOLUTION INTRAVENOUS; SUBCUTANEOUS at 13:25

## 2019-10-24 RX ADMIN — OXYCODONE HYDROCHLORIDE AND ACETAMINOPHEN 1 TABLET: 10; 325 TABLET ORAL at 09:35

## 2019-10-24 RX ADMIN — REGADENOSON 0.4 MG: 0.08 INJECTION, SOLUTION INTRAVENOUS at 12:57

## 2019-10-24 RX ADMIN — ENOXAPARIN SODIUM 40 MG: 40 INJECTION SUBCUTANEOUS at 09:33

## 2019-10-24 RX ADMIN — Medication 30 MILLICURIE: at 13:00

## 2019-10-24 ASSESSMENT — PAIN DESCRIPTION - ONSET: ONSET: ON-GOING

## 2019-10-24 ASSESSMENT — PAIN - FUNCTIONAL ASSESSMENT: PAIN_FUNCTIONAL_ASSESSMENT: PREVENTS OR INTERFERES SOME ACTIVE ACTIVITIES AND ADLS

## 2019-10-24 ASSESSMENT — PAIN SCALES - GENERAL
PAINLEVEL_OUTOF10: 5
PAINLEVEL_OUTOF10: 7

## 2019-10-24 ASSESSMENT — PAIN DESCRIPTION - PAIN TYPE
TYPE: ACUTE PAIN;CHRONIC PAIN
TYPE: ACUTE PAIN;CHRONIC PAIN

## 2019-10-24 ASSESSMENT — PAIN DESCRIPTION - ORIENTATION: ORIENTATION: LOWER;RIGHT;LEFT

## 2019-10-24 ASSESSMENT — PAIN DESCRIPTION - LOCATION: LOCATION: BACK;LEG

## 2019-10-24 ASSESSMENT — PAIN DESCRIPTION - DESCRIPTORS: DESCRIPTORS: ACHING

## 2019-10-24 ASSESSMENT — PAIN DESCRIPTION - FREQUENCY: FREQUENCY: CONTINUOUS

## 2019-10-25 LAB
ORGANISM: ABNORMAL
URINE CULTURE, ROUTINE: ABNORMAL

## 2019-10-26 LAB
EKG ATRIAL RATE: 78 BPM
EKG P AXIS: 33 DEGREES
EKG P-R INTERVAL: 156 MS
EKG Q-T INTERVAL: 412 MS
EKG QRS DURATION: 82 MS
EKG QTC CALCULATION (BAZETT): 469 MS
EKG R AXIS: 0 DEGREES
EKG T AXIS: 65 DEGREES
EKG VENTRICULAR RATE: 78 BPM

## 2019-10-29 ENCOUNTER — TELEPHONE (OUTPATIENT)
Dept: PALLATIVE CARE | Age: 64
End: 2019-10-29

## 2019-10-29 ENCOUNTER — APPOINTMENT (OUTPATIENT)
Dept: GENERAL RADIOLOGY | Age: 64
End: 2019-10-29
Payer: MEDICARE

## 2019-10-29 ENCOUNTER — APPOINTMENT (OUTPATIENT)
Dept: CT IMAGING | Age: 64
End: 2019-10-29
Payer: MEDICARE

## 2019-10-29 ENCOUNTER — HOSPITAL ENCOUNTER (EMERGENCY)
Age: 64
Discharge: HOME | End: 2019-10-29
Attending: EMERGENCY MEDICINE
Payer: MEDICARE

## 2019-10-29 VITALS
HEART RATE: 88 BPM | HEIGHT: 71 IN | BODY MASS INDEX: 41.02 KG/M2 | DIASTOLIC BLOOD PRESSURE: 105 MMHG | TEMPERATURE: 99.3 F | RESPIRATION RATE: 18 BRPM | OXYGEN SATURATION: 97 % | SYSTOLIC BLOOD PRESSURE: 193 MMHG | WEIGHT: 293 LBS

## 2019-10-29 DIAGNOSIS — G62.9 POLYNEUROPATHY: ICD-10-CM

## 2019-10-29 DIAGNOSIS — R51.9 ACUTE NONINTRACTABLE HEADACHE, UNSPECIFIED HEADACHE TYPE: Primary | ICD-10-CM

## 2019-10-29 DIAGNOSIS — C95.00 ACUTE LEUKEMIA NOT HAVING ACHIEVED REMISSION (HCC): ICD-10-CM

## 2019-10-29 LAB
ANION GAP SERPL CALCULATED.3IONS-SCNC: 11 MMOL/L (ref 7–16)
BUN BLDV-MCNC: 21 MG/DL (ref 8–23)
CALCIUM SERPL-MCNC: 9.8 MG/DL (ref 8.6–10.2)
CHLORIDE BLD-SCNC: 98 MMOL/L (ref 98–107)
CO2: 32 MMOL/L (ref 22–29)
CREAT SERPL-MCNC: 1 MG/DL (ref 0.5–1)
GFR AFRICAN AMERICAN: >60
GFR NON-AFRICAN AMERICAN: 56 ML/MIN/1.73
GLUCOSE BLD-MCNC: 242 MG/DL (ref 74–99)
HCT VFR BLD CALC: 32.9 % (ref 34–48)
HEMOGLOBIN: 11 G/DL (ref 11.5–15.5)
MCH RBC QN AUTO: 33.8 PG (ref 26–35)
MCHC RBC AUTO-ENTMCNC: 33.4 % (ref 32–34.5)
MCV RBC AUTO: 101.2 FL (ref 80–99.9)
PDW BLD-RTO: 13.5 FL (ref 11.5–15)
PLATELET # BLD: 165 E9/L (ref 130–450)
PMV BLD AUTO: 9.6 FL (ref 7–12)
POTASSIUM REFLEX MAGNESIUM: 4.1 MMOL/L (ref 3.5–5)
RBC # BLD: 3.25 E12/L (ref 3.5–5.5)
SODIUM BLD-SCNC: 141 MMOL/L (ref 132–146)
TROPONIN: 0.03 NG/ML (ref 0–0.03)
WBC # BLD: 6.7 E9/L (ref 4.5–11.5)

## 2019-10-29 PROCEDURE — 93005 ELECTROCARDIOGRAM TRACING: CPT | Performed by: STUDENT IN AN ORGANIZED HEALTH CARE EDUCATION/TRAINING PROGRAM

## 2019-10-29 PROCEDURE — 96375 TX/PRO/DX INJ NEW DRUG ADDON: CPT

## 2019-10-29 PROCEDURE — 96374 THER/PROPH/DIAG INJ IV PUSH: CPT

## 2019-10-29 PROCEDURE — 99285 EMERGENCY DEPT VISIT HI MDM: CPT

## 2019-10-29 PROCEDURE — 80048 BASIC METABOLIC PNL TOTAL CA: CPT

## 2019-10-29 PROCEDURE — 70450 CT HEAD/BRAIN W/O DYE: CPT

## 2019-10-29 PROCEDURE — 71046 X-RAY EXAM CHEST 2 VIEWS: CPT

## 2019-10-29 PROCEDURE — 6360000002 HC RX W HCPCS: Performed by: STUDENT IN AN ORGANIZED HEALTH CARE EDUCATION/TRAINING PROGRAM

## 2019-10-29 PROCEDURE — 84484 ASSAY OF TROPONIN QUANT: CPT

## 2019-10-29 PROCEDURE — 85027 COMPLETE CBC AUTOMATED: CPT

## 2019-10-29 PROCEDURE — 36415 COLL VENOUS BLD VENIPUNCTURE: CPT

## 2019-10-29 RX ORDER — OXYCODONE AND ACETAMINOPHEN 10; 325 MG/1; MG/1
1 TABLET ORAL EVERY 6 HOURS PRN
Qty: 120 TABLET | Refills: 0 | Status: SHIPPED | OUTPATIENT
Start: 2019-10-29 | End: 2019-11-26 | Stop reason: SDUPTHER

## 2019-10-29 RX ORDER — DIPHENHYDRAMINE HYDROCHLORIDE 50 MG/ML
25 INJECTION INTRAMUSCULAR; INTRAVENOUS ONCE
Status: COMPLETED | OUTPATIENT
Start: 2019-10-29 | End: 2019-10-29

## 2019-10-29 RX ORDER — KETOROLAC TROMETHAMINE 30 MG/ML
30 INJECTION, SOLUTION INTRAMUSCULAR; INTRAVENOUS ONCE
Status: COMPLETED | OUTPATIENT
Start: 2019-10-29 | End: 2019-10-29

## 2019-10-29 RX ORDER — METOCLOPRAMIDE HYDROCHLORIDE 5 MG/ML
10 INJECTION INTRAMUSCULAR; INTRAVENOUS ONCE
Status: COMPLETED | OUTPATIENT
Start: 2019-10-29 | End: 2019-10-29

## 2019-10-29 RX ADMIN — METOCLOPRAMIDE 10 MG: 5 INJECTION, SOLUTION INTRAMUSCULAR; INTRAVENOUS at 13:53

## 2019-10-29 RX ADMIN — DIPHENHYDRAMINE HYDROCHLORIDE 25 MG: 50 INJECTION INTRAMUSCULAR; INTRAVENOUS at 13:53

## 2019-10-29 RX ADMIN — KETOROLAC TROMETHAMINE 30 MG: 30 INJECTION, SOLUTION INTRAMUSCULAR at 15:20

## 2019-10-29 ASSESSMENT — ENCOUNTER SYMPTOMS
CHEST TIGHTNESS: 0
ABDOMINAL DISTENTION: 0
VOMITING: 0
COLOR CHANGE: 1
SHORTNESS OF BREATH: 1
ABDOMINAL PAIN: 0
NAUSEA: 1
WHEEZING: 0

## 2019-10-29 ASSESSMENT — PAIN SCALES - GENERAL: PAINLEVEL_OUTOF10: 7

## 2019-10-31 LAB
EKG ATRIAL RATE: 79 BPM
EKG P AXIS: 45 DEGREES
EKG P-R INTERVAL: 130 MS
EKG Q-T INTERVAL: 378 MS
EKG QRS DURATION: 72 MS
EKG QTC CALCULATION (BAZETT): 433 MS
EKG R AXIS: -5 DEGREES
EKG T AXIS: 51 DEGREES
EKG VENTRICULAR RATE: 79 BPM

## 2019-10-31 PROCEDURE — 93010 ELECTROCARDIOGRAM REPORT: CPT | Performed by: INTERNAL MEDICINE

## 2019-11-08 ENCOUNTER — OFFICE VISIT (OUTPATIENT)
Dept: PALLATIVE CARE | Age: 64
End: 2019-11-08
Payer: MEDICARE

## 2019-11-08 VITALS — DIASTOLIC BLOOD PRESSURE: 94 MMHG | SYSTOLIC BLOOD PRESSURE: 168 MMHG | HEART RATE: 97 BPM | OXYGEN SATURATION: 97 %

## 2019-11-08 DIAGNOSIS — Z51.5 PALLIATIVE CARE BY SPECIALIST: Primary | ICD-10-CM

## 2019-11-08 DIAGNOSIS — R53.83 FATIGUE, UNSPECIFIED TYPE: ICD-10-CM

## 2019-11-08 DIAGNOSIS — C95.00 ACUTE LEUKEMIA NOT HAVING ACHIEVED REMISSION (HCC): ICD-10-CM

## 2019-11-08 DIAGNOSIS — G62.9 POLYNEUROPATHY: ICD-10-CM

## 2019-11-08 PROCEDURE — 99349 HOME/RES VST EST MOD MDM 40: CPT | Performed by: NURSE PRACTITIONER

## 2019-11-08 RX ORDER — GABAPENTIN 400 MG/1
400 CAPSULE ORAL 3 TIMES DAILY
Qty: 270 CAPSULE | Refills: 0 | Status: SHIPPED | OUTPATIENT
Start: 2019-11-08 | End: 2020-01-17 | Stop reason: SDUPTHER

## 2019-11-08 RX ORDER — ONDANSETRON 8 MG/1
8 TABLET, ORALLY DISINTEGRATING ORAL EVERY 8 HOURS PRN
Qty: 90 TABLET | Refills: 2 | Status: SHIPPED | OUTPATIENT
Start: 2019-11-08 | End: 2020-01-21 | Stop reason: SDUPTHER

## 2019-11-18 ENCOUNTER — TELEPHONE (OUTPATIENT)
Dept: PALLATIVE CARE | Age: 64
End: 2019-11-18

## 2019-11-21 PROBLEM — R77.8 ELEVATED TROPONIN: Status: RESOLVED | Noted: 2019-10-22 | Resolved: 2019-11-21

## 2019-11-21 PROBLEM — R79.89 ELEVATED TROPONIN: Status: RESOLVED | Noted: 2019-10-22 | Resolved: 2019-11-21

## 2019-11-26 DIAGNOSIS — G62.9 POLYNEUROPATHY: ICD-10-CM

## 2019-11-26 DIAGNOSIS — C95.00 ACUTE LEUKEMIA NOT HAVING ACHIEVED REMISSION (HCC): ICD-10-CM

## 2019-11-26 RX ORDER — OXYCODONE AND ACETAMINOPHEN 10; 325 MG/1; MG/1
1 TABLET ORAL EVERY 6 HOURS PRN
Qty: 120 TABLET | Refills: 0 | Status: SHIPPED | OUTPATIENT
Start: 2019-11-26 | End: 2019-12-26 | Stop reason: SDUPTHER

## 2019-12-06 ENCOUNTER — OFFICE VISIT (OUTPATIENT)
Dept: PALLATIVE CARE | Age: 64
End: 2019-12-06
Payer: MEDICARE

## 2019-12-06 DIAGNOSIS — R53.83 FATIGUE, UNSPECIFIED TYPE: ICD-10-CM

## 2019-12-06 DIAGNOSIS — C95.00 ACUTE LEUKEMIA NOT HAVING ACHIEVED REMISSION (HCC): ICD-10-CM

## 2019-12-06 DIAGNOSIS — Z51.5 PALLIATIVE CARE BY SPECIALIST: Primary | ICD-10-CM

## 2019-12-06 DIAGNOSIS — G62.9 POLYNEUROPATHY: ICD-10-CM

## 2019-12-06 PROCEDURE — 99350 HOME/RES VST EST HIGH MDM 60: CPT | Performed by: NURSE PRACTITIONER

## 2019-12-10 ENCOUNTER — TELEPHONE (OUTPATIENT)
Dept: PALLATIVE CARE | Age: 64
End: 2019-12-10

## 2019-12-26 DIAGNOSIS — G62.9 POLYNEUROPATHY: ICD-10-CM

## 2019-12-26 DIAGNOSIS — C95.00 ACUTE LEUKEMIA NOT HAVING ACHIEVED REMISSION (HCC): ICD-10-CM

## 2019-12-26 RX ORDER — OXYCODONE AND ACETAMINOPHEN 10; 325 MG/1; MG/1
1 TABLET ORAL EVERY 6 HOURS PRN
Qty: 120 TABLET | Refills: 0 | Status: SHIPPED | OUTPATIENT
Start: 2019-12-26 | End: 2020-01-21 | Stop reason: SDUPTHER

## 2020-01-17 ENCOUNTER — OFFICE VISIT (OUTPATIENT)
Dept: PALLATIVE CARE | Age: 65
End: 2020-01-17
Payer: MEDICARE

## 2020-01-17 VITALS
RESPIRATION RATE: 14 BRPM | DIASTOLIC BLOOD PRESSURE: 76 MMHG | HEART RATE: 97 BPM | OXYGEN SATURATION: 98 % | SYSTOLIC BLOOD PRESSURE: 124 MMHG

## 2020-01-17 PROCEDURE — 99348 HOME/RES VST EST LOW MDM 30: CPT | Performed by: NURSE PRACTITIONER

## 2020-01-17 RX ORDER — GABAPENTIN 400 MG/1
400 CAPSULE ORAL 3 TIMES DAILY
Qty: 270 CAPSULE | Refills: 3 | Status: ON HOLD
Start: 2020-01-17 | End: 2020-11-20 | Stop reason: HOSPADM

## 2020-01-17 NOTE — PROGRESS NOTES
Palliative Medicine Outpatient Visit  2020  Provider: Racquel TRAORE    Referring Provider: Dr Jaleel Mcdaniels    Reason for Consult:  [] [address]  [x] Assist with goals of care  [] Transition of care  [x] Symptom Management    See below for recommendations, as indicated, concernin. Advanced Care Planning  2. Anticipatory Guidance  3. Transition of Care  4. Symptom Management      CC: Neuropathy, fatigue, diarrhea, nausea, insomnia    HPI:  As per medical oncology assessment from Alanis 10, 2019:  The patient is a pleasant 70-year-old lady with a past medical history significant for hypertension, type II diabetes mellitus, hyperlipidemia, coronary artery disease, status post stents placement, morbid obesity, who had presented to [facility] for his near syncopal episode, weakness and profound fatigue, she was found to have a white count of 31.3, hemoglobin was 5.8, platelet count 73,074, she was transferred to [facility] with concern for acute leukemia, she had a bone marrow biopsy done on 2018, 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 NGS panel- IDH1, SYVH85, CBL slice site. Due to the patient's D: Addition to this intake, requiring significant assistance with mobility without ECOG 2-3, the patient was treated with azacitidine 75 mg/m² ×7 day course between  and 12/3/2018, no tumor lysis was observed, she was on allopurinol for TLS prophylaxis. She was admitted from  for standard induction with idarubicin and cytarabine on 7+3 schedule.   Her chemotherapy course overall went well,was complicated by hyperglycemia. She was admitted 2019 for influenza and pneumonia. She had a bone marrow biopsy on 2019 confirms complete remission by histology,flow and FISH studies.    She received consolidation cycle #1 given  - with modified high dose cytarabine.   The patient had presented to (with meals) 1 vial 3    insulin lispro (HUMALOG) 100 UNIT/ML injection vial Inject 0-9 Units into the skin nightly 1 vial 3    Skin Protectants, Misc. (ALOE VESTA PROTECTIVE) OINT ointment Apply 1 drop topically 2 times daily as needed (dry skin) 1 Tube 0    insulin glargine (LANTUS) 100 UNIT/ML injection vial Inject 45 Units into the skin every morning      loratadine (CLARITIN) 10 MG tablet Take 10 mg by mouth daily      magnesium oxide (MAG-OX) 400 MG tablet Take 400 mg by mouth 3 times daily      fluconazole (DIFLUCAN) 200 MG tablet Take 2 tablets by mouth daily 30 tablet 0    acyclovir (ZOVIRAX) 400 MG tablet Take 400 mg by mouth 2 times daily      pantoprazole (PROTONIX) 40 MG tablet Take 40 mg by mouth daily      metoprolol (LOPRESSOR) 100 MG tablet Take 100 mg by mouth 2 times daily      metFORMIN (GLUCOPHAGE) 1000 MG tablet Take 1,000 mg by mouth 2 times daily (with meals)      isosorbide mononitrate (IMDUR) 30 MG extended release tablet Take 30 mg by mouth daily      losartan (COZAAR) 100 MG tablet Take 100 mg by mouth daily       No current facility-administered medications on file prior to visit. Allergies:    Patient has no known allergies. ROS: UNLESS STATED ABOVE PATIENT DENIES:  CONSTITUTIONAL:  fever, chill, rigors, nausea, vomiting, fatigue. HEENT: blurry vision, double vision, hearing problem, tinnitus, hoarseness, dysphagia,               odynophagia  RESPIRATORY: cough, shortness of breath, sputum expectoration. CARDIOVASCULAR:  Chest pain/pressure, palpitation, syncope, irregular beats  GASTROINTESTINAL:  abdominal or rectal pain, diarrhea, constipation, . GENITOURINARY:  Burning, frequency, urgency, incontinence, discharge  INTEGUMENTARY: rash, wound, pruritis  HEMATOLOGIC/LYMPHATIC:  Swelling, sores, gum bleeding, easy bruising, pica.   MUSCULOSKELETAL:  pain, edema, joint swelling or redness  NEUROLOGICAL:  light headed, dizziness, loss of consciousness, weakness, change                                   in memory, seizures, tremors    Social history:  Social History     Socioeconomic History    Marital status:      Spouse name: Not on file    Number of children: Not on file    Years of education: Not on file    Highest education level: Not on file   Occupational History    Not on file   Social Needs    Financial resource strain: Not on file    Food insecurity:     Worry: Not on file     Inability: Not on file    Transportation needs:     Medical: Not on file     Non-medical: Not on file   Tobacco Use    Smoking status: Never Smoker    Smokeless tobacco: Never Used   Substance and Sexual Activity    Alcohol use: No    Drug use: No    Sexual activity: Not Currently     Partners: Male   Lifestyle    Physical activity:     Days per week: Not on file     Minutes per session: Not on file    Stress: Not on file   Relationships    Social connections:     Talks on phone: Not on file     Gets together: Not on file     Attends Latter day service: Not on file     Active member of club or organization: Not on file     Attends meetings of clubs or organizations: Not on file     Relationship status: Not on file    Intimate partner violence:     Fear of current or ex partner: Not on file     Emotionally abused: Not on file     Physically abused: Not on file     Forced sexual activity: Not on file   Other Topics Concern    Not on file   Social History Narrative    Not on file         Family history:  Family History   Problem Relation Age of Onset    Coronary Art Dis Mother     Stroke Mother     Diabetes Mother     Coronary Art Dis Father     Diabetes Father     Diabetes Sister     Cancer Paternal Aunt         Bone       PE:  Gen: WD, WN, NAD, awake, alert, able to voice their needs/thoughts   HEENT: normocephalic, atraumatic, sclera nonicteric, PERRLA, EOMI, MMM, good speech tone and quality  Neck: no LAD, no JVD, supple, no masses, normal speech, trachea midline,   Lungs: bilaterally clear to auscultation, good aeration, symmetric  Heart: regular rate and rhythm, no murmurs/rubs/gallops/ectopy appreciated, PMI WNL  Abd.: non-distended, soft, nontender, non-distended, normal bowel sounds  Ext.: no clubbing, cyanosis or edema, no joint tenderness  Skin: warm, adequate hydration without acute lesions  Neuro: awake, alert, oriented x 3, conversive,     [place] Symptom Assessment Score   [place] Score 11/8/2019 8/29/2019 6/13/2019   Pain Score 6 6 8   Tiredness Score 4 7 5   Nausea Score 3 1 4   Depression Score 5 Not depressed Not depressed   Anxiety Score 2 Not anxious 3   Drowsiness Score 2 5 3   Appetite Score 2 Best appetite Best appetite   Wellbeing Score 5 2 2   Dyspnea Score 4 No shortness of breath No shortness of breath   Other Problem Score Best possible response 5 Best possible response   Total Assessment Score(calculated) 33 26 25       Impression:  As per medical oncology assessment from Alanis 10, 2019:  The patient is a pleasant 29-year-old lady with a past medical history significant for hypertension, type II diabetes mellitus, hyperlipidemia, coronary artery disease, status post stents placement, morbid obesity, who had presented to 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 NGS panel- IDH1, [id number], CBL slice site. Due to the patient's D: Addition to this intake, requiring significant assistance with mobility without ECOG 2-3, the patient was treated with azacitidine 75 mg/m² ×7 day course between 1127 and 12/3/2018, no tumor lysis was observed, she was on allopurinol for TLS prophylaxis.   She was admitted from 1/21/19-02/12/2019 for standard induction with idarubicin and cytarabine on 7+3 schedule.   Her chemotherapy course overall went well,was complicated by hyperglycemia. She was admitted 02/28/2019 for influenza and pneumonia. She had a bone marrow biopsy on 03/11/2019 confirms complete remission by histology,flow and FISH studies.    She received consolidation cycle #1 given 04/22 -04/27 with modified high dose cytarabine. The patient had presented to [facility] with an altered mental status, she was found to be septic, was transferred to [facility], she had bacteremia and post infection, which has been removed, she completed yesterday 6/5/2019 the course of ceftriaxone. She was seen by Dr. Lisa Cano, she will receive Vidaza rather than high dose ranulfo-C due to the severe myelosuppression she had with the last treatment. Labs reviewed, white count is 3.5, ANC is 1680, hemoglobin is 9.1, hematocrit 7.6, platelet count 11,679, had overall improved. No need for blood products transfusion today. Records have been requested from [address] [place] [address]. Patient prefers to receive the Vidaza locally, await records from Dr. Canales Books office. Referred patient to [address] [place] [address] for symptoms management. The patient will be referred to the wound clinic. RTC in 1 week. June 13, 2019:  Medical records reviewed and patient presents in a wheelchair accompanied by her  in no acute distress without sign of sedation and/or confusion, pleasant able to voice her needs. Patient introduced to palliative care, signed an opiate agreement and we are sending a UDS. Patient adamantly denies drug use. Patient states that her #1 symptom is pain to her arms, legs and joints, hands describing neuropathy and also weakness. Patient states that she has tried Tylenol without avail, Cymbalta made her \"feel spacey and have weird dreams\". Patient from [facility] prescribed Percocet 7.5-325 mg tablets that she states works well if she takes 3 times daily. Patient denies oversedation from this. Patient states she has not taken anything else for her pain. Patient states that fatigue is an issue, not sleeping well at night and also very infrequent nausea in which she has Zofran that works well at home. Patient also has loose pasty bowel movements approximately 4 times daily not taking anything for it. Options were discussed and again we are sending a UDS. Today we prescribed unchanged Percocet 7.5-325 mg tablets 1 p.o. every 6 hours as needed prescribing 90 tablets that she takes them 3 times daily. We also educated her and initiated Neurontin 300 mg initially nightly x5 days then twice daily x5 days then 3 times daily. Patient advised that if she experiences sedation from the medication to call us immediately and this may inhibit the titration accordingly. Cymbalta should not be used secondary to voiced side effects as above. Other options may be Pamelor or methadone once gabapentin is titrated unless contraindicated. I also advised patient to use Imodium as needed for her symptoms of diarrhea. Patient will continue Zofran for her infrequent nausea. We will see her back in 4 weeks or sooner if she needs us.  8/29/2019:  Medical records reviewed. Mr.s Daisy Sommer is seen in her home today, and I am accompanied by the PM LSW. She continues to have pain, rating it as a 6/10, and primarily being neuropathic in nature. She continues to use Gabapentin 300 mg TID and Percocet  mg, ordered Q 6 PRN, and using 3-4 times per day. She continues to have issues with her mobility, and she has [address] with PT/OT following her now. She denies any other significant symptoms. Options were discussed, and we will continue her Percocet unchanged ordering  mg Q 6 PRN, instructing her to decrease  Her use as pain improves.   We will continue Gabapentin with instructions to slowly increase by 100 mg every week for the next 4 weeks with plan for her to be taking 400 mg TID medications, it appears as though she would greatly benefit from ongoing home health, for ongoing medication compliance monitoring, blood pressure monitoring, as well as blood sugar monitoring, as well as potential further physical and occupational therapy to improve her mobility. At this time we will otherwise not make any current changes, will continue to follow closely provide ongoing support and assistance, and symptom management as appropriate. 1/17/2020:  Macario Santoyo is seen in her home today, with her  present. She is alert, oriented, able voice needs concerns well, does not show any signs sedation, confusion, or any acute distress. She is observed ambulating fairly well utilizing her Rollator walker within her home, and overall she states that she is doing very well. She does continue to have pain, primarily in her back, and her bilateral lower extremities, primarily described as a constant ache, as well as shooting pains down her legs associated with numbness and tingling. She continues to utilize Percocet 10-3 to 25 mg ordered every 6 PRN, which she typically uses 2-3 times per day. She additionally continues to utilize gabapentin 4 mg 3 times daily. She states that her nausea is improved, with only a few episodes per week, and well managed utilizing Zofran. She denies any issues with constipation, and continues to utilize Senokot as ordered. Overall she is very pleased with how she is doing, denies any immediate needs the palliative medicine team.  She did have her ramp installed last week, but has been unable to make an appointment with her primary care provider, as shortly after the ramp is installed her  was hospitalized due to a dislocated hip, and he is just recently returned home. They do plan to contact the primary care provider, and she discusses her desire to establishing a new primary care provider, primarily [facility] of which her niece works with.   She is encouraged to make a call to this office today to make this appointment, so that there is no delay in care in the management of her chronic illnesses. She states she understands this and will be seeking an appointment to establish care very soon. Additionally she is encouraged to make follow-up appointment with Dr. Obinna Mejia regarding follow-up for her leukemia, and she states that she hopes to do so very soon. I also discussed with her, that we could perhaps be able to coordinate her next appointment with Dr. Obinna Mejia so is that she may be seen again in our outpatient clinic at the [facility]. She states that this would be a good plan, and would be agreeable to this. I will have our nurse try to coordinate with the oncology office to accomplish a coordinated visit between oncology and palliative medicine for her next encounter. Otherwise she does not require a refill of her Percocet today, we will provide a refill of her gabapentin, and she has no further needs. We will make no changes to her plan of care, and will plan to follow-up with her in approximately 4 to 6 weeks, again hoping to coordinate this visit with oncology in the outpatient setting. Only she is encouraged to call she has any questions, concerns, or changes in her symptoms, or if she requires any refills prior to her next encounter. Controlled Substance Monitoring: [place] reviewed 1/17/20  Acute and Chronic Pain Monitoring:   RX Monitoring 8/29/2019   Periodic Controlled Substance Monitoring Possible medication side effects, risk of tolerance/dependence & alternative treatments discussed. ;No signs of potential drug abuse or diversion identified. ;Assessed functional status. ;Obtaining appropriate analgesic effect of treatment. Chronic Pain > 50 MEDD Re-evaluated the status of the patient's underlying condition causing pain.;Obtained or confirmed \"Consent for Opioid Use\" on file.        Time/Communication  Greater than 50% of time spent, total 30 minutes in face-to-face counseling and coordination of care regarding goals of care, symptom management, diagnosis and prognosis and see above. Deb MCMAHAN-CNP  1/17/2020    Note: This report was completed using [employer] voice recognition software. Every effort has been made to ensure accuracy; however, inadvertent computerized transcription errors may be present.

## 2020-01-21 RX ORDER — OXYCODONE AND ACETAMINOPHEN 10; 325 MG/1; MG/1
1 TABLET ORAL EVERY 6 HOURS PRN
Qty: 120 TABLET | Refills: 0 | Status: SHIPPED
Start: 2020-01-21 | End: 2020-02-20 | Stop reason: SDUPTHER

## 2020-01-21 RX ORDER — ONDANSETRON 8 MG/1
8 TABLET, ORALLY DISINTEGRATING ORAL EVERY 8 HOURS PRN
Qty: 90 TABLET | Refills: 2 | Status: SHIPPED | OUTPATIENT
Start: 2020-01-21 | End: 2020-04-20

## 2020-02-20 ENCOUNTER — OFFICE VISIT (OUTPATIENT)
Dept: PALLATIVE CARE | Age: 65
End: 2020-02-20
Payer: COMMERCIAL

## 2020-02-20 ENCOUNTER — HOSPITAL ENCOUNTER (OUTPATIENT)
Dept: INFUSION THERAPY | Age: 65
Discharge: HOME | End: 2020-02-20
Payer: COMMERCIAL

## 2020-02-20 ENCOUNTER — OFFICE VISIT (OUTPATIENT)
Dept: ONCOLOGY | Age: 65
End: 2020-02-20
Payer: COMMERCIAL

## 2020-02-20 VITALS — SYSTOLIC BLOOD PRESSURE: 150 MMHG | DIASTOLIC BLOOD PRESSURE: 75 MMHG | OXYGEN SATURATION: 95 % | HEART RATE: 78 BPM

## 2020-02-20 VITALS — SYSTOLIC BLOOD PRESSURE: 150 MMHG | OXYGEN SATURATION: 98 % | DIASTOLIC BLOOD PRESSURE: 75 MMHG | HEART RATE: 75 BPM

## 2020-02-20 DIAGNOSIS — C95.00 ACUTE LEUKEMIA NOT HAVING ACHIEVED REMISSION (HCC): ICD-10-CM

## 2020-02-20 LAB
ALBUMIN SERPL-MCNC: 3.5 G/DL (ref 3.5–5.2)
ALP BLD-CCNC: 147 U/L (ref 35–104)
ALT SERPL-CCNC: 20 U/L (ref 0–32)
ANION GAP SERPL CALCULATED.3IONS-SCNC: 9 MMOL/L (ref 7–16)
AST SERPL-CCNC: 16 U/L (ref 0–31)
BASOPHILS ABSOLUTE: 0.03 E9/L (ref 0–0.2)
BASOPHILS RELATIVE PERCENT: 0.4 % (ref 0–2)
BILIRUB SERPL-MCNC: <0.2 MG/DL (ref 0–1.2)
BUN BLDV-MCNC: 34 MG/DL (ref 8–23)
CALCIUM SERPL-MCNC: 9.7 MG/DL (ref 8.6–10.2)
CHLORIDE BLD-SCNC: 101 MMOL/L (ref 98–107)
CO2: 27 MMOL/L (ref 22–29)
CREAT SERPL-MCNC: 1 MG/DL (ref 0.5–1)
EOSINOPHILS ABSOLUTE: 0.68 E9/L (ref 0.05–0.5)
EOSINOPHILS RELATIVE PERCENT: 9.2 % (ref 0–6)
GFR AFRICAN AMERICAN: >60
GFR NON-AFRICAN AMERICAN: 56 ML/MIN/1.73
GLUCOSE BLD-MCNC: 313 MG/DL (ref 74–99)
HCT VFR BLD CALC: 33.2 % (ref 34–48)
HEMOGLOBIN: 11.3 G/DL (ref 11.5–15.5)
IMMATURE GRANULOCYTES #: 0.05 E9/L
IMMATURE GRANULOCYTES %: 0.7 % (ref 0–5)
LACTATE DEHYDROGENASE: 241 U/L (ref 135–214)
LYMPHOCYTES ABSOLUTE: 1.25 E9/L (ref 1.5–4)
LYMPHOCYTES RELATIVE PERCENT: 17 % (ref 20–42)
MCH RBC QN AUTO: 33.9 PG (ref 26–35)
MCHC RBC AUTO-ENTMCNC: 34 % (ref 32–34.5)
MCV RBC AUTO: 99.7 FL (ref 80–99.9)
MONOCYTES ABSOLUTE: 0.68 E9/L (ref 0.1–0.95)
MONOCYTES RELATIVE PERCENT: 9.2 % (ref 2–12)
NEUTROPHILS ABSOLUTE: 4.68 E9/L (ref 1.8–7.3)
NEUTROPHILS RELATIVE PERCENT: 63.5 % (ref 43–80)
PDW BLD-RTO: 12 FL (ref 11.5–15)
PLATELET # BLD: 175 E9/L (ref 130–450)
PMV BLD AUTO: 9.4 FL (ref 7–12)
POTASSIUM SERPL-SCNC: 5.2 MMOL/L (ref 3.5–5)
RBC # BLD: 3.33 E12/L (ref 3.5–5.5)
SODIUM BLD-SCNC: 137 MMOL/L (ref 132–146)
TOTAL PROTEIN: 6.9 G/DL (ref 6.4–8.3)
WBC # BLD: 7.4 E9/L (ref 4.5–11.5)

## 2020-02-20 PROCEDURE — 99212 OFFICE O/P EST SF 10 MIN: CPT

## 2020-02-20 PROCEDURE — 1036F TOBACCO NON-USER: CPT | Performed by: NURSE PRACTITIONER

## 2020-02-20 PROCEDURE — G8417 CALC BMI ABV UP PARAM F/U: HCPCS | Performed by: INTERNAL MEDICINE

## 2020-02-20 PROCEDURE — G8427 DOCREV CUR MEDS BY ELIG CLIN: HCPCS | Performed by: INTERNAL MEDICINE

## 2020-02-20 PROCEDURE — G8484 FLU IMMUNIZE NO ADMIN: HCPCS | Performed by: INTERNAL MEDICINE

## 2020-02-20 PROCEDURE — G8417 CALC BMI ABV UP PARAM F/U: HCPCS | Performed by: NURSE PRACTITIONER

## 2020-02-20 PROCEDURE — 1036F TOBACCO NON-USER: CPT | Performed by: INTERNAL MEDICINE

## 2020-02-20 PROCEDURE — 3017F COLORECTAL CA SCREEN DOC REV: CPT | Performed by: NURSE PRACTITIONER

## 2020-02-20 PROCEDURE — 99214 OFFICE O/P EST MOD 30 MIN: CPT | Performed by: INTERNAL MEDICINE

## 2020-02-20 PROCEDURE — 36415 COLL VENOUS BLD VENIPUNCTURE: CPT

## 2020-02-20 PROCEDURE — 99214 OFFICE O/P EST MOD 30 MIN: CPT | Performed by: NURSE PRACTITIONER

## 2020-02-20 PROCEDURE — G8428 CUR MEDS NOT DOCUMENT: HCPCS | Performed by: NURSE PRACTITIONER

## 2020-02-20 PROCEDURE — 83615 LACTATE (LD) (LDH) ENZYME: CPT

## 2020-02-20 PROCEDURE — 80053 COMPREHEN METABOLIC PANEL: CPT

## 2020-02-20 PROCEDURE — G8484 FLU IMMUNIZE NO ADMIN: HCPCS | Performed by: NURSE PRACTITIONER

## 2020-02-20 PROCEDURE — 99212 OFFICE O/P EST SF 10 MIN: CPT | Performed by: NURSE PRACTITIONER

## 2020-02-20 PROCEDURE — 85025 COMPLETE CBC W/AUTO DIFF WBC: CPT

## 2020-02-20 PROCEDURE — 3017F COLORECTAL CA SCREEN DOC REV: CPT | Performed by: INTERNAL MEDICINE

## 2020-02-20 RX ORDER — OXYCODONE AND ACETAMINOPHEN 10; 325 MG/1; MG/1
1 TABLET ORAL EVERY 6 HOURS PRN
Qty: 120 TABLET | Refills: 0 | Status: SHIPPED
Start: 2020-02-20 | End: 2020-03-23 | Stop reason: SDUPTHER

## 2020-02-20 RX ORDER — DOCUSATE SODIUM 100 MG/1
100 CAPSULE, LIQUID FILLED ORAL 2 TIMES DAILY
Qty: 60 CAPSULE | Refills: 0 | Status: SHIPPED | OUTPATIENT
Start: 2020-02-20 | End: 2020-03-21

## 2020-02-20 NOTE — PROGRESS NOTES
[address] [facility] [address] [id number]  Dept: [phone]  Loc: [phone]  Attending progress Note       Reason for Visit:   AML.    Referring Physician: Dr. uLis Manuel Mcneill (C/Wali Anaya).     PCP:  Magdy Dixon MD     History of Present Illness:      The patient is a pleasant 80-year-old lady with a past medical history significant for hypertension, type II diabetes mellitus, hyperlipidemia, coronary artery disease, status post stents placement, morbid obesity, who had presented to [facility] for his near syncopal episode, weakness and profound fatigue, she was found to have a white count of 31.3, hemoglobin was 5.8, platelet count 13,260, she was transferred to [facility] with concern for acute leukemia, she had a bone marrow biopsy done on 11/23/2018, 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 NGS panel- IDH1, CUCH15, CBL slice site. Due to the patient's D: Addition to this intake, requiring significant assistance with mobility without ECOG 2-3, the patient was treated with azacitidine 75 mg/m² ×7 day course between 1127 and 12/3/2018, no tumor lysis was observed, she was on allopurinol for TLS prophylaxis, and acyclovir the year and the voriconazole for infection prophylaxis while inpatient, she was discharged on acyclovir, fluconazole and ciprofloxacin, which she has been taking. The patient is feeling tired, no chest pain or shortness of breath, no bleeding. She has lower leg edema, she had venous ultrasounds done they were negative for DVT, she also has history of lower extremities ulcers, she was referred to the wound clinic at [facility].      The patient saw Dr. Malick Chacon at [facility] on 12/31/2018, and had a BM bx done on 1/3.   She saw Dr. Evan Hernández on 1/10, the blasts in the bone marrow had decreased to 4%, she will be seen again at [facility] on Thursday, plan for admission on Monday, 1/21/2019 for induction chemotherapy with 7+3.     She was admitted from 1/21/19-02/12/2019 for standard induction with idarubicin and cytarabine on 7+3 schedule.      Her chemotherapy course overall went well,was complicated by hyperglycemia. She was admitted 02/28/2019 for influenza and pneumonia.      She had a bone marrow biopsy on 03/11/2019 confirms complete remission by histology,flow and FISH studies. She received consolidation cycle #1 given 04/22 -04/27 with modified high dose cytarabine.      She was seen by Dr. Hailey Gonzalez, she was recommended Vidaza, the plan was to start it in June locally, however the patient did not follow-up since then. She did not have a ramp at her house until recently, was not able to go for her doctor's appointments. She denies any night sweats, fever, unexplained weight loss. Review of Systems;  CONSTITUTIONAL: No fever, chills. Good appetite, improved energy level. ENMT: Eyes: No diplopia; Nose: No epistaxis. Mouth: No sore throat. RESPIRATORY: No hemoptysis, shortness of breath, cough. CARDIOVASCULAR: No chest pain, palpitations. GASTROINTESTINAL: No nausea/vomiting, abdominal pain ,no diarrhea, no constipation. GENITOURINARY: No dysuria, urinary frequency, hematuria. NEURO: No syncope, presyncope, headache.   Remainder:  ROS NEGATIVE     Past Medical History:  Past Medical History             Diagnosis Date    CAD (coronary artery disease)      [facility]      GERD (gastroesophageal reflux disease)      Hyperlipidemia      Hypertension      Obesity      Osteoarthritis      Type 2 diabetes mellitus without complication ([facility]               Patient Active Problem List   Diagnosis    Acute leukemia not having achieved remission ([address]    Generalized weakness    Sepsis ([facility]         Past Surgical History:  Past Surgical History             Procedure Laterality Date    CARDIAC CATHETERIZATION        CHOLECYSTECTOMY        DILATION AND CURETTAGE OF UTERUS        FOOT SURGERY Left              Family History:  Family History         Family History   Problem Relation Age of Onset    Coronary Art Dis Mother      Stroke Mother      Diabetes Mother      Coronary Art Dis Father      Diabetes Father      Diabetes Sister      Cancer Paternal Aunt           Bone            Medications:  Reviewed and reconciled.     Social History:  Social History               Socioeconomic History    Marital status:        Spouse name: Not on file    Number of children: Not on file    Years of education: Not on file    Highest education level: Not on file   Occupational History    Not on file   Social Needs    Financial resource strain: Not on file    Food insecurity:       Worry: Not on file       Inability: Not on file    Transportation needs:       Medical: Not on file       Non-medical: Not on file   Tobacco Use    Smoking status: Never Smoker    Smokeless tobacco: Never Used   Substance and Sexual Activity    Alcohol use: No    Drug use: No    Sexual activity: Not Currently       Partners: Male   Lifestyle    Physical activity:       Days per week: Not on file       Minutes per session: Not on file    Stress: Not on file   Relationships    Social connections:       Talks on phone: Not on file       Gets together: Not on file       Attends [employer] service: Not on file       Active member of club or organization: Not on file       Attends meetings of clubs or organizations: Not on file       Relationship status: Not on file    Intimate partner violence:       Fear of current or ex partner: Not on file       Emotionally abused: Not on file       Physically abused: Not on file       Forced sexual activity: Not on file   Other Topics Concern    Not on file   Social History Narrative    Not on file            Allergies:  No Known Allergies     Physical Exam:  BP (!) 147/88 (Site: Left Lower Arm, Position: Sitting, Cuff Size: Small Adult)   Pulse 98   Temp 98.9 °F (37.2 °C) (Oral)   Resp 20   Ht 5' 11\" (1.803 m)   BMI 43.52 kg/m²   GENERAL: Alert, oriented x 3, not in acute distress. She is in the wheelchair. HEENT: PERRLA; EOMI. Oropharynx clear. Positive for pallor. NECK: Supple. No palpable cervical or supraclavicular lymphadenopathy. LUNGS: Good air entry bilaterally. No wheezing, crackles or rhonchi. CARDIOVASCULAR: Regular rate. No murmurs, rubs or gallops. ABDOMEN: Soft. Non-tender, non-distended. Positive bowel sounds. EXTREMITIES: Chronic lower leg edema, had improved, tenderness of the left calf, venous stasis changes. NEUROLOGIC: No focal deficits.      ECOG PS 2-3     Impression/Plan:      The patient is a pleasant 60-year-old lady with a past medical history significant for hypertension, type II diabetes mellitus, hyperlipidemia, coronary artery disease, status post stents placement, morbid obesity, who had presented to [facility] for his near syncopal episode, weakness and profound fatigue, she was found to have a white count of 31.3, hemoglobin was 5.8, platelet count 05,140, she was transferred to [facility] with concern for acute leukemia, she had a bone marrow biopsy done on 11/23/2018, 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 NGS panel- IDH1, [id number], CBL slice site. Due to the patient's D: Addition to this intake, requiring significant assistance with mobility without ECOG 2-3, the patient was treated with azacitidine 75 mg/m² ×7 day course between 11/27 and 12/3/2018, no tumor lysis was observed, she was on allopurinol for TLS prophylaxis.        She was admitted from 1/21/19-02/12/2019 for standard induction with idarubicin and cytarabine on 7+3 schedule. Her chemotherapy course overall went well,was complicated by hyperglycemia. She was admitted 02/28/2019 for influenza and pneumonia.    She had a bone marrow biopsy on 03/11/2019 confirms complete remission by histology,flow

## 2020-02-20 NOTE — PROGRESS NOTES
to [facility] with an altered mental status, she was found to be septic, was transferred to [facility], she had bacteremia and post infection, which has been removed, she completed yesterday 6/5/2019 the course of ceftriaxone. She was seen by Dr. Luis Armando Johnson, she will receive Vidaza rather than high dose ranulfo-C due to the severe myelosuppression she had with the last treatment. Labs reviewed, white count is 3.5, ANC is 1680, hemoglobin is 9.1, hematocrit 7.6, platelet count 78,385, had overall improved. No need for blood products transfusion today. Records have been requested from [facility]. Patient prefers to receive the Vidaza locally, await records from Dr. Re Toribio office. Referred patient to [address] [place] [address] for symptoms management. The patient will be referred to the wound clinic. RTC in 1 week. Past Medical History:   Diagnosis Date    CAD (coronary artery disease)     Cancer ([address]     GERD (gastroesophageal reflux disease)     Hyperlipidemia     Hypertension     Obesity     Osteoarthritis     Type 2 diabetes mellitus without complication (HCC)        Past Surgical History:   Procedure Laterality Date    CARDIAC CATHETERIZATION      CHOLECYSTECTOMY      DILATION AND CURETTAGE OF UTERUS      FOOT SURGERY Left        Current Outpatient Medications on File Prior to Visit   Medication Sig Dispense Refill    ondansetron (ZOFRAN-ODT) 8 MG TBDP disintegrating tablet Place 1 tablet under the tongue every 8 hours as needed for Nausea or Vomiting 90 tablet 2    gabapentin (NEURONTIN) 400 MG capsule Take 1 capsule by mouth 3 times daily for 360 days. 270 capsule 3    senna (SENOKOT) 8.6 MG tablet Take 1 tablet by mouth daily as needed for Constipation      insulin lispro (HUMALOG) 100 UNIT/ML injection vial Inject 0-18 Units into the skin 3 times daily (with meals) 1 vial 3    insulin lispro (HUMALOG) 100 UNIT/ML injection vial Inject 0-9 Units into the skin nightly 1 vial 3    Skin Protectants, Misc.  (ALOE VESTA PROTECTIVE) OINT ointment Apply 1 drop topically 2 times daily as needed (dry skin) 1 Tube 0    insulin glargine (LANTUS) 100 UNIT/ML injection vial Inject 45 Units into the skin every morning      loratadine (CLARITIN) 10 MG tablet Take 10 mg by mouth daily      magnesium oxide (MAG-OX) 400 MG tablet Take 400 mg by mouth 3 times daily      fluconazole (DIFLUCAN) 200 MG tablet Take 2 tablets by mouth daily 30 tablet 0    acyclovir (ZOVIRAX) 400 MG tablet Take 400 mg by mouth 2 times daily      pantoprazole (PROTONIX) 40 MG tablet Take 40 mg by mouth daily      metoprolol (LOPRESSOR) 100 MG tablet Take 100 mg by mouth 2 times daily      metFORMIN (GLUCOPHAGE) 1000 MG tablet Take 1,000 mg by mouth 2 times daily (with meals)      isosorbide mononitrate (IMDUR) 30 MG extended release tablet Take 30 mg by mouth daily      losartan (COZAAR) 100 MG tablet Take 100 mg by mouth daily       No current facility-administered medications on file prior to visit. Allergies:    Patient has no known allergies. ROS: UNLESS STATED ABOVE PATIENT DENIES:  CONSTITUTIONAL:  fever, chill, rigors, nausea, vomiting, fatigue. HEENT: blurry vision, double vision, hearing problem, tinnitus, hoarseness, dysphagia,               odynophagia  RESPIRATORY: cough, shortness of breath, sputum expectoration. CARDIOVASCULAR:  Chest pain/pressure, palpitation, syncope, irregular beats  GASTROINTESTINAL:  abdominal or rectal pain, diarrhea, constipation, . GENITOURINARY:  Burning, frequency, urgency, incontinence, discharge  INTEGUMENTARY: rash, wound, pruritis  HEMATOLOGIC/LYMPHATIC:  Swelling, sores, gum bleeding, easy bruising, pica.   MUSCULOSKELETAL:  pain, edema, joint swelling or redness  NEUROLOGICAL:  light headed, dizziness, loss of consciousness, weakness, change                                   in memory, seizures, tremors    Social history:  Social History     Socioeconomic History    Marital status:  on 7+3 schedule.   Her chemotherapy course overall went well,was complicated by hyperglycemia. She was admitted 02/28/2019 for influenza and pneumonia. She had a bone marrow biopsy on 03/11/2019 confirms complete remission by histology,flow and FISH studies.    She received consolidation cycle #1 given 04/22 -04/27 with modified high dose cytarabine. The patient had presented to [facility] with an altered mental status, she was found to be septic, was transferred to [facility], she had bacteremia and post infection, which has been removed, she completed yesterday 6/5/2019 the course of ceftriaxone. She was seen by Dr. Josh Hawley, she will receive Vidaza rather than high dose ranulfo-C due to the severe myelosuppression she had with the last treatment. Labs reviewed, white count is 3.5, ANC is 1680, hemoglobin is 9.1, hematocrit 7.6, platelet count 79,928, had overall improved. No need for blood products transfusion today. Records have been requested from [facility]. Patient prefers to receive the Vidaza locally, await records from Dr. Moe Schmitt office. Referred patient to [address] [place] [address] for symptoms management. The patient will be referred to the wound clinic. RTC in 1 week. June 13, 2019:  Medical records reviewed and patient presents in a wheelchair accompanied by her  in no acute distress without sign of sedation and/or confusion, pleasant able to voice her needs. Patient introduced to palliative care, signed an opiate agreement and we are sending a UDS. Patient adamantly denies drug use. Patient states that her #1 symptom is pain to her arms, legs and joints, hands describing neuropathy and also weakness. Patient states that she has tried Tylenol without avail, Cymbalta made her \"feel spacey and have weird dreams\". Patient from [facility] prescribed Percocet 7.5-325 mg tablets that she states works well if she takes 3 times daily. Patient denies oversedation from this.   Patient states she has not taken accuracy; however, inadvertent computerized transcription errors may be present.

## 2020-03-05 ENCOUNTER — TELEPHONE (OUTPATIENT)
Dept: PHYSICAL THERAPY | Age: 65
End: 2020-03-05

## 2020-03-23 ENCOUNTER — TELEPHONE (OUTPATIENT)
Dept: PALLATIVE CARE | Age: 65
End: 2020-03-23

## 2020-03-23 RX ORDER — OXYCODONE AND ACETAMINOPHEN 10; 325 MG/1; MG/1
1 TABLET ORAL EVERY 6 HOURS PRN
Qty: 120 TABLET | Refills: 0 | Status: SHIPPED
Start: 2020-03-23 | End: 2020-04-20 | Stop reason: SDUPTHER

## 2020-03-23 NOTE — TELEPHONE ENCOUNTER
Call from May Watson requesting a refill on her Percocet. May Griffithsanabelscottie is trying to maintain social distancing and wants her  to only have to make one trip to the pharmacy. Discussed with May Watson setting up [facility] so that a video visit/E-visit can be done in the future if needed. Information sent to May Watson via mail.

## 2020-03-26 RX ORDER — LOSARTAN POTASSIUM 100 MG/1
100 TABLET ORAL DAILY
Qty: 15 TABLET | Refills: 0 | Status: SHIPPED | OUTPATIENT
Start: 2020-03-26 | End: 2020-11-17 | Stop reason: ALTCHOICE

## 2020-04-20 RX ORDER — OXYCODONE AND ACETAMINOPHEN 10; 325 MG/1; MG/1
1 TABLET ORAL EVERY 6 HOURS PRN
Qty: 120 TABLET | Refills: 0 | Status: SHIPPED
Start: 2020-04-20 | End: 2020-05-18 | Stop reason: SDUPTHER

## 2020-05-04 ENCOUNTER — TELEPHONE (OUTPATIENT)
Dept: PALLATIVE CARE | Age: 65
End: 2020-05-04

## 2020-05-18 RX ORDER — OXYCODONE AND ACETAMINOPHEN 10; 325 MG/1; MG/1
1 TABLET ORAL EVERY 6 HOURS PRN
Qty: 120 TABLET | Refills: 0 | Status: SHIPPED
Start: 2020-05-18 | End: 2020-06-11 | Stop reason: SDUPTHER

## 2020-06-11 ENCOUNTER — VIRTUAL VISIT (OUTPATIENT)
Dept: PALLATIVE CARE | Age: 65
End: 2020-06-11
Payer: COMMERCIAL

## 2020-06-11 PROCEDURE — 99212 OFFICE O/P EST SF 10 MIN: CPT | Performed by: NURSE PRACTITIONER

## 2020-06-11 RX ORDER — OXYCODONE AND ACETAMINOPHEN 10; 325 MG/1; MG/1
1 TABLET ORAL EVERY 6 HOURS PRN
Qty: 120 TABLET | Refills: 0 | Status: SHIPPED
Start: 2020-06-11 | End: 2020-07-09 | Stop reason: SDUPTHER

## 2020-06-11 NOTE — PROGRESS NOTES
her fatigue and activity intolerance. She states she understands this, and will be trying to do so since the weather is now nicer. Otherwise will not any changes to her plan of care, and we will see her again in approximately 3 months to reassess her needs. As always she is encouraged to call she is any questions, concerns, change in her symptoms, or she requires any refills prior to her next encounter. [place] Symptom Assessment Score   [place] Score 6/11/2020 1/17/2020 11/8/2019 8/29/2019 6/13/2019   Pain Score 7 6 6 6 8   Tiredness Score 7 7 4 7 5   Nausea Score 2 3 3 1 4   Depression Score 2 2 5 Not depressed Not depressed   Anxiety Score Not anxious Not anxious 2 Not anxious 3   Drowsiness Score 2 2 2 5 3   Appetite Score Best appetite Best appetite 2 Best appetite Best appetite   Wellbeing Score 5 7 5 2 2   Dyspnea Score No shortness of breath No shortness of breath 4 No shortness of breath No shortness of breath   Other Problem Score Best possible response Best possible response Best possible response 5 Best possible response   Total Assessment Score(calculated) 25 27 33 26 25     Controlled Substance Monitoring:  [place] reviewed 6/11/20  RX Monitoring 6/11/2020   Periodic Controlled Substance Monitoring Possible medication side effects, risk of tolerance/dependence & alternative treatments discussed. ;No signs of potential drug abuse or diversion identified. ;Assessed functional status. ;Obtaining appropriate analgesic effect of treatment. Chronic Pain > 50 MEDD Re-evaluated the status of the patient's underlying condition causing pain. Chronic Pain > 80 MEDD Obtained or confirmed \"Medication Contract\" on file. I affirm this episode is with an Established Patient who has not had a related appointment within my department in the past 7 days or scheduled within the next 24 hours.     Total Time: minutes: 5-10 minutes  Time Started:  [address]  Time Ended:  [address] [facility]    Note: not billable if this call serves to triage the patient into an appointment for the relevant concern    Note: This report was completed using computerize voice recognition software. Every effort has been made to ensure accuracy; however, inadvertent computerized transcription errors may be present.

## 2020-07-09 RX ORDER — OXYCODONE AND ACETAMINOPHEN 10; 325 MG/1; MG/1
1 TABLET ORAL EVERY 6 HOURS PRN
Qty: 120 TABLET | Refills: 0 | Status: SHIPPED
Start: 2020-07-09 | End: 2020-08-07 | Stop reason: SDUPTHER

## 2020-08-07 RX ORDER — OXYCODONE AND ACETAMINOPHEN 10; 325 MG/1; MG/1
1 TABLET ORAL EVERY 6 HOURS PRN
Qty: 120 TABLET | Refills: 0 | Status: SHIPPED | OUTPATIENT
Start: 2020-08-07 | End: 2020-09-08 | Stop reason: SDUPTHER

## 2020-08-13 ENCOUNTER — OFFICE VISIT (OUTPATIENT)
Dept: ONCOLOGY | Age: 65
End: 2020-08-13
Payer: COMMERCIAL

## 2020-08-13 ENCOUNTER — HOSPITAL ENCOUNTER (OUTPATIENT)
Dept: INFUSION THERAPY | Age: 65
Discharge: HOME | End: 2020-08-13
Payer: COMMERCIAL

## 2020-08-13 VITALS — SYSTOLIC BLOOD PRESSURE: 132 MMHG | HEART RATE: 84 BPM | TEMPERATURE: 97.3 F | DIASTOLIC BLOOD PRESSURE: 99 MMHG

## 2020-08-13 DIAGNOSIS — C95.00 ACUTE LEUKEMIA NOT HAVING ACHIEVED REMISSION (HCC): ICD-10-CM

## 2020-08-13 LAB
ALBUMIN SERPL-MCNC: 3.3 G/DL (ref 3.5–5.2)
ALP BLD-CCNC: 120 U/L (ref 35–104)
ALT SERPL-CCNC: 25 U/L (ref 0–32)
ANION GAP SERPL CALCULATED.3IONS-SCNC: 13 MMOL/L (ref 7–16)
AST SERPL-CCNC: 19 U/L (ref 0–31)
BASOPHILS ABSOLUTE: 0.03 E9/L (ref 0–0.2)
BASOPHILS RELATIVE PERCENT: 0.4 % (ref 0–2)
BILIRUB SERPL-MCNC: 0.3 MG/DL (ref 0–1.2)
BUN BLDV-MCNC: 27 MG/DL (ref 8–23)
CALCIUM SERPL-MCNC: 8.9 MG/DL (ref 8.6–10.2)
CHLORIDE BLD-SCNC: 102 MMOL/L (ref 98–107)
CO2: 26 MMOL/L (ref 22–29)
CREAT SERPL-MCNC: 1.3 MG/DL (ref 0.5–1)
EOSINOPHILS ABSOLUTE: 0.59 E9/L (ref 0.05–0.5)
EOSINOPHILS RELATIVE PERCENT: 7.5 % (ref 0–6)
GFR AFRICAN AMERICAN: 50
GFR NON-AFRICAN AMERICAN: 41 ML/MIN/1.73
GLUCOSE BLD-MCNC: 266 MG/DL (ref 74–99)
HCT VFR BLD CALC: 33.6 % (ref 34–48)
HEMOGLOBIN: 10.9 G/DL (ref 11.5–15.5)
IMMATURE GRANULOCYTES #: 0.03 E9/L
IMMATURE GRANULOCYTES %: 0.4 % (ref 0–5)
LACTATE DEHYDROGENASE: 229 U/L (ref 135–214)
LYMPHOCYTES ABSOLUTE: 1.87 E9/L (ref 1.5–4)
LYMPHOCYTES RELATIVE PERCENT: 23.6 % (ref 20–42)
MCH RBC QN AUTO: 32.3 PG (ref 26–35)
MCHC RBC AUTO-ENTMCNC: 32.4 % (ref 32–34.5)
MCV RBC AUTO: 99.7 FL (ref 80–99.9)
MONOCYTES ABSOLUTE: 0.52 E9/L (ref 0.1–0.95)
MONOCYTES RELATIVE PERCENT: 6.6 % (ref 2–12)
NEUTROPHILS ABSOLUTE: 4.87 E9/L (ref 1.8–7.3)
NEUTROPHILS RELATIVE PERCENT: 61.5 % (ref 43–80)
PDW BLD-RTO: 13.2 FL (ref 11.5–15)
PLATELET # BLD: 158 E9/L (ref 130–450)
PMV BLD AUTO: 9.5 FL (ref 7–12)
POTASSIUM SERPL-SCNC: 4.2 MMOL/L (ref 3.5–5)
RBC # BLD: 3.37 E12/L (ref 3.5–5.5)
SODIUM BLD-SCNC: 141 MMOL/L (ref 132–146)
TOTAL PROTEIN: 6.5 G/DL (ref 6.4–8.3)
WBC # BLD: 7.9 E9/L (ref 4.5–11.5)

## 2020-08-13 PROCEDURE — 3017F COLORECTAL CA SCREEN DOC REV: CPT | Performed by: INTERNAL MEDICINE

## 2020-08-13 PROCEDURE — 1090F PRES/ABSN URINE INCON ASSESS: CPT | Performed by: INTERNAL MEDICINE

## 2020-08-13 PROCEDURE — 99214 OFFICE O/P EST MOD 30 MIN: CPT | Performed by: INTERNAL MEDICINE

## 2020-08-13 PROCEDURE — 83615 LACTATE (LD) (LDH) ENZYME: CPT

## 2020-08-13 PROCEDURE — 1036F TOBACCO NON-USER: CPT | Performed by: INTERNAL MEDICINE

## 2020-08-13 PROCEDURE — G8427 DOCREV CUR MEDS BY ELIG CLIN: HCPCS | Performed by: INTERNAL MEDICINE

## 2020-08-13 PROCEDURE — G8400 PT W/DXA NO RESULTS DOC: HCPCS | Performed by: INTERNAL MEDICINE

## 2020-08-13 PROCEDURE — 1123F ACP DISCUSS/DSCN MKR DOCD: CPT | Performed by: INTERNAL MEDICINE

## 2020-08-13 PROCEDURE — G8417 CALC BMI ABV UP PARAM F/U: HCPCS | Performed by: INTERNAL MEDICINE

## 2020-08-13 PROCEDURE — 4040F PNEUMOC VAC/ADMIN/RCVD: CPT | Performed by: INTERNAL MEDICINE

## 2020-08-13 PROCEDURE — 99213 OFFICE O/P EST LOW 20 MIN: CPT

## 2020-08-13 PROCEDURE — 36415 COLL VENOUS BLD VENIPUNCTURE: CPT

## 2020-08-13 PROCEDURE — 80053 COMPREHEN METABOLIC PANEL: CPT

## 2020-08-13 PROCEDURE — 85025 COMPLETE CBC W/AUTO DIFF WBC: CPT

## 2020-08-13 NOTE — PROGRESS NOTES
CARDIAC CATHETERIZATION        CHOLECYSTECTOMY        DILATION AND CURETTAGE OF UTERUS        FOOT SURGERY Left              Family History:  Family History         Family History   Problem Relation Age of Onset    Coronary Art Dis Mother      Stroke Mother      Diabetes Mother      Coronary Art Dis Father      Diabetes Father      Diabetes Sister      Cancer Paternal Aunt           Bone            Medications:  Reviewed and reconciled.     Social History:  Social History               Socioeconomic History    Marital status:        Spouse name: Not on file    Number of children: Not on file    Years of education: Not on file    Highest education level: Not on file   Occupational History    Not on file   Social Needs    Financial resource strain: Not on file    Food insecurity:       Worry: Not on file       Inability: Not on file    Transportation needs:       Medical: Not on file       Non-medical: Not on file   Tobacco Use    Smoking status: Never Smoker    Smokeless tobacco: Never Used   Substance and Sexual Activity    Alcohol use: No    Drug use: No    Sexual activity: Not Currently       Partners: Male   Lifestyle    Physical activity:       Days per week: Not on file       Minutes per session: Not on file    Stress: Not on file   Relationships    Social connections:       Talks on phone: Not on file       Gets together: Not on file       Attends Congregation service: Not on file       Active member of club or organization: Not on file       Attends meetings of clubs or organizations: Not on file       Relationship status: Not on file    Intimate partner violence:       Fear of current or ex partner: Not on file       Emotionally abused: Not on file       Physically abused: Not on file       Forced sexual activity: Not on file   Other Topics Concern    Not on file   Social History Narrative    Not on file            Allergies:  No Known Allergies     Physical Exam:  BP (!) 147/88 (Site: Left Lower Arm, Position: Sitting, Cuff Size: Small Adult)   Pulse 98   Temp 98.9 °F (37.2 °C) (Oral)   Resp 20   Ht 5' 11\" (1.803 m)   BMI 43.52 kg/m²   GENERAL: Alert, oriented x 3, not in acute distress. She is in the wheelchair. HEENT: PERRLA; EOMI. Oropharynx clear. Positive for pallor. NECK: Supple. No palpable cervical or supraclavicular lymphadenopathy. LUNGS: Good air entry bilaterally. No wheezing, crackles or rhonchi. CARDIOVASCULAR: Regular rate. No murmurs, rubs or gallops. ABDOMEN: Soft. Non-tender, non-distended. Positive bowel sounds. EXTREMITIES: Chronic lower leg edema, had improved, tenderness of the left calf, venous stasis changes. NEUROLOGIC: No focal deficits.      ECOG PS 2-3     Impression/Plan:      The patient is a pleasant 27-year-old lady with a past medical history significant for hypertension, type II diabetes mellitus, hyperlipidemia, coronary artery disease, status post stents placement, morbid obesity, who had presented to [facility] for his near syncopal episode, weakness and profound fatigue, she was found to have a white count of 31.3, hemoglobin was 5.8, platelet count 78,174, she was transferred to [facility] with concern for acute leukemia, she had a bone marrow biopsy done on 11/23/2018, 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 NGS panel- IDH1, PKIS20, CBL slice site. Due to the patient's D: Addition to this intake, requiring significant assistance with mobility without ECOG 2-3, the patient was treated with azacitidine 75 mg/m² ×7 day course between 11/27 and 12/3/2018, no tumor lysis was observed, she was on allopurinol for TLS prophylaxis.        She was admitted from 1/21/19-02/12/2019 for standard induction with idarubicin and cytarabine on 7+3 schedule. Her chemotherapy course overall went well,was complicated by hyperglycemia.  She was admitted 02/28/2019 for influenza and pneumonia. She had a bone marrow biopsy on 03/11/2019 confirms complete remission by histology,flow and FISH studies. She received consolidation cycle #1 given 04/22 -04/27 with modified high dose cytarabine.      She was seen by Dr. Author Salazar, she was recommended Vidaza, the plan was to start it in June locally, however the patient did not follow-up until February 2020, at that time surveillance was recommended. Labs reviewed, white count is 7.9, no peripheral blasts, she has mild anemia hemoglobin 10.9, MCV 99.7, platelet count is 707 8K, she is doing well clinically, there is no evidence of relapse of the AML. Epigastric pain referral was placed to GI. Continue follow-up with \[facility]\"" Med for symptoms management. RTC in 2 months.     Binta Jones MD   HEMATOLOGY/MEDICAL [facility] [address]  [address] [address]  Dept: Eileen: [phone]

## 2020-08-14 ENCOUNTER — HOSPITAL ENCOUNTER (OUTPATIENT)
Dept: INFUSION THERAPY | Age: 65
Setting detail: INFUSION SERIES
Discharge: HOME | End: 2020-08-14
Payer: COMMERCIAL

## 2020-08-14 VITALS
RESPIRATION RATE: 24 BRPM | DIASTOLIC BLOOD PRESSURE: 71 MMHG | HEART RATE: 80 BPM | SYSTOLIC BLOOD PRESSURE: 167 MMHG | OXYGEN SATURATION: 95 % | TEMPERATURE: 97.8 F

## 2020-08-14 PROCEDURE — 2580000003 HC RX 258: Performed by: NURSE PRACTITIONER

## 2020-08-14 PROCEDURE — 96360 HYDRATION IV INFUSION INIT: CPT

## 2020-08-14 RX ORDER — HEPARIN SODIUM (PORCINE) LOCK FLUSH IV SOLN 100 UNIT/ML 100 UNIT/ML
500 SOLUTION INTRAVENOUS PRN
Status: CANCELLED | OUTPATIENT
Start: 2020-08-14

## 2020-08-14 RX ORDER — 0.9 % SODIUM CHLORIDE 0.9 %
1000 INTRAVENOUS SOLUTION INTRAVENOUS ONCE
Status: CANCELLED | OUTPATIENT
Start: 2020-08-14

## 2020-08-14 RX ORDER — SODIUM CHLORIDE 9 MG/ML
INJECTION, SOLUTION INTRAVENOUS CONTINUOUS
Status: DISCONTINUED | OUTPATIENT
Start: 2020-08-14 | End: 2020-08-15 | Stop reason: HOSPADM

## 2020-08-14 RX ORDER — SODIUM CHLORIDE 0.9 % (FLUSH) 0.9 %
10 SYRINGE (ML) INJECTION PRN
Status: CANCELLED | OUTPATIENT
Start: 2020-08-14

## 2020-08-14 RX ADMIN — SODIUM CHLORIDE: 9 INJECTION, SOLUTION INTRAVENOUS at 12:59

## 2020-08-20 ENCOUNTER — TELEPHONE (OUTPATIENT)
Dept: INFUSION THERAPY | Age: 65
End: 2020-08-20

## 2020-09-08 RX ORDER — OXYCODONE AND ACETAMINOPHEN 10; 325 MG/1; MG/1
1 TABLET ORAL EVERY 6 HOURS PRN
Qty: 120 TABLET | Refills: 0 | Status: SHIPPED
Start: 2020-09-08 | End: 2020-10-02 | Stop reason: SDUPTHER

## 2020-09-10 ENCOUNTER — VIRTUAL VISIT (OUTPATIENT)
Dept: PALLATIVE CARE | Age: 65
End: 2020-09-10
Payer: COMMERCIAL

## 2020-09-10 PROCEDURE — 99422 OL DIG E/M SVC 11-20 MIN: CPT | Performed by: STUDENT IN AN ORGANIZED HEALTH CARE EDUCATION/TRAINING PROGRAM

## 2020-09-10 NOTE — PROGRESS NOTES
Department of Palliative Medicine  Telephone Encounter  Provider: Aden Aragon MD        Chief Complaint: Claude Ku is a 72 y.o. female with chief complaint of Pain    Claude Ku is a 72 y.o. female evaluated via telephone on 9/10/2020.         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 Encouraged to call with any questions, concerns, needs, or changes in symptoms. Subjective:       Claude Ku is a 72 y.o. female with significant past medical history of Leukemia. I spoke to her , due to her her becoming more nauseous while on the phone he mentioned her abdomen has become more painful. I asked if the medication are helping her, he replied in the positive. He told me she is using 3-4 medication daily for percocet. Her pain is 7/10, in her back and legs. She also uses gabapentin three times daily. She wants to continue with current regimen. No adverse effects have been noticed.            Objective:       [place] Symptom Assessment Score   [place] Score 6/11/2020 1/17/2020 11/8/2019 8/29/2019 6/13/2019   Pain Score 7 6 6 6 8   Tiredness Score 7 7 4 7 5   Nausea Score 2 3 3 1 4   Depression Score 2 2 5 Not depressed Not depressed   Anxiety Score Not anxious Not anxious 2 Not anxious 3   Drowsiness Score 2 2 2 5 3   Appetite Score Best appetite Best appetite 2 Best appetite Best appetite   Wellbeing Score 5 7 5 2 2   Dyspnea Score No shortness of breath No shortness of breath 4 No shortness of breath No shortness of breath   Other Problem Score Best possible response Best possible response Best possible response 5 Best possible response   Total Assessment Score(calculated) 25 27 33 26 25         Current Medications:  Medications reviewed: yes    Controlled Substances Monitoring: [place] reviewed 9/10/20. RX Monitoring 6/11/2020   Periodic Controlled Substance Monitoring Possible medication side effects, risk of tolerance/dependence & alternative treatments discussed. ;No signs of potential drug abuse or diversion identified. ;Assessed functional status. ;Obtaining appropriate analgesic effect of treatment. Chronic Pain > 50 MEDD Re-evaluated the status of the patient's underlying condition causing pain. Chronic Pain > 80 MEDD Obtained or confirmed \"Medication Contract\" on file. Jackie Barrios MD  Palliative Care Department     Time/Communication  Greater than 50% of time spent, total 15 minutes in face-to-face counseling and coordination of care regarding symptom management.

## 2020-09-14 ENCOUNTER — OFFICE VISIT (OUTPATIENT)
Dept: ONCOLOGY | Age: 65
End: 2020-09-14
Payer: COMMERCIAL

## 2020-09-14 ENCOUNTER — HOSPITAL ENCOUNTER (OUTPATIENT)
Dept: INFUSION THERAPY | Age: 65
Discharge: HOME | End: 2020-09-14
Payer: COMMERCIAL

## 2020-09-14 VITALS
SYSTOLIC BLOOD PRESSURE: 166 MMHG | HEART RATE: 73 BPM | TEMPERATURE: 96.8 F | HEIGHT: 71 IN | DIASTOLIC BLOOD PRESSURE: 72 MMHG | OXYGEN SATURATION: 95 % | BODY MASS INDEX: 42.68 KG/M2

## 2020-09-14 DIAGNOSIS — C95.00 ACUTE LEUKEMIA NOT HAVING ACHIEVED REMISSION (HCC): ICD-10-CM

## 2020-09-14 LAB
ALBUMIN SERPL-MCNC: 3.5 G/DL (ref 3.5–5.2)
ALP BLD-CCNC: 118 U/L (ref 35–104)
ALT SERPL-CCNC: 15 U/L (ref 0–32)
ANION GAP SERPL CALCULATED.3IONS-SCNC: 12 MMOL/L (ref 7–16)
AST SERPL-CCNC: 13 U/L (ref 0–31)
BASOPHILS ABSOLUTE: 0.03 E9/L (ref 0–0.2)
BASOPHILS RELATIVE PERCENT: 0.4 % (ref 0–2)
BILIRUB SERPL-MCNC: 0.3 MG/DL (ref 0–1.2)
BUN BLDV-MCNC: 25 MG/DL (ref 8–23)
CALCIUM SERPL-MCNC: 9.2 MG/DL (ref 8.6–10.2)
CHLORIDE BLD-SCNC: 103 MMOL/L (ref 98–107)
CO2: 27 MMOL/L (ref 22–29)
CREAT SERPL-MCNC: 1.1 MG/DL (ref 0.5–1)
EOSINOPHILS ABSOLUTE: 0.66 E9/L (ref 0.05–0.5)
EOSINOPHILS RELATIVE PERCENT: 9.6 % (ref 0–6)
GFR AFRICAN AMERICAN: >60
GFR NON-AFRICAN AMERICAN: 50 ML/MIN/1.73
GLUCOSE BLD-MCNC: 286 MG/DL (ref 74–99)
HCT VFR BLD CALC: 33.4 % (ref 34–48)
HEMOGLOBIN: 10.9 G/DL (ref 11.5–15.5)
IMMATURE GRANULOCYTES #: 0.03 E9/L
IMMATURE GRANULOCYTES %: 0.4 % (ref 0–5)
LACTATE DEHYDROGENASE: 233 U/L (ref 135–214)
LYMPHOCYTES ABSOLUTE: 1.34 E9/L (ref 1.5–4)
LYMPHOCYTES RELATIVE PERCENT: 19.6 % (ref 20–42)
MCH RBC QN AUTO: 32 PG (ref 26–35)
MCHC RBC AUTO-ENTMCNC: 32.6 % (ref 32–34.5)
MCV RBC AUTO: 97.9 FL (ref 80–99.9)
MONOCYTES ABSOLUTE: 0.57 E9/L (ref 0.1–0.95)
MONOCYTES RELATIVE PERCENT: 8.3 % (ref 2–12)
NEUTROPHILS ABSOLUTE: 4.22 E9/L (ref 1.8–7.3)
NEUTROPHILS RELATIVE PERCENT: 61.7 % (ref 43–80)
PDW BLD-RTO: 12.8 FL (ref 11.5–15)
PLATELET # BLD: 168 E9/L (ref 130–450)
PMV BLD AUTO: 9.2 FL (ref 7–12)
POTASSIUM SERPL-SCNC: 4 MMOL/L (ref 3.5–5)
RBC # BLD: 3.41 E12/L (ref 3.5–5.5)
SODIUM BLD-SCNC: 142 MMOL/L (ref 132–146)
TOTAL PROTEIN: 6.7 G/DL (ref 6.4–8.3)
WBC # BLD: 6.9 E9/L (ref 4.5–11.5)

## 2020-09-14 PROCEDURE — 4040F PNEUMOC VAC/ADMIN/RCVD: CPT | Performed by: INTERNAL MEDICINE

## 2020-09-14 PROCEDURE — 99214 OFFICE O/P EST MOD 30 MIN: CPT | Performed by: INTERNAL MEDICINE

## 2020-09-14 PROCEDURE — 83615 LACTATE (LD) (LDH) ENZYME: CPT

## 2020-09-14 PROCEDURE — 85025 COMPLETE CBC W/AUTO DIFF WBC: CPT

## 2020-09-14 PROCEDURE — G8400 PT W/DXA NO RESULTS DOC: HCPCS | Performed by: INTERNAL MEDICINE

## 2020-09-14 PROCEDURE — 3017F COLORECTAL CA SCREEN DOC REV: CPT | Performed by: INTERNAL MEDICINE

## 2020-09-14 PROCEDURE — 1036F TOBACCO NON-USER: CPT | Performed by: INTERNAL MEDICINE

## 2020-09-14 PROCEDURE — 1123F ACP DISCUSS/DSCN MKR DOCD: CPT | Performed by: INTERNAL MEDICINE

## 2020-09-14 PROCEDURE — G8427 DOCREV CUR MEDS BY ELIG CLIN: HCPCS | Performed by: INTERNAL MEDICINE

## 2020-09-14 PROCEDURE — 36415 COLL VENOUS BLD VENIPUNCTURE: CPT

## 2020-09-14 PROCEDURE — 1090F PRES/ABSN URINE INCON ASSESS: CPT | Performed by: INTERNAL MEDICINE

## 2020-09-14 PROCEDURE — 99212 OFFICE O/P EST SF 10 MIN: CPT

## 2020-09-14 PROCEDURE — G8417 CALC BMI ABV UP PARAM F/U: HCPCS | Performed by: INTERNAL MEDICINE

## 2020-09-14 PROCEDURE — 80053 COMPREHEN METABOLIC PANEL: CPT

## 2020-09-14 RX ORDER — ATORVASTATIN CALCIUM 40 MG/1
40 TABLET, FILM COATED ORAL DAILY
COMMUNITY
Start: 2020-08-07

## 2020-09-14 RX ORDER — SUCRALFATE 1 G/1
1 TABLET ORAL 3 TIMES DAILY
COMMUNITY
Start: 2020-09-01

## 2020-09-14 NOTE — PROGRESS NOTES
induction chemotherapy with 7+3.     She was admitted from 1/21/19-02/12/2019 for standard induction with idarubicin and cytarabine on 7+3 schedule.      Her chemotherapy course overall went well,was complicated by hyperglycemia. She was admitted 02/28/2019 for influenza and pneumonia.      She had a bone marrow biopsy on 03/11/2019 confirms complete remission by histology,flow and FISH studies. She received consolidation cycle #1 given 04/22 -04/27 with modified high dose cytarabine.      She was seen by Dr. Megan Flores, she was recommended Vidaza, the plan was to start it in June locally, however the patient did not follow-up until February 2020, at that time surveillance was recommended. The patient comes for a follow-up visit, she is feeling better overall, she was having epigastric pain after she eats and after she takes her medications. Referral was placed to GI, she had an EGD done by Dr. Julian Madrid, the records are not available yet. About 4 days ago the patient had pain in the mid abdomen in the periumbilical area, it was cramping, she had nausea, but no vomiting, no change in her bowel habits. The pain has resolved. Review of Systems;  CONSTITUTIONAL: No fever, chills. Good appetite, improved energy level. ENMT: Eyes: No diplopia; Nose: No epistaxis. Mouth: No sore throat. RESPIRATORY: No hemoptysis, shortness of breath, cough. CARDIOVASCULAR: No chest pain, palpitations. GASTROINTESTINAL: No vomiting, abdominal pain as per HPI, no diarrhea, no constipation. GENITOURINARY: No dysuria, urinary frequency, hematuria. NEURO: No syncope, presyncope, headache.   Remainder:  ROS NEGATIVE     Past Medical History:  Past Medical History             Diagnosis Date    CAD (coronary artery disease)      [facility] ([facility]      GERD (gastroesophageal reflux disease)      Hyperlipidemia      Hypertension      Obesity      Osteoarthritis      Type 2 diabetes mellitus without complication (HCC)          Patient Active Problem List   Diagnosis    Acute leukemia not having achieved remission ([facility]    Generalized weakness    Sepsis ([facility]         Past Surgical History:  Past Surgical History             Procedure Laterality Date    CARDIAC CATHETERIZATION        CHOLECYSTECTOMY        DILATION AND CURETTAGE OF UTERUS        FOOT SURGERY Left              Family History:  Family History         Family History   Problem Relation Age of Onset    Coronary Art Dis Mother      Stroke Mother      Diabetes Mother      Coronary Art Dis Father      Diabetes Father      Diabetes Sister      Cancer Paternal Aunt           Bone            Medications:  Reviewed and reconciled.     Social History:  Social History               Socioeconomic History    Marital status:        Spouse name: Not on file    Number of children: Not on file    Years of education: Not on file    Highest education level: Not on file   Occupational History    Not on file   Social Needs    Financial resource strain: Not on file    Food insecurity:       Worry: Not on file       Inability: Not on file    Transportation needs:       Medical: Not on file       Non-medical: Not on file   Tobacco Use    Smoking status: Never Smoker    Smokeless tobacco: Never Used   Substance and Sexual Activity    Alcohol use: No    Drug use: No    Sexual activity: Not Currently       Partners: Male   Lifestyle    Physical activity:       Days per week: Not on file       Minutes per session: Not on file    Stress: Not on file   Relationships    Social connections:       Talks on phone: Not on file       Gets together: Not on file       Attends Rastafarian service: Not on file       Active member of club or organization: Not on file       Attends meetings of clubs or organizations: Not on file       Relationship status: Not on file    Intimate partner violence:       Fear of current or ex partner: Not on file       Emotionally abused: Not on file       Physically abused: Not on file       Forced sexual activity: Not on file   Other Topics Concern    Not on file   Social History Narrative    Not on file            Allergies:  No Known Allergies     Physical Exam:  BP (!) 166/72 (Site: Right Upper Arm, Position: Sitting, Cuff Size: Large Adult)   Pulse 73   Temp 96.8 °F (36 °C) (Temporal)   Ht 5' 11\" (1.803 m)   SpO2 95%   BMI 42.68 kg/m²     GENERAL: Alert, oriented x 3, not in acute distress. She is in the wheelchair. HEENT: PERRLA; EOMI. Oropharynx clear. Positive for pallor. NECK: Supple. No palpable cervical or supraclavicular lymphadenopathy. LUNGS: Good air entry bilaterally. No wheezing, crackles or rhonchi. CARDIOVASCULAR: Regular rate. No murmurs, rubs or gallops. ABDOMEN: Soft. Non-tender, non-distended. Positive bowel sounds. EXTREMITIES: Chronic lower leg edema, had improved, tenderness of the left calf, venous stasis changes. NEUROLOGIC: No focal deficits.      ECOG PS 2-3     Impression/Plan:      The patient is a pleasant 80-year-old lady with a past medical history significant for hypertension, type II diabetes mellitus, hyperlipidemia, coronary artery disease, status post stents placement, morbid obesity, who had presented to 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 NGS panel- IDH1, [id number], CBL slice site.  Due to the patient's D: Addition to this intake, requiring significant assistance with mobility without ECOG 2-3, the patient was treated with azacitidine 75 mg/m² ×7 day course between 11/27 and 12/3/2018, no tumor lysis was observed, she was on allopurinol for TLS

## 2020-10-02 RX ORDER — OXYCODONE AND ACETAMINOPHEN 10; 325 MG/1; MG/1
1 TABLET ORAL EVERY 6 HOURS PRN
Qty: 120 TABLET | Refills: 0 | Status: SHIPPED
Start: 2020-10-02 | End: 2020-11-02 | Stop reason: SDUPTHER

## 2020-10-02 NOTE — TELEPHONE ENCOUNTER
Spoke with patient to let them know that their medication refills were called into their pharmacy.     Visit date 12/3/20

## 2020-10-09 ENCOUNTER — HOSPITAL ENCOUNTER (OUTPATIENT)
Dept: INFUSION THERAPY | Age: 65
End: 2020-10-09
Payer: COMMERCIAL

## 2020-10-12 ENCOUNTER — HOSPITAL ENCOUNTER (OUTPATIENT)
Dept: INFUSION THERAPY | Age: 65
Discharge: HOME | End: 2020-10-12
Payer: COMMERCIAL

## 2020-10-12 ENCOUNTER — OFFICE VISIT (OUTPATIENT)
Dept: ONCOLOGY | Age: 65
End: 2020-10-12
Payer: COMMERCIAL

## 2020-10-12 VITALS
HEIGHT: 72 IN | TEMPERATURE: 97.3 F | BODY MASS INDEX: 41.5 KG/M2 | SYSTOLIC BLOOD PRESSURE: 179 MMHG | HEART RATE: 77 BPM | OXYGEN SATURATION: 94 % | DIASTOLIC BLOOD PRESSURE: 74 MMHG

## 2020-10-12 DIAGNOSIS — C95.00 ACUTE LEUKEMIA NOT HAVING ACHIEVED REMISSION (HCC): ICD-10-CM

## 2020-10-12 LAB
BASOPHILS ABSOLUTE: 0.03 E9/L (ref 0–0.2)
BASOPHILS RELATIVE PERCENT: 0.5 % (ref 0–2)
EOSINOPHILS ABSOLUTE: 0.36 E9/L (ref 0.05–0.5)
EOSINOPHILS RELATIVE PERCENT: 5.6 % (ref 0–6)
HCT VFR BLD CALC: 32 % (ref 34–48)
HEMOGLOBIN: 10.6 G/DL (ref 11.5–15.5)
IMMATURE GRANULOCYTES #: 0.02 E9/L
IMMATURE GRANULOCYTES %: 0.3 % (ref 0–5)
LYMPHOCYTES ABSOLUTE: 1.33 E9/L (ref 1.5–4)
LYMPHOCYTES RELATIVE PERCENT: 20.7 % (ref 20–42)
MCH RBC QN AUTO: 32.6 PG (ref 26–35)
MCHC RBC AUTO-ENTMCNC: 33.1 % (ref 32–34.5)
MCV RBC AUTO: 98.5 FL (ref 80–99.9)
MONOCYTES ABSOLUTE: 0.53 E9/L (ref 0.1–0.95)
MONOCYTES RELATIVE PERCENT: 8.2 % (ref 2–12)
NEUTROPHILS ABSOLUTE: 4.16 E9/L (ref 1.8–7.3)
NEUTROPHILS RELATIVE PERCENT: 64.7 % (ref 43–80)
PDW BLD-RTO: 12.6 FL (ref 11.5–15)
PLATELET # BLD: 171 E9/L (ref 130–450)
PMV BLD AUTO: 9.4 FL (ref 7–12)
RBC # BLD: 3.25 E12/L (ref 3.5–5.5)
WBC # BLD: 6.4 E9/L (ref 4.5–11.5)

## 2020-10-12 PROCEDURE — 1036F TOBACCO NON-USER: CPT | Performed by: INTERNAL MEDICINE

## 2020-10-12 PROCEDURE — 85025 COMPLETE CBC W/AUTO DIFF WBC: CPT

## 2020-10-12 PROCEDURE — G8484 FLU IMMUNIZE NO ADMIN: HCPCS | Performed by: INTERNAL MEDICINE

## 2020-10-12 PROCEDURE — G8400 PT W/DXA NO RESULTS DOC: HCPCS | Performed by: INTERNAL MEDICINE

## 2020-10-12 PROCEDURE — 99212 OFFICE O/P EST SF 10 MIN: CPT

## 2020-10-12 PROCEDURE — G8427 DOCREV CUR MEDS BY ELIG CLIN: HCPCS | Performed by: INTERNAL MEDICINE

## 2020-10-12 PROCEDURE — 36415 COLL VENOUS BLD VENIPUNCTURE: CPT

## 2020-10-12 PROCEDURE — 99214 OFFICE O/P EST MOD 30 MIN: CPT | Performed by: INTERNAL MEDICINE

## 2020-10-12 PROCEDURE — 3017F COLORECTAL CA SCREEN DOC REV: CPT | Performed by: INTERNAL MEDICINE

## 2020-10-12 PROCEDURE — 4040F PNEUMOC VAC/ADMIN/RCVD: CPT | Performed by: INTERNAL MEDICINE

## 2020-10-12 PROCEDURE — 1123F ACP DISCUSS/DSCN MKR DOCD: CPT | Performed by: INTERNAL MEDICINE

## 2020-10-12 PROCEDURE — 1090F PRES/ABSN URINE INCON ASSESS: CPT | Performed by: INTERNAL MEDICINE

## 2020-10-12 PROCEDURE — G8417 CALC BMI ABV UP PARAM F/U: HCPCS | Performed by: INTERNAL MEDICINE
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
## 2020-11-02 RX ORDER — OXYCODONE AND ACETAMINOPHEN 10; 325 MG/1; MG/1
1 TABLET ORAL EVERY 6 HOURS PRN
Qty: 120 TABLET | Refills: 0 | Status: ON HOLD
Start: 2020-11-02 | End: 2020-11-20 | Stop reason: HOSPADM

## 2020-11-02 NOTE — TELEPHONE ENCOUNTER
Call from [facility], requesting a refill on Percocet 10/325 mg. Send to [facility] in [place]. Next radha 12/3/20.

## 2020-11-11 ENCOUNTER — TELEPHONE (OUTPATIENT)
Dept: SLEEP CENTER | Age: 65
End: 2020-11-11

## 2020-11-13 ENCOUNTER — TELEPHONE (OUTPATIENT)
Dept: SLEEP CENTER | Age: 65
End: 2020-11-13

## 2020-11-17 ENCOUNTER — HOSPITAL ENCOUNTER (INPATIENT)
Age: 65
LOS: 3 days | Discharge: HOME HEALTH | DRG: 558 | End: 2020-11-20
Attending: EMERGENCY MEDICINE | Admitting: INTERNAL MEDICINE
Payer: COMMERCIAL

## 2020-11-17 ENCOUNTER — APPOINTMENT (OUTPATIENT)
Dept: CT IMAGING | Age: 65
DRG: 558 | End: 2020-11-17
Payer: COMMERCIAL

## 2020-11-17 ENCOUNTER — APPOINTMENT (OUTPATIENT)
Dept: GENERAL RADIOLOGY | Age: 65
DRG: 558 | End: 2020-11-17
Payer: COMMERCIAL

## 2020-11-17 PROBLEM — M62.82 RHABDOMYOLYSIS: Status: ACTIVE | Noted: 2020-11-17

## 2020-11-17 LAB
ALBUMIN SERPL-MCNC: 3.3 G/DL (ref 3.5–5.2)
ALP BLD-CCNC: 77 U/L (ref 35–104)
ALT SERPL-CCNC: 21 U/L (ref 0–32)
ANION GAP SERPL CALCULATED.3IONS-SCNC: 13 MMOL/L (ref 7–16)
AST SERPL-CCNC: 49 U/L (ref 0–31)
BACTERIA: ABNORMAL /HPF
BASOPHILS ABSOLUTE: 0.02 E9/L (ref 0–0.2)
BASOPHILS RELATIVE PERCENT: 0.3 % (ref 0–2)
BILIRUB SERPL-MCNC: 0.5 MG/DL (ref 0–1.2)
BILIRUBIN URINE: NEGATIVE
BLOOD, URINE: ABNORMAL
BUN BLDV-MCNC: 34 MG/DL (ref 8–23)
CALCIUM SERPL-MCNC: 9 MG/DL (ref 8.6–10.2)
CHLORIDE BLD-SCNC: 100 MMOL/L (ref 98–107)
CLARITY: ABNORMAL
CO2: 28 MMOL/L (ref 22–29)
COLOR: YELLOW
CREAT SERPL-MCNC: 1.3 MG/DL (ref 0.5–1)
EKG ATRIAL RATE: 300 BPM
EKG Q-T INTERVAL: 322 MS
EKG QRS DURATION: 56 MS
EKG QTC CALCULATION (BAZETT): 411 MS
EKG R AXIS: 0 DEGREES
EKG T AXIS: 90 DEGREES
EKG VENTRICULAR RATE: 98 BPM
EOSINOPHILS ABSOLUTE: 0.08 E9/L (ref 0.05–0.5)
EOSINOPHILS RELATIVE PERCENT: 1.1 % (ref 0–6)
EPITHELIAL CELLS, UA: ABNORMAL /HPF
GFR AFRICAN AMERICAN: 50
GFR NON-AFRICAN AMERICAN: 41 ML/MIN/1.73
GLUCOSE BLD-MCNC: 100 MG/DL
GLUCOSE BLD-MCNC: 101 MG/DL (ref 74–99)
GLUCOSE URINE: NEGATIVE MG/DL
HCT VFR BLD CALC: 34.8 % (ref 34–48)
HEMOGLOBIN: 11.9 G/DL (ref 11.5–15.5)
IMMATURE GRANULOCYTES #: 0.03 E9/L
IMMATURE GRANULOCYTES %: 0.4 % (ref 0–5)
KETONES, URINE: NEGATIVE MG/DL
LACTIC ACID, SEPSIS: 1.8 MMOL/L (ref 0.5–1.9)
LEUKOCYTE ESTERASE, URINE: ABNORMAL
LYMPHOCYTES ABSOLUTE: 1.57 E9/L (ref 1.5–4)
LYMPHOCYTES RELATIVE PERCENT: 20.7 % (ref 20–42)
MCH RBC QN AUTO: 32.1 PG (ref 26–35)
MCHC RBC AUTO-ENTMCNC: 34.2 % (ref 32–34.5)
MCV RBC AUTO: 93.8 FL (ref 80–99.9)
METER GLUCOSE: 100 MG/DL (ref 74–99)
METER GLUCOSE: 107 MG/DL (ref 74–99)
METER GLUCOSE: 122 MG/DL (ref 74–99)
MONOCYTES ABSOLUTE: 0.6 E9/L (ref 0.1–0.95)
MONOCYTES RELATIVE PERCENT: 7.9 % (ref 2–12)
NEUTROPHILS ABSOLUTE: 5.3 E9/L (ref 1.8–7.3)
NEUTROPHILS RELATIVE PERCENT: 69.6 % (ref 43–80)
NITRITE, URINE: POSITIVE
PDW BLD-RTO: 13.2 FL (ref 11.5–15)
PH UA: 5.5 (ref 5–9)
PLATELET # BLD: 183 E9/L (ref 130–450)
PMV BLD AUTO: 10 FL (ref 7–12)
POTASSIUM REFLEX MAGNESIUM: 3.8 MMOL/L (ref 3.5–5)
PRO-BNP: 289 PG/ML (ref 0–125)
PROTEIN UA: >=300 MG/DL
RBC # BLD: 3.71 E12/L (ref 3.5–5.5)
RBC UA: ABNORMAL /HPF (ref 0–2)
SODIUM BLD-SCNC: 141 MMOL/L (ref 132–146)
SPECIFIC GRAVITY UA: >=1.03 (ref 1–1.03)
TOTAL CK: 1741 U/L (ref 20–180)
TOTAL PROTEIN: 7.1 G/DL (ref 6.4–8.3)
TROPONIN: 0.03 NG/ML (ref 0–0.03)
UROBILINOGEN, URINE: 0.2 E.U./DL
WBC # BLD: 7.6 E9/L (ref 4.5–11.5)
WBC UA: ABNORMAL /HPF (ref 0–5)

## 2020-11-17 PROCEDURE — 51702 INSERT TEMP BLADDER CATH: CPT

## 2020-11-17 PROCEDURE — 99285 EMERGENCY DEPT VISIT HI MDM: CPT

## 2020-11-17 PROCEDURE — 6360000002 HC RX W HCPCS: Performed by: STUDENT IN AN ORGANIZED HEALTH CARE EDUCATION/TRAINING PROGRAM

## 2020-11-17 PROCEDURE — 93005 ELECTROCARDIOGRAM TRACING: CPT | Performed by: STUDENT IN AN ORGANIZED HEALTH CARE EDUCATION/TRAINING PROGRAM

## 2020-11-17 PROCEDURE — 84484 ASSAY OF TROPONIN QUANT: CPT

## 2020-11-17 PROCEDURE — 82962 GLUCOSE BLOOD TEST: CPT

## 2020-11-17 PROCEDURE — 82550 ASSAY OF CK (CPK): CPT

## 2020-11-17 PROCEDURE — 80053 COMPREHEN METABOLIC PANEL: CPT

## 2020-11-17 PROCEDURE — 1200000000 HC SEMI PRIVATE

## 2020-11-17 PROCEDURE — 93010 ELECTROCARDIOGRAM REPORT: CPT | Performed by: INTERNAL MEDICINE

## 2020-11-17 PROCEDURE — 83605 ASSAY OF LACTIC ACID: CPT

## 2020-11-17 PROCEDURE — 96361 HYDRATE IV INFUSION ADD-ON: CPT

## 2020-11-17 PROCEDURE — 81001 URINALYSIS AUTO W/SCOPE: CPT

## 2020-11-17 PROCEDURE — 96365 THER/PROPH/DIAG IV INF INIT: CPT

## 2020-11-17 PROCEDURE — 2580000003 HC RX 258: Performed by: STUDENT IN AN ORGANIZED HEALTH CARE EDUCATION/TRAINING PROGRAM

## 2020-11-17 PROCEDURE — 85025 COMPLETE CBC W/AUTO DIFF WBC: CPT

## 2020-11-17 PROCEDURE — 71045 X-RAY EXAM CHEST 1 VIEW: CPT

## 2020-11-17 PROCEDURE — 87040 BLOOD CULTURE FOR BACTERIA: CPT

## 2020-11-17 PROCEDURE — 87077 CULTURE AEROBIC IDENTIFY: CPT

## 2020-11-17 PROCEDURE — 87186 SC STD MICRODIL/AGAR DIL: CPT

## 2020-11-17 PROCEDURE — 87150 DNA/RNA AMPLIFIED PROBE: CPT

## 2020-11-17 PROCEDURE — 70450 CT HEAD/BRAIN W/O DYE: CPT

## 2020-11-17 PROCEDURE — 83880 ASSAY OF NATRIURETIC PEPTIDE: CPT

## 2020-11-17 PROCEDURE — 87088 URINE BACTERIA CULTURE: CPT

## 2020-11-17 RX ORDER — 0.9 % SODIUM CHLORIDE 0.9 %
1000 INTRAVENOUS SOLUTION INTRAVENOUS ONCE
Status: DISCONTINUED | OUTPATIENT
Start: 2020-11-17 | End: 2020-11-17

## 2020-11-17 RX ORDER — AMLODIPINE BESYLATE 2.5 MG/1
2.5 TABLET ORAL DAILY
Status: ON HOLD | COMMUNITY
End: 2020-11-20 | Stop reason: HOSPADM

## 2020-11-17 RX ORDER — INSULIN DEGLUDEC INJECTION 100 U/ML
66 INJECTION, SOLUTION SUBCUTANEOUS DAILY
COMMUNITY

## 2020-11-17 RX ORDER — ONDANSETRON HYDROCHLORIDE 8 MG/1
8 TABLET, FILM COATED ORAL EVERY 8 HOURS PRN
COMMUNITY

## 2020-11-17 RX ORDER — 0.9 % SODIUM CHLORIDE 0.9 %
1000 INTRAVENOUS SOLUTION INTRAVENOUS ONCE
Status: COMPLETED | OUTPATIENT
Start: 2020-11-17 | End: 2020-11-17

## 2020-11-17 RX ADMIN — SODIUM CHLORIDE 1000 ML: 9 INJECTION, SOLUTION INTRAVENOUS at 12:51

## 2020-11-17 RX ADMIN — MEROPENEM 1 G: 1 INJECTION, POWDER, FOR SOLUTION INTRAVENOUS at 13:06

## 2020-11-17 ASSESSMENT — PAIN DESCRIPTION - PROGRESSION: CLINICAL_PROGRESSION: GRADUALLY WORSENING

## 2020-11-17 ASSESSMENT — PAIN - FUNCTIONAL ASSESSMENT: PAIN_FUNCTIONAL_ASSESSMENT: PREVENTS OR INTERFERES WITH ALL ACTIVE AND SOME PASSIVE ACTIVITIES

## 2020-11-17 ASSESSMENT — PAIN DESCRIPTION - DESCRIPTORS: DESCRIPTORS: CONSTANT;ACHING

## 2020-11-17 ASSESSMENT — PAIN SCALES - WONG BAKER: WONGBAKER_NUMERICALRESPONSE: 8

## 2020-11-17 ASSESSMENT — PAIN DESCRIPTION - PAIN TYPE: TYPE: ACUTE PAIN

## 2020-11-17 ASSESSMENT — PAIN DESCRIPTION - ORIENTATION: ORIENTATION: MID;UPPER

## 2020-11-17 ASSESSMENT — PAIN DESCRIPTION - ONSET: ONSET: ON-GOING

## 2020-11-17 ASSESSMENT — PAIN DESCRIPTION - FREQUENCY: FREQUENCY: CONTINUOUS

## 2020-11-17 ASSESSMENT — PAIN DESCRIPTION - LOCATION: LOCATION: ABDOMEN

## 2020-11-17 NOTE — ED PROVIDER NOTES
Movements: Extraocular movements intact. Conjunctiva/sclera: Conjunctivae normal.      Pupils: Pupils are equal, round, and reactive to light. Neck:      Musculoskeletal: Normal range of motion and neck supple. No muscular tenderness. Cardiovascular:      Rate and Rhythm: Regular rhythm. Tachycardia present. Pulses: Normal pulses. Heart sounds: Normal heart sounds. Pulmonary:      Effort: Pulmonary effort is normal. No respiratory distress. Breath sounds: Normal breath sounds. No wheezing or rales. Abdominal:      General: Bowel sounds are normal.      Palpations: Abdomen is soft. Tenderness: There is no abdominal tenderness. There is no guarding. Comments: Ventral abdominal wall hernia   Genitourinary:     Comments: Patient rolled and shows large amount of brown non-melena non-bloody stool. Musculoskeletal: Normal range of motion. General: No signs of injury. Right lower leg: Edema present. Left lower leg: Edema present. Skin:     General: Skin is warm and dry. Coloration: Skin is not jaundiced or pale. Findings: Erythema (venous stasis changes b/l lower extremities) and rash (venous stasis changes b/l lower extremities) present. Neurological:      Mental Status: She is alert. She is disoriented. Motor: Weakness (generalized) present. Procedures       MDM     59-year-old female presents to the ED via EMS due to generalized weakness which led her to being on the ground at home for the past 2 days without being able to get up even to go to the bathroom; she was found in her urine and feces after her  finally called EMS. On arrival patient is mildly tachycardic in the low 100s but vitals otherwise stable; she is very disheveled, smells of urine and feces, there are notable bilateral lower extremity chronic venous stasis changes but no obvious open wounds.  Unable to obtain very much history, the patient is oriented to self, [address] [place] [address] 1625 ATTESTATION:     I have personally performed and/or participated in the history, exam, medical decision making, and procedures and agree with all pertinent clinical information unless otherwise noted. I have also reviewed and agree with the past medical, family and social history unless otherwise noted. I have discussed this patient in detail with the resident, and provided the instruction and education regarding patient here from home for fatigue. Apparently has not been out of bed or moving around last couple of days. No reported fevers at home. No reported cough or URI symptoms however the patient is a very poor historian offers no formation herself. Currently is denying pain anywhere. .  My findings/plan: Patient oriented to person and place, not to time. Is a poor historian otherwise. She is obese, resting comfortably in the bed no distress. Fine resting tremor noted to the bilateral hands left greater than right. Lungs are clear anteriorly. Heart rate mildly tachycardic. Abdomen soft and nontender. No facial droop or slurred speech. No focal neurologic deficit. Does appear somewhat fatigued but is in no distress. No sign of acute head or face injury. Arms and legs show no signs of acute bony or joint injuries. [NC]   2041 Patient reassessed, she is still showing severe generalized weakness and further history proves still difficult to obtain. She states that she does remember going to her Wound Care appointment 2 days ago, and that her  at some point told her \"you have to get up off the floor or you are going to the hospital.\"    []   [phone] Call placed to Dr. Vicente Phillips for admission.    []   [id number] with Dr. Vicente Phillips, discussed case, they agree to admit this patient.      []      ED Course User Index  [NC] Jocelyne Ji DO  [] Pravin Bower,        --------------------------------------------- PAST HISTORY ---------------------------------------------  Past Medical History:  has a past medical history of CAD (coronary artery disease), Cancer ([facility] GERD (gastroesophageal reflux disease), Hyperlipidemia, Hypertension, Obesity, Osteoarthritis, and Type 2 diabetes mellitus without complication ([facility] Past Surgical History:  has a past surgical history that includes Cholecystectomy; Dilation and curettage of uterus; Foot surgery (Left); and Cardiac catheterization. Social History:  reports that she has never smoked. She has never used smokeless tobacco. She reports that she does not drink alcohol or use drugs. Family History: family history includes Cancer in her paternal aunt; Coronary Art Dis in her father and mother; Diabetes in her father, mother, and sister; Stroke in her mother. The patients home medications have been reviewed.     Allergies: Patient has no allergy information on record.    -------------------------------------------------- RESULTS -------------------------------------------------    LABS:  Results for orders placed or performed during the hospital encounter of 11/17/20   CBC Auto Differential   Result Value Ref Range    WBC 7.6 4.5 - 11.5 E9/L    RBC 3.71 3.50 - 5.50 E12/L    Hemoglobin 11.9 11.5 - 15.5 g/dL    Hematocrit 34.8 34.0 - 48.0 %    MCV 93.8 80.0 - 99.9 fL    MCH 32.1 26.0 - 35.0 pg    MCHC 34.2 32.0 - 34.5 %    RDW 13.2 11.5 - 15.0 fL    Platelets 404 388 - 552 E9/L    MPV 10.0 7.0 - 12.0 fL    Neutrophils % 69.6 43.0 - 80.0 %    Immature Granulocytes % 0.4 0.0 - 5.0 %    Lymphocytes % 20.7 20.0 - 42.0 %    Monocytes % 7.9 2.0 - 12.0 %    Eosinophils % 1.1 0.0 - 6.0 %    Basophils % 0.3 0.0 - 2.0 %    Neutrophils Absolute 5.30 1.80 - 7.30 E9/L    Immature Granulocytes # 0.03 E9/L    Lymphocytes Absolute 1.57 1.50 - 4.00 E9/L    Monocytes Absolute 0.60 0.10 - 0.95 E9/L    Eosinophils Absolute 0.08 0.05 - 0.50 E9/L    Basophils Absolute 0.02 0.00 - 0.20 E9/L   Comprehensive Metabolic Panel w/ Reflex to MG   Result Value Ref Range    Sodium 141 132 - 146 mmol/L    Potassium reflex Magnesium 3.8 3.5 - 5.0 mmol/L    Chloride 100 98 - 107 mmol/L    CO2 28 22 - 29 mmol/L    Anion Gap 13 7 - 16 mmol/L    Glucose 101 (H) 74 - 99 mg/dL    BUN 34 (H) 8 - 23 mg/dL    CREATININE 1.3 (H) 0.5 - 1.0 mg/dL    GFR Non-African American 41 >=60 mL/min/1.73    GFR African American 50     Calcium 9.0 8.6 - 10.2 mg/dL    Total Protein 7.1 6.4 - 8.3 g/dL    Alb 3.3 (L) 3.5 - 5.2 g/dL    Total Bilirubin 0.5 0.0 - 1.2 mg/dL    Alkaline Phosphatase 77 35 - 104 U/L    ALT 21 0 - 32 U/L    AST 49 (H) 0 - 31 U/L   Troponin   Result Value Ref Range    Troponin 0.03 0.00 - 0.03 ng/mL   Brain Natriuretic Peptide   Result Value Ref Range    Pro- (H) 0 - 125 pg/mL   Urinalysis, reflex to microscopic   Result Value Ref Range    Color, UA Yellow Straw/Yellow    Clarity, UA SLCLOUDY Clear    Glucose, Ur Negative Negative mg/dL    Bilirubin Urine Negative Negative    Ketones, Urine Negative Negative mg/dL    Specific Gravity, UA >=1.030 1.005 - 1.030    Blood, Urine LARGE (A) Negative    pH, UA 5.5 5.0 - 9.0    Protein, UA >=300 (A) Negative mg/dL    Urobilinogen, Urine 0.2 <2.0 E.U./dL    Nitrite, Urine POSITIVE (A) Negative    Leukocyte Esterase, Urine SMALL (A) Negative   Lactate, Sepsis   Result Value Ref Range    Lactic Acid, Sepsis 1.8 0.5 - 1.9 mmol/L   CK   Result Value Ref Range    Total CK 1,741 (H) 20 - 180 U/L   Microscopic Urinalysis   Result Value Ref Range    WBC, UA 10-20 (A) 0 - 5 /HPF    RBC, UA 5-10 (A) 0 - 2 /HPF    Epithelial Cells, UA FEW /HPF    Bacteria, UA MANY (A) None Seen /HPF   POCT glucose   Result Value Ref Range    Glucose 100 mg/dL   POCT Glucose   Result Value Ref Range    Meter Glucose 100 (H) 74 - 99 mg/dL   EKG 12 Lead   Result Value Ref Range    Ventricular Rate 98 BPM    Atrial Rate 300 BPM    QRS Duration 56 ms    Q-T Interval 322 ms    QTc Calculation (Bazett) 411 ms    R Axis 0 degrees    T Axis 90 degrees       RADIOLOGY:  CT HEAD WO CONTRAST   Final Result   No acute CVA, intracranial bleed, or brain mass. XR CHEST 1 VIEW   Final Result   No acute process. EKG: This EKG is signed and interpreted by the emergency department physician. Rate: 98  Rhythm: Junctional  Interpretation: Accelerated junctional rhythm with occasional PVCs; normal axis, low voltage QRS, normal CA interval, normal QRS duration, no QT prolongation, no ST elevations  Comparison: changes compared to previous EKG      ------------------------- NURSING NOTES AND VITALS REVIEWED ---------------------------  Date / Time Roomed:  11/17/2020 10:05 AM  ED Bed Assignment:  [id number]    The nursing notes within the ED encounter and vital signs as below have been reviewed. Patient Vitals for the past 24 hrs:   BP Temp Temp src Pulse Resp SpO2 Height Weight   11/17/20 1558 (!) 149/69 98.4 °F (36.9 °C) Oral 104 16 95 % -- --   11/17/20 1227 (!) 147/78 -- -- 97 22 95 % -- --   11/17/20 1126 (!) 148/87 -- -- 98 19 95 % -- --   11/17/20 1004 (!) 167/93 97.3 °F (36.3 °C) Oral 103 22 96 % 5' 6\" (1.676 m) 300 lb (136.1 kg)       Oxygen Saturation Interpretation: Normal    ------------------------------------------ PROGRESS NOTES ------------------------------------------  Re-evaluation(s):  Please see ED course    Counseling:  I have spoken with the patient and discussed todays results, in addition to providing specific details for the plan of care and counseling regarding the diagnosis and prognosis.   Their questions are answered at this time and they are agreeable with the plan of admission.    --------------------------------- ADDITIONAL PROVIDER NOTES ---------------------------------  Consultations:  Please see ED course    This patient's ED course included: a personal history and physicial examination, re-evaluation prior to disposition, multiple bedside re-evaluations, IV medications, cardiac monitoring, continuous pulse oximetry and complex medical decision making and emergency management    This patient has remained hemodynamically stable during their ED course. Diagnosis:  1. Non-traumatic rhabdomyolysis    2. General weakness    3. Dehydration    4. Disorientation    5. Urinary tract infection with hematuria, site unspecified        Disposition:  Patient's disposition: Admit to med/surg floor  Patient's condition is stable.            Jennifer Krause DO  Resident  11/17/20 [id number]
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
## 2020-11-18 LAB
ACINETOBACTER BAUMANNII BY PCR: NOT DETECTED
ALBUMIN SERPL-MCNC: 2.9 G/DL (ref 3.5–5.2)
ALP BLD-CCNC: 60 U/L (ref 35–104)
ALT SERPL-CCNC: 20 U/L (ref 0–32)
ANION GAP SERPL CALCULATED.3IONS-SCNC: 7 MMOL/L (ref 7–16)
ANION GAP SERPL CALCULATED.3IONS-SCNC: 7 MMOL/L (ref 7–16)
AST SERPL-CCNC: 45 U/L (ref 0–31)
BILIRUB SERPL-MCNC: 0.5 MG/DL (ref 0–1.2)
BOTTLE TYPE: ABNORMAL
BUN BLDV-MCNC: 18 MG/DL (ref 8–23)
BUN BLDV-MCNC: 19 MG/DL (ref 8–23)
CALCIUM SERPL-MCNC: 7.9 MG/DL (ref 8.6–10.2)
CALCIUM SERPL-MCNC: 8.2 MG/DL (ref 8.6–10.2)
CANDIDA ALBICANS BY PCR: NOT DETECTED
CANDIDA GLABRATA BY PCR: NOT DETECTED
CANDIDA KRUSEI BY PCR: NOT DETECTED
CANDIDA PARAPSILOSIS BY PCR: NOT DETECTED
CANDIDA TROPICALIS BY PCR: NOT DETECTED
CHLORIDE BLD-SCNC: 103 MMOL/L (ref 98–107)
CHLORIDE BLD-SCNC: 105 MMOL/L (ref 98–107)
CO2: 27 MMOL/L (ref 22–29)
CO2: 29 MMOL/L (ref 22–29)
CREAT SERPL-MCNC: 0.9 MG/DL (ref 0.5–1)
CREAT SERPL-MCNC: 1 MG/DL (ref 0.5–1)
ENTEROBACTER CLOACAE COMPLEX BY PCR: NOT DETECTED
ENTEROBACTERALES BY PCR: NOT DETECTED
ENTEROCOCCUS BY PCR: NOT DETECTED
ESCHERICHIA COLI BY PCR: NOT DETECTED
GFR AFRICAN AMERICAN: >60
GFR AFRICAN AMERICAN: >60
GFR NON-AFRICAN AMERICAN: 56 ML/MIN/1.73
GFR NON-AFRICAN AMERICAN: >60 ML/MIN/1.73
GLUCOSE BLD-MCNC: 158 MG/DL (ref 74–99)
GLUCOSE BLD-MCNC: 172 MG/DL (ref 74–99)
HAEMOPHILUS INFLUENZAE BY PCR: NOT DETECTED
HBA1C MFR BLD: 8.1 % (ref 4–5.6)
HCT VFR BLD CALC: 29.8 % (ref 34–48)
HEMOGLOBIN: 9.9 G/DL (ref 11.5–15.5)
KLEBSIELLA OXYTOCA BY PCR: NOT DETECTED
KLEBSIELLA PNEUMONIAE GROUP BY PCR: NOT DETECTED
LACTIC ACID, SEPSIS: 0.9 MMOL/L (ref 0.5–1.9)
LISTERIA MONOCYTOGENES BY PCR: NOT DETECTED
MAGNESIUM: 1.4 MG/DL (ref 1.6–2.6)
MCH RBC QN AUTO: 31.4 PG (ref 26–35)
MCHC RBC AUTO-ENTMCNC: 33.2 % (ref 32–34.5)
MCV RBC AUTO: 94.6 FL (ref 80–99.9)
METER GLUCOSE: 130 MG/DL (ref 74–99)
METER GLUCOSE: 143 MG/DL (ref 74–99)
METER GLUCOSE: 163 MG/DL (ref 74–99)
METER GLUCOSE: 213 MG/DL (ref 74–99)
METHICILLIN RESISTANCE MECA/C  BY PCR: NOT DETECTED
NEISSERIA MENINGITIDIS BY PCR: NOT DETECTED
ORDER NUMBER: ABNORMAL
PDW BLD-RTO: 13.2 FL (ref 11.5–15)
PHOSPHORUS: 3.4 MG/DL (ref 2.5–4.5)
PLATELET # BLD: 145 E9/L (ref 130–450)
PMV BLD AUTO: 9.9 FL (ref 7–12)
POTASSIUM SERPL-SCNC: 3.5 MMOL/L (ref 3.5–5)
POTASSIUM SERPL-SCNC: 4 MMOL/L (ref 3.5–5)
PROTEUS BY PCR: NOT DETECTED
PSEUDOMONAS AERUGINOSA BY PCR: NOT DETECTED
RBC # BLD: 3.15 E12/L (ref 3.5–5.5)
SERRATIA MARCESCENS BY PCR: NOT DETECTED
SODIUM BLD-SCNC: 137 MMOL/L (ref 132–146)
SODIUM BLD-SCNC: 141 MMOL/L (ref 132–146)
SOURCE OF BLOOD CULTURE: ABNORMAL
STAPHYLOCOCCUS AUREUS BY PCR: NOT DETECTED
STAPHYLOCOCCUS SPECIES BY PCR: DETECTED
STREPTOCOCCUS AGALACTIAE BY PCR: NOT DETECTED
STREPTOCOCCUS PNEUMONIAE BY PCR: NOT DETECTED
STREPTOCOCCUS PYOGENES  BY PCR: NOT DETECTED
STREPTOCOCCUS SPECIES BY PCR: NOT DETECTED
TOTAL CK: 1163 U/L (ref 20–180)
TOTAL PROTEIN: 6.1 G/DL (ref 6.4–8.3)
WBC # BLD: 6.3 E9/L (ref 4.5–11.5)

## 2020-11-18 PROCEDURE — 0202U NFCT DS 22 TRGT SARS-COV-2: CPT

## 2020-11-18 PROCEDURE — 99223 1ST HOSP IP/OBS HIGH 75: CPT | Performed by: INTERNAL MEDICINE

## 2020-11-18 PROCEDURE — 99233 SBSQ HOSP IP/OBS HIGH 50: CPT | Performed by: INTERNAL MEDICINE

## 2020-11-18 PROCEDURE — 82550 ASSAY OF CK (CPK): CPT

## 2020-11-18 PROCEDURE — 80053 COMPREHEN METABOLIC PANEL: CPT

## 2020-11-18 PROCEDURE — 97165 OT EVAL LOW COMPLEX 30 MIN: CPT

## 2020-11-18 PROCEDURE — 83735 ASSAY OF MAGNESIUM: CPT

## 2020-11-18 PROCEDURE — 1200000000 HC SEMI PRIVATE

## 2020-11-18 PROCEDURE — 2580000003 HC RX 258: Performed by: CLINICAL NURSE SPECIALIST

## 2020-11-18 PROCEDURE — 97530 THERAPEUTIC ACTIVITIES: CPT

## 2020-11-18 PROCEDURE — 6370000000 HC RX 637 (ALT 250 FOR IP): Performed by: NURSE PRACTITIONER

## 2020-11-18 PROCEDURE — 85027 COMPLETE CBC AUTOMATED: CPT

## 2020-11-18 PROCEDURE — 6360000002 HC RX W HCPCS: Performed by: CLINICAL NURSE SPECIALIST

## 2020-11-18 PROCEDURE — 2580000003 HC RX 258: Performed by: INTERNAL MEDICINE

## 2020-11-18 PROCEDURE — 97530 THERAPEUTIC ACTIVITIES: CPT | Performed by: PHYSICAL THERAPIST

## 2020-11-18 PROCEDURE — 6360000002 HC RX W HCPCS: Performed by: NURSE PRACTITIONER

## 2020-11-18 PROCEDURE — 80048 BASIC METABOLIC PNL TOTAL CA: CPT

## 2020-11-18 PROCEDURE — 6360000002 HC RX W HCPCS: Performed by: INTERNAL MEDICINE

## 2020-11-18 PROCEDURE — 36415 COLL VENOUS BLD VENIPUNCTURE: CPT

## 2020-11-18 PROCEDURE — 82962 GLUCOSE BLOOD TEST: CPT

## 2020-11-18 PROCEDURE — 83036 HEMOGLOBIN GLYCOSYLATED A1C: CPT

## 2020-11-18 PROCEDURE — 87040 BLOOD CULTURE FOR BACTERIA: CPT

## 2020-11-18 PROCEDURE — 6370000000 HC RX 637 (ALT 250 FOR IP): Performed by: INTERNAL MEDICINE

## 2020-11-18 PROCEDURE — 97161 PT EVAL LOW COMPLEX 20 MIN: CPT | Performed by: PHYSICAL THERAPIST

## 2020-11-18 PROCEDURE — 83605 ASSAY OF LACTIC ACID: CPT

## 2020-11-18 PROCEDURE — 84100 ASSAY OF PHOSPHORUS: CPT

## 2020-11-18 RX ORDER — ISOSORBIDE MONONITRATE 30 MG/1
30 TABLET, EXTENDED RELEASE ORAL DAILY
Status: DISCONTINUED | OUTPATIENT
Start: 2020-11-18 | End: 2020-11-20 | Stop reason: HOSPADM

## 2020-11-18 RX ORDER — PANTOPRAZOLE SODIUM 40 MG/1
40 TABLET, DELAYED RELEASE ORAL DAILY
Status: DISCONTINUED | OUTPATIENT
Start: 2020-11-18 | End: 2020-11-20 | Stop reason: HOSPADM

## 2020-11-18 RX ORDER — GABAPENTIN 400 MG/1
400 CAPSULE ORAL 3 TIMES DAILY
Status: DISCONTINUED | OUTPATIENT
Start: 2020-11-18 | End: 2020-11-20

## 2020-11-18 RX ORDER — NICOTINE POLACRILEX 4 MG
15 LOZENGE BUCCAL PRN
Status: DISCONTINUED | OUTPATIENT
Start: 2020-11-18 | End: 2020-11-20 | Stop reason: HOSPADM

## 2020-11-18 RX ORDER — SODIUM CHLORIDE 0.9 % (FLUSH) 0.9 %
10 SYRINGE (ML) INJECTION PRN
Status: DISCONTINUED | OUTPATIENT
Start: 2020-11-18 | End: 2020-11-20 | Stop reason: HOSPADM

## 2020-11-18 RX ORDER — METOPROLOL TARTRATE 50 MG/1
100 TABLET, FILM COATED ORAL 2 TIMES DAILY
Status: DISCONTINUED | OUTPATIENT
Start: 2020-11-18 | End: 2020-11-20 | Stop reason: HOSPADM

## 2020-11-18 RX ORDER — ACETAMINOPHEN 325 MG/1
650 TABLET ORAL EVERY 6 HOURS PRN
Status: DISCONTINUED | OUTPATIENT
Start: 2020-11-18 | End: 2020-11-20 | Stop reason: HOSPADM

## 2020-11-18 RX ORDER — POLYETHYLENE GLYCOL 3350 17 G/17G
17 POWDER, FOR SOLUTION ORAL DAILY PRN
Status: DISCONTINUED | OUTPATIENT
Start: 2020-11-18 | End: 2020-11-20 | Stop reason: HOSPADM

## 2020-11-18 RX ORDER — SUCRALFATE 1 G/1
1 TABLET ORAL 3 TIMES DAILY
Status: DISCONTINUED | OUTPATIENT
Start: 2020-11-18 | End: 2020-11-20 | Stop reason: HOSPADM

## 2020-11-18 RX ORDER — ONDANSETRON 2 MG/ML
4 INJECTION INTRAMUSCULAR; INTRAVENOUS EVERY 6 HOURS PRN
Status: DISCONTINUED | OUTPATIENT
Start: 2020-11-18 | End: 2020-11-20 | Stop reason: HOSPADM

## 2020-11-18 RX ORDER — DEXTROSE MONOHYDRATE 25 G/50ML
12.5 INJECTION, SOLUTION INTRAVENOUS PRN
Status: DISCONTINUED | OUTPATIENT
Start: 2020-11-18 | End: 2020-11-18 | Stop reason: SDUPTHER

## 2020-11-18 RX ORDER — PROMETHAZINE HYDROCHLORIDE 25 MG/1
12.5 TABLET ORAL EVERY 6 HOURS PRN
Status: DISCONTINUED | OUTPATIENT
Start: 2020-11-18 | End: 2020-11-20 | Stop reason: HOSPADM

## 2020-11-18 RX ORDER — ACETAMINOPHEN 650 MG/1
650 SUPPOSITORY RECTAL EVERY 6 HOURS PRN
Status: DISCONTINUED | OUTPATIENT
Start: 2020-11-18 | End: 2020-11-20 | Stop reason: HOSPADM

## 2020-11-18 RX ORDER — DEXTROSE MONOHYDRATE 25 G/50ML
12.5 INJECTION, SOLUTION INTRAVENOUS PRN
Status: DISCONTINUED | OUTPATIENT
Start: 2020-11-18 | End: 2020-11-20 | Stop reason: HOSPADM

## 2020-11-18 RX ORDER — DEXTROSE MONOHYDRATE 50 MG/ML
100 INJECTION, SOLUTION INTRAVENOUS PRN
Status: DISCONTINUED | OUTPATIENT
Start: 2020-11-18 | End: 2020-11-20 | Stop reason: HOSPADM

## 2020-11-18 RX ORDER — OXYCODONE AND ACETAMINOPHEN 10; 325 MG/1; MG/1
1 TABLET ORAL EVERY 6 HOURS PRN
Status: DISCONTINUED | OUTPATIENT
Start: 2020-11-18 | End: 2020-11-20 | Stop reason: HOSPADM

## 2020-11-18 RX ORDER — SODIUM CHLORIDE 450 MG/100ML
1000 INJECTION, SOLUTION INTRAVENOUS CONTINUOUS
Status: DISCONTINUED | OUTPATIENT
Start: 2020-11-18 | End: 2020-11-19

## 2020-11-18 RX ORDER — AMLODIPINE BESYLATE 5 MG/1
2.5 TABLET ORAL DAILY
Status: DISCONTINUED | OUTPATIENT
Start: 2020-11-18 | End: 2020-11-19

## 2020-11-18 RX ORDER — NICOTINE POLACRILEX 4 MG
15 LOZENGE BUCCAL PRN
Status: DISCONTINUED | OUTPATIENT
Start: 2020-11-18 | End: 2020-11-18 | Stop reason: SDUPTHER

## 2020-11-18 RX ORDER — INSULIN GLARGINE 100 [IU]/ML
66 INJECTION, SOLUTION SUBCUTANEOUS DAILY
Status: DISCONTINUED | OUTPATIENT
Start: 2020-11-18 | End: 2020-11-20 | Stop reason: HOSPADM

## 2020-11-18 RX ORDER — MAGNESIUM SULFATE IN WATER 40 MG/ML
2 INJECTION, SOLUTION INTRAVENOUS ONCE
Status: COMPLETED | OUTPATIENT
Start: 2020-11-18 | End: 2020-11-18

## 2020-11-18 RX ORDER — ONDANSETRON 4 MG/1
8 TABLET, FILM COATED ORAL EVERY 8 HOURS PRN
Status: DISCONTINUED | OUTPATIENT
Start: 2020-11-18 | End: 2020-11-20 | Stop reason: HOSPADM

## 2020-11-18 RX ORDER — LOSARTAN POTASSIUM 50 MG/1
100 TABLET ORAL DAILY
Status: DISCONTINUED | OUTPATIENT
Start: 2020-11-18 | End: 2020-11-20 | Stop reason: HOSPADM

## 2020-11-18 RX ORDER — DEXTROSE MONOHYDRATE 50 MG/ML
100 INJECTION, SOLUTION INTRAVENOUS PRN
Status: DISCONTINUED | OUTPATIENT
Start: 2020-11-18 | End: 2020-11-18 | Stop reason: SDUPTHER

## 2020-11-18 RX ORDER — SODIUM CHLORIDE 0.9 % (FLUSH) 0.9 %
10 SYRINGE (ML) INJECTION EVERY 12 HOURS SCHEDULED
Status: DISCONTINUED | OUTPATIENT
Start: 2020-11-18 | End: 2020-11-20 | Stop reason: HOSPADM

## 2020-11-18 RX ADMIN — METOPROLOL TARTRATE 100 MG: 50 TABLET, FILM COATED ORAL at 21:51

## 2020-11-18 RX ADMIN — INSULIN LISPRO 1 UNITS: 100 INJECTION, SOLUTION INTRAVENOUS; SUBCUTANEOUS at 18:28

## 2020-11-18 RX ADMIN — SODIUM CHLORIDE, PRESERVATIVE FREE 10 ML: 5 INJECTION INTRAVENOUS at 21:52

## 2020-11-18 RX ADMIN — GABAPENTIN 400 MG: 400 CAPSULE ORAL at 21:51

## 2020-11-18 RX ADMIN — OXYCODONE HYDROCHLORIDE AND ACETAMINOPHEN 1 TABLET: 10; 325 TABLET ORAL at 19:38

## 2020-11-18 RX ADMIN — ENOXAPARIN SODIUM 40 MG: 40 INJECTION SUBCUTANEOUS at 10:36

## 2020-11-18 RX ADMIN — SUCRALFATE 1 G: 1 TABLET ORAL at 14:30

## 2020-11-18 RX ADMIN — OXYCODONE HYDROCHLORIDE AND ACETAMINOPHEN 1 TABLET: 10; 325 TABLET ORAL at 10:46

## 2020-11-18 RX ADMIN — SUCRALFATE 1 G: 1 TABLET ORAL at 10:34

## 2020-11-18 RX ADMIN — SUCRALFATE 1 G: 1 TABLET ORAL at 21:51

## 2020-11-18 RX ADMIN — MEROPENEM 1 G: 1 INJECTION, POWDER, FOR SOLUTION INTRAVENOUS at 16:58

## 2020-11-18 RX ADMIN — PANTOPRAZOLE SODIUM 40 MG: 40 TABLET, DELAYED RELEASE ORAL at 10:35

## 2020-11-18 RX ADMIN — GABAPENTIN 400 MG: 400 CAPSULE ORAL at 14:30

## 2020-11-18 RX ADMIN — METOPROLOL TARTRATE 100 MG: 50 TABLET, FILM COATED ORAL at 10:35

## 2020-11-18 RX ADMIN — MAGNESIUM SULFATE 2 G: 2 INJECTION INTRAVENOUS at 18:27

## 2020-11-18 RX ADMIN — VANCOMYCIN HYDROCHLORIDE 1500 MG: 10 INJECTION, POWDER, LYOPHILIZED, FOR SOLUTION INTRAVENOUS at 14:30

## 2020-11-18 RX ADMIN — ISOSORBIDE MONONITRATE 30 MG: 30 TABLET, EXTENDED RELEASE ORAL at 10:35

## 2020-11-18 RX ADMIN — INSULIN LISPRO 1 UNITS: 100 INJECTION, SOLUTION INTRAVENOUS; SUBCUTANEOUS at 21:52

## 2020-11-18 RX ADMIN — LOSARTAN POTASSIUM 100 MG: 50 TABLET, FILM COATED ORAL at 10:35

## 2020-11-18 RX ADMIN — GABAPENTIN 400 MG: 400 CAPSULE ORAL at 10:35

## 2020-11-18 RX ADMIN — INSULIN GLARGINE 66 UNITS: 100 INJECTION, SOLUTION SUBCUTANEOUS at 10:36

## 2020-11-18 RX ADMIN — SODIUM CHLORIDE 1000 ML: 4.5 INJECTION, SOLUTION INTRAVENOUS at 14:30

## 2020-11-18 RX ADMIN — AMLODIPINE BESYLATE 2.5 MG: 5 TABLET ORAL at 10:35

## 2020-11-18 ASSESSMENT — PAIN SCALES - GENERAL
PAINLEVEL_OUTOF10: 10
PAINLEVEL_OUTOF10: 8
PAINLEVEL_OUTOF10: 3

## 2020-11-18 NOTE — PROGRESS NOTES
Physical Therapy    Physical Therapy Initial Evaluation    Room #:  1143/5569-29  Patient Name: Hudson Gonzalez  YOB: 1955  MRN: [id number]    Referring Provider:   MARJ Justice CNP     Date of Service: 11/18/2020    Evaluating Physical Therapist: Matthieu Galarza, PT  #[id number]       Diagnosis:   Rhabdomyolysis [M62.82]  Rhabdomyolysis [M62.82]   Admitted after  found on floor    Patient Active Problem List   Diagnosis    Acute leukemia not having achieved remission ([address]    Generalized weakness    Hypomagnesemia    Gait disorder    Impaired ambulation    Type 2 diabetes mellitus with diabetic autonomic neuropathy, with long-term current use of insulin ([address]    Stasis dermatitis with venous ulcer of lower extremity due to chronic peripheral venous hypertension ([address]    Venous insufficiency of both lower extremities    Essential hypertension    Coronary artery disease involving native coronary artery of native heart without angina pectoris    Chest pain    Rhabdomyolysis        Tentative placement recommendation: Inpatient Rehab    Equipment recommendation:  To be determined      Prior Level of Function: pt not able to clearly describe  Rehab Potential: fair  for baseline    Past medical history:   Past Medical History:   Diagnosis Date    AML (acute myeloblastic leukemia) ([address]     CAD (coronary artery disease)     GERD (gastroesophageal reflux disease)     Hyperlipidemia     Hypertension     Obesity     Osteoarthritis     Type 2 diabetes mellitus without complication (HCC)      Past Surgical History:   Procedure Laterality Date    CARDIAC CATHETERIZATION      CHOLECYSTECTOMY      DILATION AND CURETTAGE OF UTERUS      FOOT SURGERY Left        Precautions: Up as tolerated, falls, alarm, rule out COVID-19 and low air loss bed ,      SUBJECTIVE:    Social history: Patient lives with spouse and son in a ranch home  with 1 step  to enter without Rail  Walk in not assessed    Sitting EOB:  good    Dynamic Standing: fair       Patient is Alert & Oriented x person and follows one step directions  Loses train of thought, difficulty finding words  Sensation:  Patient  denies numbness and tingling     Edema:   yes  Endurance: poor         Patient education  Patient educated on role of Physical Therapy, risks of immobility, safety and plan of care      Patient response to education:   Pt verbalized understanding Pt demonstrated skill Pt requires further education in this area   No No Yes      Treatment:  Patient practiced and was instructed/facilitated in the following treatment: Patient   Sat edge of bed 10 minutes with Minimal assist of 1 to increase dynamic sitting balance and activity tolerance. Therapeutic Exercises:  ankle pumps  x 10 reps. At end of session, patient in bed with  call light and phone within reach,   all lines and tubes intact, nursing notified. Patient would benefit from continued skilled Physical Therapy to improve functional independence and quality of life. Patient's/ family goals   none stated        ASSESSMENT: Patient exhibits decreased strength, balance, coordination impairing functional mobility.    Decreased cognition, endurance and motor recruitment to initiate mobility impact patient's level of independence    Plan of Care:     -Bed Mobility: Lower extremity exercises , Upper extremity exercises  and Trunk control activities   -Sitting Balance: Incorporate reaching activities to activate trunk muscles   -Standing Balance: Perform strengthening exercises in standing to promote motor control with or without upper extremity support   -Transfers: Provide instruction on proper hand and foot position for adequate transfer of weight onto lower extremities and use of gait device, Cues for hand placement, technique and safety, Facilitate weight shift forward on to lower extremities and provide necessary stabilization of bilateral lower extremities , Support transfer of weight on to lower extremities, Assist with extension of knees trunk and hip to accept weight transfer  and Provide stabilization to prevent fall   -Gait: Gait training and Standing activities to improve: base of support, weight shift, weight bearing      Patient and or family understand(s) diagnosis, prognosis, and plan of care. Frequency of treatments: Patient will be seen    daily. Time in  1237  Time out  1258    Total Treatment Time  15 minutes    Evaluation time includes thorough review of current medical information, gathering information on past medical history/social history and prior level of function, completion of standardized testing/informal observation of tasks, assessment of data, and development of Plan of care and goals.      CPT codes:  Low Complexity PT evaluation (70618)  Therapeutic activities (08227)   15 minutes  1 unit(s)    Kathi Ramirez PT

## 2020-11-18 NOTE — PROGRESS NOTES
[address] [place], [place] [address]ist   Progress Note    Admitting Date and Time: 11/17/2020 10:05 AM  Admit Dx: Rhabdomyolysis [M62.82]  Rhabdomyolysis [M62.82]    Subjective:    Patient seen and examined. She is alert and oriented, but delayed in her responses. When asked about events leading up to admission, she was unclear in her explanation. ROS: denies fever, chills, cp, sob, n/v, HA unless stated above.      amLODIPine  2.5 mg Oral Daily    gabapentin  400 mg Oral TID    [Held by provider] insulin lispro  24 Units Subcutaneous TID AC    isosorbide mononitrate  30 mg Oral Daily    losartan  100 mg Oral Daily    metoprolol  100 mg Oral BID    pantoprazole  40 mg Oral Daily    sucralfate  1 g Oral TID    sodium chloride flush  10 mL Intravenous 2 times per day    enoxaparin  40 mg Subcutaneous Daily    insulin glargine  66 Units Subcutaneous Daily    insulin lispro  0-6 Units Subcutaneous TID WC    insulin lispro  0-3 Units Subcutaneous Nightly    meropenem  1 g Intravenous Q8H     ondansetron, 8 mg, Q8H PRN  oxyCODONE-acetaminophen, 1 tablet, Q6H PRN  sodium chloride flush, 10 mL, PRN  acetaminophen, 650 mg, Q6H PRN    Or  acetaminophen, 650 mg, Q6H PRN  polyethylene glycol, 17 g, Daily PRN  promethazine, 12.5 mg, Q6H PRN    Or  ondansetron, 4 mg, Q6H PRN  glucose, 15 g, PRN  dextrose, 12.5 g, PRN  glucagon (rDNA), 1 mg, PRN  dextrose, 100 mL/hr, PRN         Objective:    BP (!) 148/69   Pulse 76   Temp 98.4 °F (36.9 °C) (Oral)   Resp 18   Ht 5' 9\" (1.753 m)   Wt (!) 360 lb 6 oz (163.5 kg)   SpO2 93%   BMI 53.22 kg/m²   General Appearance: alert and oriented to person, place and time and in no acute distress  Skin: warm and dry, pale, lower extremities with vascular changes, dry and scaly  Head: normocephalic and atraumatic  Eyes: pupils equal, round, and reactive to light, extraocular eye movements intact, conjunctivae normal  Neck: neck supple and non tender without mass Continue Lantus, hold mealtime insulin due to low blood sugars. Start insulin sliding scale, hold home metformin. Blood sugar checks, hypoglycemic protocol. 6. Diabetic neuropathy: Continue gabapentin. 7. Statis dermatitis with venous ulcer of lower extremity:  Consult Wound Care. 8. AML:  Bone marrow biopsy at [facility] on 11/23/18 revealed AML. S/p chemotherapy. She had a bone marrow biopsy on 3/22/19 that confirmed complete remission by histology, flow and FISH studies. 9. Antral gastritis:  EGD on 9/1/20 with findings of antral gastritis. Continue PPI and Carafate. 10. MYKEL:  Creatinine is 1.3 and was previously 1.1 in September. Continue IV fluids. 11. Weakness:  PT/OT. NOTE: This report was transcribed using voice recognition software. Every effort was made to ensure accuracy; however, inadvertent computerized transcription errors may be present.      Electronically signed by MARJ Euceda CNP on 11/18/2020 at 11:30 AM

## 2020-11-18 NOTE — H&P
[address] [place], [place] [address]ist Group   History and Physical      CHIEF COMPLAINT:  Found on the floor    History of Present Illness:  72 y.o. female with a history of hypertension, diabetes mellitus and diabetic neuropathy found in the floor. Her  called to EMS, and EMS found her on the floor as per EMS patient was lying on the floor for 2 days with minimal feces in her feces around. She was too weak to get up, as per patient she was weak for a week she could not go her wound appointment. During my encounter she was lying on the bed and answering all the questions slowly. She is not in acute distress. Her vitals currently blood pressure 103/61, pulse 99, respiratory 24 and temperature 97.9. Her lab sodium 141, potassium 3.8 creatinine 1.3, BUN 34, total CK 1741, albumin 3.3 and needed examination shows WBC count 10-20 and RBC 5-10. CT head did not show any abnormality. Informant(s) for H&P: Sent in EMR    REVIEW OF SYSTEMS:  no fevers, chills, cp, sob, n/v, ha, vision/hearing changes, wt changes, hot/cold flashes, other open skin lesions, diarrhea, constipation, dysuria/hematuria unless noted in HPI. Complete ROS performed with the patient and is otherwise negative. PMH:  Past Medical History:   Diagnosis Date    CAD (coronary artery disease)     [facility] ([facility] [address]     GERD (gastroesophageal reflux disease)     Hyperlipidemia     Hypertension     Obesity     Osteoarthritis     Type 2 diabetes mellitus without complication ([facility] [address]        Surgical History:  Past Surgical History:   Procedure Laterality Date    CARDIAC CATHETERIZATION      CHOLECYSTECTOMY      DILATION AND CURETTAGE OF UTERUS      FOOT SURGERY Left        Medications Prior to Admission:    Prior to Admission medications    Medication Sig Start Date End Date Taking?  Authorizing Provider   Insulin Degludec (TRESIBA FLEXTOUCH) 100 UNIT/ML SOPN Inject 66 Units into the skin daily   Yes Historical Provider, MD insulin lispro (HUMALOG) 100 UNIT/ML injection vial Inject 24 Units into the skin 3 times daily (before meals)   Yes Historical Provider, MD   amLODIPine (NORVASC) 2.5 MG tablet Take 2.5 mg by mouth daily   Yes Historical Provider, MD   ondansetron (ZOFRAN) 8 MG tablet Take 8 mg by mouth every 8 hours as needed for Nausea or Vomiting   Yes Historical Provider, MD   influenza virus trivalent vaccine (FLUZONE) injection Inject 0.5 mLs into the muscle once Given 10/2020 w/ PCP   Yes Historical Provider, MD   oxyCODONE-acetaminophen (PERCOCET)  MG per tablet Take 1 tablet by mouth every 6 hours as needed for Pain (hold for sedation) for up to 30 days. 11/2/20 12/2/20 Yes Janina Martin,    atorvastatin (LIPITOR) 40 MG tablet Take 40 mg by mouth daily  8/7/20  Yes Historical Provider, MD   sucralfate (CARAFATE) 1 GM tablet Take 1 tablet by mouth 3 times daily 9/1/20  Yes Historical Provider, MD   gabapentin (NEURONTIN) 400 MG capsule Take 1 capsule by mouth 3 times daily for 360 days. 1/17/20 1/11/21 Yes Gordon Tapia, APRN - CNP   senna (SENOKOT) 8.6 MG tablet Take 1 tablet by mouth daily as needed for Constipation   Yes Historical Provider, MD   Skin Protectants, Misc.  (ALOE VESTA PROTECTIVE) OINT ointment Apply 1 drop topically 2 times daily as needed (dry skin) 7/15/19  Yes Badi Altawil, MD   magnesium oxide (MAG-OX) 400 MG tablet Take 400 mg by mouth daily    Yes Historical Provider, MD   pantoprazole (PROTONIX) 40 MG tablet Take 40 mg by mouth daily   Yes Historical Provider, MD   metoprolol (LOPRESSOR) 100 MG tablet Take 100 mg by mouth 2 times daily   Yes Historical Provider, MD   metFORMIN (GLUCOPHAGE) 1000 MG tablet Take 1,000 mg by mouth 2 times daily (with meals)   Yes Historical Provider, MD   isosorbide mononitrate (IMDUR) 30 MG extended release tablet Take 30 mg by mouth daily   Yes Historical Provider, MD   losartan (COZAAR) 100 MG tablet Take 100 mg by mouth daily   Yes Historical Provider, reconstruction, and/or weight based adjustment of the mA/kV was utilized to reduce the radiation dose to as low as reasonably achievable. COMPARISON: 10/29/2019 HISTORY: ORDERING SYSTEM PROVIDED HISTORY: AMS and generalized weakness, several days TECHNOLOGIST PROVIDED HISTORY: Reason for exam:->AMS and generalized weakness, several days Has a \"code stroke\" or \"stroke alert\" been called? ->No FINDINGS: Atherosclerotic calcified plaque in both carotid and vertebral arteries. No acute fluid level visualized in the included air-filled sinuses. Bone windows demonstrate no acute fracture. There is ventricular and sulcal prominence compatible with age-appropriate global cerebral cortical atrophy. The brain contains no mass, mass effect, hemorrhage, or acute infarct. There is no extra-axial intracranial bleed, brain bleed, or midline shift. No acute CVA, intracranial bleed, or brain mass. Xr Chest 1 View    Result Date: 11/17/2020  EXAMINATION: ONE XRAY VIEW OF THE CHEST 11/17/2020 11:56 am COMPARISON: 10/29/2019 HISTORY: ORDERING SYSTEM PROVIDED HISTORY: AMS, down x2 days TECHNOLOGIST PROVIDED HISTORY: Reason for exam:->AMS, down x2 days FINDINGS: The lungs are without acute focal process. There is no effusion or pneumothorax. The cardiomediastinal silhouette is without acute process. The osseous structures are without acute process. No acute process. EKG: Sinus rhythm    ASSESSMENT:      Active Problems:    Rhabdomyolysis  Resolved Problems:    * No resolved hospital problems. *      PLAN:  1. Rhabdomyolysis with MYKEL: Continue IV fluid and follow CPK, her current CPK 1 741, follow-up BMP. 2.  Urinary tract infection: Continue IV ceftriaxone and follow urine culture. 3.  Diabetes mellitus: Continue her Lantus and insulin coverage follow hemoglobin A1c.  4.  Hypertension: Continue amlodipine losartan monitor blood pressure. 5.  Diabetic neuropathy: Continue gabapentin.   6.  Hypoalbuminemia: Continue supplementation of protein. Code Status: Full  DVT prophylaxis: Lovenox subcu    NOTE: This report was transcribed using voice recognition software.  Every effort was made to ensure accuracy; however, inadvertent computerized transcription errors may be present.     Electronically signed by Rafael Mason MD on 11/18/2020 at 6:57 AM

## 2020-11-18 NOTE — PLAN OF CARE
Problem: Pain:  Goal: Pain level will decrease  Description: Pain level will decrease  Outcome: Met This Shift     Problem: Pain:  Goal: Control of acute pain  Description: Control of acute pain  Outcome: Met This Shift     Problem: Pain:  Goal: Control of chronic pain  Description: Control of chronic pain  Outcome: Met This Shift     Problem: Skin Integrity:  Goal: Will show no infection signs and symptoms  Description: Will show no infection signs and symptoms  Outcome: Met This Shift     Problem: Skin Integrity:  Goal: Absence of new skin breakdown  Description: Absence of new skin breakdown  Outcome: Met This Shift     Problem: Falls - Risk of:  Goal: Will remain free from falls  Description: Will remain free from falls  Outcome: Met This Shift     Problem: Falls - Risk of:  Goal: Absence of physical injury  Description: Absence of physical injury  Outcome: Met This Shift     Problem: Discharge Planning:  Goal: Ability to perform activities of daily living will improve  Description: Ability to perform activities of daily living will improve  Outcome: Met This Shift     Problem: Discharge Planning:  Goal: Participates in care planning  Description: Participates in care planning  Outcome: Met This Shift     Problem: Injury - Risk of, Physical Injury:  Goal: Will remain free from falls  Description: Will remain free from falls  Outcome: Met This Shift

## 2020-11-18 NOTE — PROGRESS NOTES
Occupational Therapy  OCCUPATIONAL THERAPY INITIAL EVALUATION      Date:2020  Patient Name: Jacki Wiggins  MRN: [id number]  : 1955  Room: [address] [place] [address]    Referring Provider:   MARJ Sena CNP         Evaluating OT: Una Solorzano OTR/L #[id number]    AM-PAC Daily Activity Raw Score:     Recommended Placement: Subacute   Recommended Adaptive Equipment: TBD     Diagnosis:   1. Non-traumatic rhabdomyolysis    2. General weakness    3. Dehydration    4. Disorientation    5. Urinary tract infection with hematuria, site unspecified        Surgery: N/A      Pertinent Medical History:    Past Medical History:   Diagnosis Date    CAD (coronary artery disease)     [facility] [address]     GERD (gastroesophageal reflux disease)     Hyperlipidemia     Hypertension     Obesity     Osteoarthritis     Type 2 diabetes mellitus without complication ([facility])       Precautions:  Falls, bariatric bed, questionable historian, word finding difficulty     Home Living: Pt lives  and son, 1 story home with 1 CHINEDU with 0 rail(s). Bathroom setup: walk in shower   Equipment owned: rollator, shower chair, [facility], [employer]     Prior Level of Function: Mod I with ADLs , Assist with IADLs; ambulated with rollator, cant remember last time she walked. Driving: Not reported    Pain Level: -/10  Cognition: A&O: 2/4; Follows 1 step directions with cuing. Memory:  Good   Sequencing:  Good   Problem solving:  Fair+   Judgement/safety:  Fair+     Functional Assessment:   Initial Eval Status  Date: 20 Treatment Status  Date: STGs = LTGs  Time frame: 5-7 days   Feeding Independent        Grooming Moderate Assist     Minimal Assist    UB Dressing Moderate Assist   pulling sleeves to shoulders and tying back of gown while sitting EOB  Minimal Assist    LB Dressing Maximal Assist   Don socks at bed level, active assist with lifting heels off of bed.    Moderate Assist    Bathing Maximal Assist    Moderate Assist Toileting Maximal Assist   Using bedpan while supine, assist for hygiene and rolling. Minimal Assist    Bed Mobility  Supine to sit: Moderate Assist of 2  Sit to supine: Maximal Assist of 2   Rolling: Maximal Assist of 2  Supine to sit: Moderate Assist   Sit to supine: Moderate Assist    Functional Transfers Attempted 2x with 2 assist, unable to clear bed. Maximal Assist of 2   Functional Mobility NT      Maximal Assist of 2   Balance Sitting:     Static:  fair    Dynamic:fair  Standing: NT  Sitting:     Static:  Fair+    Dynamic:Fair+  Standing: Fair   Activity Tolerance Fair-  Fair   Visual/  Perceptual    WFL       Hand Dominance Right     Strength ROM Additional Info:    RUE  4-/5  WFL good  and wfl FMC/dexterity noted during ADL tasks       LUE 4-/5  WFL good  and wfl FMC/dexterity noted during ADL tasks       Hearing: WFL   Sensation:  No c/o numbness or tingling   Tone: WFL   Edema: None noted    Comments:   Nursing approved therapy session. Upon arrival, patient supine in bed and agreeable to OT session. Therapist educated pt on role of OT. At end of session, patient supine in bed with call light and phone within reach, all lines and tubes intact; nursing aware. Pt required assist with word finding throughout session. Pt demonstrated fair understanding of education/techniques and decreased independence and safety during completion of ADL/functional transfer/mobility tasks. Pt would benefit from continued skilled OT to increase safety and independence with completion of ADL/IADL tasks for functional independence and quality of life. Treatment:   Skilled occupational therapy services provided include instruction/training on safety and adapted techniques for completion of therapeutic activities, ADLs/IADLs. Skilled monitoring of O2 sats, HR, and pt response throughout treatment.    Therapist facilitated therapeutic activities: bed mobility, functional transfers, graded functional activities (static/dynamic sitting, functional reaching) - providing mod cuing (verbal, visual, tactile) on hand placement, body mechanics, posture, breathing techniques, energy conservation, compensatory strategies, and safety.  Therapist facilitated self-care retraining: UB dressing, LB dressing, toileting tasks - providing mod cuing (verbal, visual, tactile) on body mechanics, posture, breathing techniques, energy conservation, compensatory strategies, and safety. Therapist educated pt on compensatory strategies/energy conservation techniques to safely complete ADLs/IADLs.      Eval Complexity: Low    Assessment of current deficits   Functional mobility [x]  ADLs [x] Strength [x]  Cognition [x]  Functional transfers  [x] IADLs [x] Safety Awareness [x]  Endurance [x]  Fine Motor Coordination [] Balance [x] Vision/perception [] Sensation []   Gross Motor Coordination [] ROM [] Delirium []                  Motor Control []    Plan of Care: 1-3 days/week for 1-2 weeks PRN   [x]ADL retraining/adapted techniques and AE recommendations to increase functional independence within precautions                    [x]Energy conservation techniques to improve tolerance for selfcare routine   [x]Functional transfer/mobility training/DME recommendations for increased independence, safety and fall prevention         [x]Patient/family education to increase safety and functional independence             [x]Environmental modifications for safe mobility and completion of ADLs                             []Cognitive retraining ex's to improve problem solving skills & safe participation in ADLs/IADLs     []Sensory re-education techniques to improve extremity awareness, maintain skin integrity and improve hand function                             []Visual/Perceptual retraining  to improve body awareness and safety during transfers and ADLs  []Splinting/positioning needs to maintain joint/skin integrity and prevent contractures  [x]Therapeutic activity to improve functional performance during ADLs. [x]Therapeutic exercise to improve tolerance and functional strength for ADLs   [x]Balance retraining/tolerance tasks for facilitation of postural control with dynamic challenges during ADLs . [x]Neuromuscular re-education: facilitation of righting/equilibrium reactions, midline orientation, scapular stability/mobility, Normalization muscle tone and facilitation active functional movement/Attention                         []Delirium prevention/treatment    []Positioning to improve functional independence  []Other:     Rehab Potential:  Good for established goals     Patient / Family Goal: Not reported      Patient and/or family were instructed on functional diagnosis, prognosis/goals and OT plan of care. Demonstrated fair understanding. Eval Complexity: Low      Time In: 1109  Time Out: 1118    Time in: 1237  Time out: 1259    Total Treatment Time: 8    Min Units   OT Eval Low 97165  X  1   OT Eval Medium 47551      OT Eval High 14126       OT Re-Eval [id number]       Therapeutic Ex 48342       Therapeutic Activities 56643  8 1    ADL/Self Care 89689       Orthotic Management 54352       Neuro Re-Ed 65531       Non-Billable Time          Evaluation Time includes thorough review of current medical information, gathering information on past medical history/social history and prior level of function, completion of standardized testing/informal observation of tasks, assessment of data and education on plan of care and goals.     Jessica Ravi, OTR/L #[id number]

## 2020-11-18 NOTE — CARE COORDINATION
Ss note:11/18/2020.2:48 PM  Respiratory panel to be sent out today. Per nursing pt is confused. SW contacted pts spouse Gayle Aviles for transition of care plan. Spouse relays pt would ambulate to kitchen & back and from bed to commode but had difficulty. Pt sleeps in a reclining chair, her weight is 360 lbs. Pt has hx of Vow To Be [facility] at [place] times 2, spouse requests referral there again. SW made referral to [facility], will await chart review and acceptance, requires PRECERT for placement. SW will follow.  JESUS Yoo

## 2020-11-18 NOTE — CONSULTS
[facility]  Consult Note    [address] [facility] [address]  [place], [address]  Phone [phone]   [phone]      Admit Date: 2020 10:05 AM  Pt Name: Aura Bautista  MRN: [id number]  : 1955  Reason for Consult:    Chief Complaint   Patient presents with    Fatigue     per EMS, patient c/o of increasing weakness over past few days, unable to move for past 2 days     Requesting Physician:  Maggy Cason MD  PCP: Ambrose Enrique MD  History Obtained From:  patient  ID consulted for Rhabdomyolysis [M62.82]  Rhabdomyolysis [M62.82]  on hospital day 1   Face to face encounter   [facility]       Chief Complaint   Patient presents with    Fatigue     per EMS, patient c/o of increasing weakness over past few days, unable to move for past 2 days   fatigue   HISTORYOF PRESENT ILLNESS      Aura Bautista is a 72 y.o. female who presents with significant past medical history of  has a past medical history of CAD (coronary artery disease), Cancer ([facility] [address] GERD (gastroesophageal reflux disease), Hyperlipidemia, Hypertension, Obesity, Osteoarthritis, and Type 2 diabetes mellitus without complication ([facility] [address] ED TRIAGEVITALS  BP: (!) 148/69, Temp: 98.4 °F (36.9 °C), Pulse: 76, Resp: 18, SpO2: 93 %  HPI  Patient was admitted for hospital for fatigue- she was found on the floor for possibly 2 days with feces around her. She denies any nausea, vomiting, fever, chills or diarrhea. No rash. She had a ellington placed in ER- + sediments noted. She is in no acute distress - she is on room air. No fevers. She is currently on the bed pan in bed. Patient denies any recent atbs use. She has a history of AML-   She had a blood cx- turn positive for GPC in clusters  She had history of ESBL+ in the past and also strep mitis in the past.   She had urine cx turn positive for E.coli.     REVIEW OF SYSTEMS    (2-9 systems for level 4, 10 or more for level 5) REVIEW OF SYSTEMS:    CONSTITUTIONAL:   No fever, chills, weight loss  ALLERGIES:    No urticaria, hay fever,    EYES:     No blurry vision, loss of vision,eye pain  ENT:      No hearing loss, sore throat  CARDIOVASCULAR:   No chest pain or palpitations, + hyperlipidemia, + HTN  RESPIRATORY:   No cough, sob  ENDOCRINE:    No increase thirst, urination , + DM  HEME-LYMPH:   No easy bruising or bleeding  GI:     No nausea, vomiting or diarrhea, + GERD  :     No urinary complaints  NEURO:    No seizures, stroke, HA  MUSCULOSKELETAL:  No muscle aches or pain, no jointpain  SKIN:     No rash or itch  PSYCH:    No depression or anxiety    Medications Prior to Admission: Insulin Degludec (TRESIBA FLEXTOUCH) 100 UNIT/ML SOPN, Inject 66 Units into the skin daily  insulin lispro (HUMALOG) 100 UNIT/ML injection vial, Inject 24 Units into the skin 3 times daily (before meals)  amLODIPine (NORVASC) 2.5 MG tablet, Take 2.5 mg by mouth daily  ondansetron (ZOFRAN) 8 MG tablet, Take 8 mg by mouth every 8 hours as needed for Nausea or Vomiting  influenza virus trivalent vaccine (FLUZONE) injection, Inject 0.5 mLs into the muscle once Given 10/2020 w/ PCP  oxyCODONE-acetaminophen (PERCOCET)  MG per tablet, Take 1 tablet by mouth every 6 hours as needed for Pain (hold for sedation) for up to 30 days. atorvastatin (LIPITOR) 40 MG tablet, Take 40 mg by mouth daily   sucralfate (CARAFATE) 1 GM tablet, Take 1 tablet by mouth 3 times daily  gabapentin (NEURONTIN) 400 MG capsule, Take 1 capsule by mouth 3 times daily for 360 days. senna (SENOKOT) 8.6 MG tablet, Take 1 tablet by mouth daily as needed for Constipation  Skin Protectants, Misc.  (ALOE VESTA PROTECTIVE) OINT ointment, Apply 1 drop topically 2 times daily as needed (dry skin)  magnesium oxide (MAG-OX) 400 MG tablet, Take 400 mg by mouth daily   pantoprazole (PROTONIX) 40 MG tablet, Take 40 mg by mouth daily  metoprolol (LOPRESSOR) 100 MG tablet, Take 100 mg by mouth 2 times daily  metFORMIN (GLUCOPHAGE) 1000 MG tablet, Take 1,000 mg by mouth 2 times daily (with meals)  isosorbide mononitrate (IMDUR) 30 MG extended release tablet, Take 30 mg by mouth daily  losartan (COZAAR) 100 MG tablet, Take 100 mg by mouth daily'  CURRENT MEDICATIONS     Current Facility-Administered Medications:     amLODIPine (NORVASC) tablet 2.5 mg, 2.5 mg, Oral, Daily, Rossy Rosario MD, 2.5 mg at 11/18/20 1035    gabapentin (NEURONTIN) capsule 400 mg, 400 mg, Oral, TID, Rossy Rosario MD, 400 mg at 11/18/20 1035    [Held by provider] insulin lispro (HUMALOG) injection vial 24 Units, 24 Units, Subcutaneous, TID AC, Rossy Rosario MD    isosorbide mononitrate (IMDUR) extended release tablet 30 mg, 30 mg, Oral, Daily, Rossy Rosario MD, 30 mg at 11/18/20 1035    losartan (COZAAR) tablet 100 mg, 100 mg, Oral, Daily, Rossy Rosario MD, 100 mg at 11/18/20 1035    metoprolol tartrate (LOPRESSOR) tablet 100 mg, 100 mg, Oral, BID, Rossy Rosario MD, 100 mg at 11/18/20 1035    ondansetron (ZOFRAN) tablet 8 mg, 8 mg, Oral, Q8H PRN, Rossy Rosario MD    oxyCODONE-acetaminophen (PERCOCET)  MG per tablet 1 tablet, 1 tablet, Oral, Q6H PRN, Rossy Rosario MD, 1 tablet at 11/18/20 1046    pantoprazole (PROTONIX) tablet 40 mg, 40 mg, Oral, Daily, Rossy Rosario MD, 40 mg at 11/18/20 1035    sucralfate (CARAFATE) tablet 1 g, 1 g, Oral, TID, Rossy Rosario MD, 1 g at 11/18/20 1034    0.45 % sodium chloride infusion, 1,000 mL, Intravenous, Continuous, Rossy Rosario MD    sodium chloride flush 0.9 % injection 10 mL, 10 mL, Intravenous, 2 times per day, Rossy Rosario MD    sodium chloride flush 0.9 % injection 10 mL, 10 mL, Intravenous, PRN, Rossy Rosario MD    acetaminophen (TYLENOL) tablet 650 mg, 650 mg, Oral, Q6H PRN **OR** acetaminophen (TYLENOL) suppository 650 mg, 650 mg, Rectal, Q6H PRN, Rossy Rosario MD    polyethylene glycol (GLYCOLAX) packet 17 g, 17 g, Oral, Daily PRN, Rossy Rosario MD    promethazine (PHENERGAN) tablet 12.5 mg, 12.5 mg, Oral, Q6H PRN **OR** ondansetron (ZOFRAN) injection 4 mg, 4 mg, Intravenous, Q6H PRN, Rossy Rosario MD    enoxaparin (LOVENOX) injection 40 mg, 40 mg, Subcutaneous, Daily, Rossy Rosario MD, 40 mg at 11/18/20 1036    insulin glargine (LANTUS) injection vial 66 Units, 66 Units, Subcutaneous, Daily, Rossy Rosario MD, 66 Units at 11/18/20 1036    glucose (GLUTOSE) 40 % oral gel 15 g, 15 g, Oral, PRN, Tawnya Passer, APRN - CNP    dextrose 50 % IV solution, 12.5 g, Intravenous, PRN, Tawnya Passer, APRN - CNP    glucagon (rDNA) injection 1 mg, 1 mg, Intramuscular, PRN, Tawnya Passer, APRN - CNP    dextrose 5 % solution, 100 mL/hr, Intravenous, PRN, Tawnya Passer, APRN - CNP    insulin lispro (HUMALOG) injection vial 0-6 Units, 0-6 Units, Subcutaneous, TID WC, Tawnya Passer, APRN - CNP    insulin lispro (HUMALOG) injection vial 0-3 Units, 0-3 Units, Subcutaneous, Nightly, Tawnya Passer, APRN - CNP    meropenem (MERREM) 1 g in sodium chloride 0.9 % 100 mL IVPB (mini-bag), 1 g, Intravenous, Q8H, MARJ Yuan - CNS  ALLERGIES     Patient has no known allergies. There is no immunization history on file for this patient.   PAST MEDICAL HISTORY     Past Medical History:   Diagnosis Date    CAD (coronary artery disease)     Cancer ([facility] [address]     GERD (gastroesophageal reflux disease)     Hyperlipidemia     Hypertension     Obesity     Osteoarthritis     Type 2 diabetes mellitus without complication (HCC)      SURGICAL HISTORY       Past Surgical History:   Procedure Laterality Date    CARDIAC CATHETERIZATION      CHOLECYSTECTOMY      DILATION AND CURETTAGE OF UTERUS      FOOT SURGERY Left      FAMILY HISTORY       Family History   Problem Relation Age of Onset    Coronary Art Dis Mother     Stroke Mother     Diabetes Mother     Coronary Art Dis Father     Diabetes Father     Diabetes Sister     Cancer Paternal Aunt         Bone     SOCIAL HISTORY Social History     Socioeconomic History    Marital status:      Spouse name: None    Number of children: None    Years of education: None    Highest education level: None   Occupational History    None   Social Needs    Financial resource strain: None    Food insecurity     Worry: None     Inability: None    Transportation needs     Medical: None     Non-medical: None   Tobacco Use    Smoking status: Never Smoker    Smokeless tobacco: Never Used   Substance and Sexual Activity    Alcohol use: No    Drug use: No    Sexual activity: Not Currently     Partners: Male   Lifestyle    Physical activity     Days per week: None     Minutes per session: None    Stress: None   Relationships    Social connections     Talks on phone: None     Gets together: None     Attends Mosque service: None     Active member of club or organization: None     Attends meetings of clubs or organizations: None     Relationship status: None    Intimate partner violence     Fear of current or ex partner: None     Emotionally abused: None     Physically abused: None     Forced sexual activity: None   Other Topics Concern    None   Social History Narrative    None     · From home  · Lives with  and son     PHYSICAL EXAM    (up to 7 for level 4, 8 or more forlevel 5)     ED Triage Vitals [11/17/20 1004]   BP Temp Temp Source Pulse Resp SpO2 Height Weight   (!) 167/93 97.3 °F (36.3 °C) Oral 103 22 96 % 5' 6\" (1.676 m) 300 lb (136.1 kg)     Vitals:    Vitals:    11/17/20 1227 11/17/20 1558 11/17/20 1646 11/18/20 0811   BP: (!) 147/78 (!) 149/69 103/61 (!) 148/69   Pulse: 97 104 99 76   Resp: 22 16 24 18   Temp:  98.4 °F (36.9 °C) 97.9 °F (36.6 °C) 98.4 °F (36.9 °C)   TempSrc:  Oral Oral Oral   SpO2: 95% 95% 94% 93%   Weight:   (!) 360 lb 6 oz (163.5 kg)    Height:   5' 9\" (1.753 m)      Physical Exam   Constitutional/General: Alert and oriented, NAD  Head: NC/AT  Eyes: PERRL, EOMI  Mouth: Normal mucosa, no exam:->AMS, down x2 days FINDINGS: The lungs are without acute focal process. There is no effusion or pneumothorax. The cardiomediastinal silhouette is without acute process. The osseous structures are without acute process. No acute process. LABS  Recent Labs     11/17/20  1046   WBC 7.6   HGB 11.9   HCT 34.8   MCV 93.8        Recent Labs     11/17/20  1046 11/17/20  1109     --    K 3.8  --      --    CO2 28  --    BUN 34*  --    CREATININE 1.3*  --    GFRAA 50  --    LABGLOM 41  --    GLUCOSE 101* 100   PROT 7.1  --    LABALBU 3.3*  --    CALCIUM 9.0  --    BILITOT 0.5  --    ALKPHOS 77  --    AST 49*  --    ALT 21  --       COVID-19/ANDRADE-COV2 LABS  Recent Labs     11/17/20  1046   TROPONINI 0.03   AST 49*   ALT 21        MICROBIOLOGY:     Cultures :   Lab Results   Component Value Date    [facility]  11/17/2020     Gram stain performed from blood culture bottle media  Gram positive cocci in clusters      BC 5 Days- no growth 07/11/2019    ORG Escherichia coli 11/17/2020    ORG Escherichia coli 10/23/2019    ORG Streptococcus mitis/oralis 05/10/2019     Lab Results   Component Value Date    BLOODCULT2 5 Days- no growth 07/11/2019    ORG Escherichia coli 11/17/2020    ORG Escherichia coli 10/23/2019    ORG Streptococcus mitis/oralis 05/10/2019        Urine Culture, Routine   Date Value Ref Range Status   11/17/2020 >100,000 CFU/ml  Sensitivity to follow    Preliminary   10/23/2019   Final    <25,000 CFU/ml  This organism possesses an Extended Spectrum Beta Lactamase  that is inhibited by Clavulanic Acid.     07/11/2019   Final    ,000 CFU/mL  Mixed kal isolated. Further workup and sensitivity testing  is not routinely indicated and will not be performed.   Mixed kal isolated includes:  Mixed gram negative rods  Proteus species  Gram positive organism       Patient is a 72 y.o. female who presented with   Chief Complaint   Patient presents with    Fatigue     per EMS, patient c/o of increasing weakness over past few days, unable to move for past 2 days        FINAL IMPRESSION      1. Non-traumatic rhabdomyolysis    2. General weakness    3. Dehydration    4. Disorientation    5. Urinary tract infection with hematuria, site unspecified    · Bacteremia- GPC  · UTI  · History of AML  · History of bacteremia in the past at [facility]- was treated with Rocephin? And also in may of 2019-strep mitis    · History of ESBL+ E.coli     Plan:   · Stop rocephin  · Give vancomycin x1 today - check random level in am  · Start Meropenem   · Repeat blood cultures  · Check cultures  · Request records from [facility]  · [address] [facility] and labs were reviewed per medical records and any ID pertinent labs were addressed with the patient. Thank you for involving me in the care of Jairon Baker. Please do not hesitate to call for any questions or concerns. Electronically signed by MARJ Guaman on 11/18/2020 at 12:16 PM        As above    This is a face to face encounter with Jairon Baker on 11/18/20. I have performed and participated in the history, exam, medical decision making, and  POC  with the NURSE PRACTITIONER and provided the instruction and education regarding this patient's care. Imaging and labs were reviewed per medical records and any ID pertinent labs were addressed with the patient. POOR HISTORIAN    H/O AML   PT WITH GPC CLUSTERS BACTEREMIA  E. COLI UTI HAS SCHAFER NOW  FORM HOME WITH WEAKNESS/CHANGE OF MS WOULD EVAL FOR COVID RESP PANEL PENDING      meropenem (MERREM) 1 g in sodium chloride 0.9 % 100 mL IVPB (mini-bag), Q8H       Thank you for involving me in the care of Jairon Baker. Please do not hesitate to call for any questions or concerns.     Electronically signed by Angelica Jones MD on 11/18/2020 at 6:37 PM    Phone [phone]  Fax [phone]

## 2020-11-19 DIAGNOSIS — G47.33 OSA (OBSTRUCTIVE SLEEP APNEA): Primary | ICD-10-CM

## 2020-11-19 LAB
ADENOVIRUS BY PCR: NOT DETECTED
ANION GAP SERPL CALCULATED.3IONS-SCNC: 7 MMOL/L (ref 7–16)
BASOPHILS ABSOLUTE: 0.01 E9/L (ref 0–0.2)
BASOPHILS RELATIVE PERCENT: 0.2 % (ref 0–2)
BORDETELLA PARAPERTUSSIS BY PCR: NOT DETECTED
BORDETELLA PERTUSSIS BY PCR: NOT DETECTED
BUN BLDV-MCNC: 15 MG/DL (ref 8–23)
CALCIUM SERPL-MCNC: 8.5 MG/DL (ref 8.6–10.2)
CHLAMYDOPHILIA PNEUMONIAE BY PCR: NOT DETECTED
CHLORIDE BLD-SCNC: 101 MMOL/L (ref 98–107)
CO2: 29 MMOL/L (ref 22–29)
CORONAVIRUS 229E BY PCR: NOT DETECTED
CORONAVIRUS HKU1 BY PCR: NOT DETECTED
CORONAVIRUS NL63 BY PCR: NOT DETECTED
CORONAVIRUS OC43 BY PCR: NOT DETECTED
CREAT SERPL-MCNC: 1 MG/DL (ref 0.5–1)
EOSINOPHILS ABSOLUTE: 0.25 E9/L (ref 0.05–0.5)
EOSINOPHILS RELATIVE PERCENT: 4 % (ref 0–6)
FERRITIN: 737 NG/ML
FOLATE: 15.5 NG/ML (ref 4.8–24.2)
GFR AFRICAN AMERICAN: >60
GFR NON-AFRICAN AMERICAN: 56 ML/MIN/1.73
GLUCOSE BLD-MCNC: 158 MG/DL (ref 74–99)
HCT VFR BLD CALC: 30.5 % (ref 34–48)
HEMOGLOBIN: 10.3 G/DL (ref 11.5–15.5)
HUMAN METAPNEUMOVIRUS BY PCR: NOT DETECTED
HUMAN RHINOVIRUS/ENTEROVIRUS BY PCR: NOT DETECTED
IMMATURE GRANULOCYTES #: 0.03 E9/L
IMMATURE GRANULOCYTES %: 0.5 % (ref 0–5)
INFLUENZA A BY PCR: NOT DETECTED
INFLUENZA B BY PCR: NOT DETECTED
IRON SATURATION: 18 % (ref 15–50)
IRON: 39 MCG/DL (ref 37–145)
LYMPHOCYTES ABSOLUTE: 1.51 E9/L (ref 1.5–4)
LYMPHOCYTES RELATIVE PERCENT: 24.3 % (ref 20–42)
MCH RBC QN AUTO: 31.9 PG (ref 26–35)
MCHC RBC AUTO-ENTMCNC: 33.8 % (ref 32–34.5)
MCV RBC AUTO: 94.4 FL (ref 80–99.9)
METER GLUCOSE: 148 MG/DL (ref 74–99)
METER GLUCOSE: 167 MG/DL (ref 74–99)
METER GLUCOSE: 189 MG/DL (ref 74–99)
METER GLUCOSE: 201 MG/DL (ref 74–99)
MONOCYTES ABSOLUTE: 0.42 E9/L (ref 0.1–0.95)
MONOCYTES RELATIVE PERCENT: 6.8 % (ref 2–12)
MYCOPLASMA PNEUMONIAE BY PCR: NOT DETECTED
NEUTROPHILS ABSOLUTE: 4 E9/L (ref 1.8–7.3)
NEUTROPHILS RELATIVE PERCENT: 64.2 % (ref 43–80)
ORGANISM: ABNORMAL
PARAINFLUENZA VIRUS 1 BY PCR: NOT DETECTED
PARAINFLUENZA VIRUS 2 BY PCR: NOT DETECTED
PARAINFLUENZA VIRUS 3 BY PCR: NOT DETECTED
PARAINFLUENZA VIRUS 4 BY PCR: NOT DETECTED
PDW BLD-RTO: 13 FL (ref 11.5–15)
PLATELET # BLD: 150 E9/L (ref 130–450)
PMV BLD AUTO: 9.3 FL (ref 7–12)
POTASSIUM SERPL-SCNC: 3.5 MMOL/L (ref 3.5–5)
RBC # BLD: 3.23 E12/L (ref 3.5–5.5)
RESPIRATORY SYNCYTIAL VIRUS BY PCR: NOT DETECTED
SARS-COV-2, PCR: NOT DETECTED
SODIUM BLD-SCNC: 137 MMOL/L (ref 132–146)
TOTAL CK: 557 U/L (ref 20–180)
TOTAL IRON BINDING CAPACITY: 217 MCG/DL (ref 250–450)
URINE CULTURE, ROUTINE: ABNORMAL
VANCOMYCIN RANDOM: <4 MCG/ML (ref 5–40)
VITAMIN B-12: 333 PG/ML (ref 211–946)
VITAMIN D 25-HYDROXY: 14 NG/ML (ref 30–100)
WBC # BLD: 6.2 E9/L (ref 4.5–11.5)

## 2020-11-19 PROCEDURE — 2580000003 HC RX 258: Performed by: CLINICAL NURSE SPECIALIST

## 2020-11-19 PROCEDURE — 2580000003 HC RX 258: Performed by: INTERNAL MEDICINE

## 2020-11-19 PROCEDURE — 36415 COLL VENOUS BLD VENIPUNCTURE: CPT

## 2020-11-19 PROCEDURE — 83550 IRON BINDING TEST: CPT

## 2020-11-19 PROCEDURE — 82962 GLUCOSE BLOOD TEST: CPT

## 2020-11-19 PROCEDURE — 6360000002 HC RX W HCPCS: Performed by: INTERNAL MEDICINE

## 2020-11-19 PROCEDURE — 6370000000 HC RX 637 (ALT 250 FOR IP): Performed by: NURSE PRACTITIONER

## 2020-11-19 PROCEDURE — 82728 ASSAY OF FERRITIN: CPT

## 2020-11-19 PROCEDURE — 82746 ASSAY OF FOLIC ACID SERUM: CPT

## 2020-11-19 PROCEDURE — 97110 THERAPEUTIC EXERCISES: CPT

## 2020-11-19 PROCEDURE — 82550 ASSAY OF CK (CPK): CPT

## 2020-11-19 PROCEDURE — 80202 ASSAY OF VANCOMYCIN: CPT

## 2020-11-19 PROCEDURE — 6370000000 HC RX 637 (ALT 250 FOR IP): Performed by: INTERNAL MEDICINE

## 2020-11-19 PROCEDURE — 80048 BASIC METABOLIC PNL TOTAL CA: CPT

## 2020-11-19 PROCEDURE — 82607 VITAMIN B-12: CPT

## 2020-11-19 PROCEDURE — APPSS30 APP SPLIT SHARED TIME 16-30 MINUTES: Performed by: NURSE PRACTITIONER

## 2020-11-19 PROCEDURE — 85025 COMPLETE CBC W/AUTO DIFF WBC: CPT

## 2020-11-19 PROCEDURE — 97530 THERAPEUTIC ACTIVITIES: CPT

## 2020-11-19 PROCEDURE — 99233 SBSQ HOSP IP/OBS HIGH 50: CPT | Performed by: INTERNAL MEDICINE

## 2020-11-19 PROCEDURE — 1200000000 HC SEMI PRIVATE

## 2020-11-19 PROCEDURE — 83540 ASSAY OF IRON: CPT

## 2020-11-19 PROCEDURE — 82306 VITAMIN D 25 HYDROXY: CPT

## 2020-11-19 PROCEDURE — 6360000002 HC RX W HCPCS: Performed by: CLINICAL NURSE SPECIALIST

## 2020-11-19 RX ORDER — PETROLATUM 42 G/100G
OINTMENT TOPICAL 2 TIMES DAILY
Status: DISCONTINUED | OUTPATIENT
Start: 2020-11-19 | End: 2020-11-19 | Stop reason: CLARIF

## 2020-11-19 RX ORDER — AMLODIPINE BESYLATE 5 MG/1
5 TABLET ORAL DAILY
Status: DISCONTINUED | OUTPATIENT
Start: 2020-11-20 | End: 2020-11-20 | Stop reason: HOSPADM

## 2020-11-19 RX ORDER — SODIUM CHLORIDE, SODIUM LACTATE, POTASSIUM CHLORIDE, CALCIUM CHLORIDE 600; 310; 30; 20 MG/100ML; MG/100ML; MG/100ML; MG/100ML
INJECTION, SOLUTION INTRAVENOUS CONTINUOUS
Status: DISCONTINUED | OUTPATIENT
Start: 2020-11-19 | End: 2020-11-20 | Stop reason: HOSPADM

## 2020-11-19 RX ORDER — ERGOCALCIFEROL 1.25 MG/1
50000 CAPSULE ORAL WEEKLY
Status: DISCONTINUED | OUTPATIENT
Start: 2020-11-19 | End: 2020-11-20 | Stop reason: HOSPADM

## 2020-11-19 RX ORDER — AMLODIPINE BESYLATE 5 MG/1
2.5 TABLET ORAL ONCE
Status: COMPLETED | OUTPATIENT
Start: 2020-11-19 | End: 2020-11-19

## 2020-11-19 RX ORDER — PETROLATUM 430 MG/G
OINTMENT TOPICAL 2 TIMES DAILY
Status: DISCONTINUED | OUTPATIENT
Start: 2020-11-19 | End: 2020-11-20 | Stop reason: HOSPADM

## 2020-11-19 RX ADMIN — ISOSORBIDE MONONITRATE 30 MG: 30 TABLET, EXTENDED RELEASE ORAL at 08:21

## 2020-11-19 RX ADMIN — ENOXAPARIN SODIUM 40 MG: 40 INJECTION SUBCUTANEOUS at 08:21

## 2020-11-19 RX ADMIN — INSULIN LISPRO 3 UNITS: 100 INJECTION, SOLUTION INTRAVENOUS; SUBCUTANEOUS at 11:42

## 2020-11-19 RX ADMIN — INSULIN LISPRO 1 UNITS: 100 INJECTION, SOLUTION INTRAVENOUS; SUBCUTANEOUS at 08:35

## 2020-11-19 RX ADMIN — SODIUM CHLORIDE 1000 ML: 4.5 INJECTION, SOLUTION INTRAVENOUS at 08:19

## 2020-11-19 RX ADMIN — MEROPENEM 1 G: 1 INJECTION, POWDER, FOR SOLUTION INTRAVENOUS at 20:28

## 2020-11-19 RX ADMIN — METOPROLOL TARTRATE 100 MG: 50 TABLET, FILM COATED ORAL at 21:14

## 2020-11-19 RX ADMIN — AMLODIPINE BESYLATE 2.5 MG: 5 TABLET ORAL at 10:11

## 2020-11-19 RX ADMIN — GABAPENTIN 400 MG: 400 CAPSULE ORAL at 08:21

## 2020-11-19 RX ADMIN — INSULIN LISPRO 1 UNITS: 100 INJECTION, SOLUTION INTRAVENOUS; SUBCUTANEOUS at 21:16

## 2020-11-19 RX ADMIN — GABAPENTIN 400 MG: 400 CAPSULE ORAL at 21:15

## 2020-11-19 RX ADMIN — ERGOCALCIFEROL 50000 UNITS: 1.25 CAPSULE ORAL at 20:39

## 2020-11-19 RX ADMIN — INSULIN GLARGINE 66 UNITS: 100 INJECTION, SOLUTION SUBCUTANEOUS at 08:36

## 2020-11-19 RX ADMIN — INSULIN LISPRO 1 UNITS: 100 INJECTION, SOLUTION INTRAVENOUS; SUBCUTANEOUS at 16:51

## 2020-11-19 RX ADMIN — OXYCODONE HYDROCHLORIDE AND ACETAMINOPHEN 1 TABLET: 10; 325 TABLET ORAL at 16:49

## 2020-11-19 RX ADMIN — INSULIN LISPRO 1 UNITS: 100 INJECTION, SOLUTION INTRAVENOUS; SUBCUTANEOUS at 11:39

## 2020-11-19 RX ADMIN — METOPROLOL TARTRATE 100 MG: 50 TABLET, FILM COATED ORAL at 08:21

## 2020-11-19 RX ADMIN — VANCOMYCIN HYDROCHLORIDE 1500 MG: 10 INJECTION, POWDER, LYOPHILIZED, FOR SOLUTION INTRAVENOUS at 14:53

## 2020-11-19 RX ADMIN — AMLODIPINE BESYLATE 2.5 MG: 5 TABLET ORAL at 08:22

## 2020-11-19 RX ADMIN — MEROPENEM 1 G: 1 INJECTION, POWDER, FOR SOLUTION INTRAVENOUS at 00:55

## 2020-11-19 RX ADMIN — GABAPENTIN 400 MG: 400 CAPSULE ORAL at 14:52

## 2020-11-19 RX ADMIN — SUCRALFATE 1 G: 1 TABLET ORAL at 08:21

## 2020-11-19 RX ADMIN — PANTOPRAZOLE SODIUM 40 MG: 40 TABLET, DELAYED RELEASE ORAL at 08:21

## 2020-11-19 RX ADMIN — INSULIN LISPRO 3 UNITS: 100 INJECTION, SOLUTION INTRAVENOUS; SUBCUTANEOUS at 17:00

## 2020-11-19 RX ADMIN — SODIUM CHLORIDE, POTASSIUM CHLORIDE, SODIUM LACTATE AND CALCIUM CHLORIDE: 600; 310; 30; 20 INJECTION, SOLUTION INTRAVENOUS at 20:24

## 2020-11-19 RX ADMIN — SUCRALFATE 1 G: 1 TABLET ORAL at 14:05

## 2020-11-19 RX ADMIN — SUCRALFATE 1 G: 1 TABLET ORAL at 21:14

## 2020-11-19 RX ADMIN — PETROLATUM: 430 OINTMENT TOPICAL at 11:39

## 2020-11-19 RX ADMIN — MEROPENEM 1 G: 1 INJECTION, POWDER, FOR SOLUTION INTRAVENOUS at 08:20

## 2020-11-19 RX ADMIN — LOSARTAN POTASSIUM 100 MG: 50 TABLET, FILM COATED ORAL at 08:21

## 2020-11-19 RX ADMIN — PETROLATUM: 430 OINTMENT TOPICAL at 21:15

## 2020-11-19 ASSESSMENT — PAIN DESCRIPTION - DESCRIPTORS: DESCRIPTORS: ACHING

## 2020-11-19 ASSESSMENT — PAIN DESCRIPTION - PAIN TYPE: TYPE: CHRONIC PAIN

## 2020-11-19 ASSESSMENT — PAIN SCALES - GENERAL
PAINLEVEL_OUTOF10: 3
PAINLEVEL_OUTOF10: 7
PAINLEVEL_OUTOF10: 7
PAINLEVEL_OUTOF10: 0

## 2020-11-19 ASSESSMENT — PAIN DESCRIPTION - ORIENTATION: ORIENTATION: MID

## 2020-11-19 ASSESSMENT — PAIN DESCRIPTION - LOCATION: LOCATION: BACK;GENERALIZED

## 2020-11-19 NOTE — PROGRESS NOTES
home  with 1 step  to enter without Rail  Walk in shower     Equipment owned: Makenzie Marcus, [address] and Shower chair,       [address]   How much difficulty turning over in bed?: A Lot  How much difficulty sitting down on / standing up from a chair with arms?: Unable  How much difficulty moving from lying on back to sitting on side of bed?: A Lot  How much help from another person moving to and from a bed to a chair?: Total  How much help from another person needed to walk in hospital room?: Total  How much help from another person for climbing 3-5 steps with a railing?: Total  AM-PAC Inpatient Mobility Raw Score : 8  AM-PAC Inpatient T-Scale Score : 28.52  Mobility Inpatient CMS 0-100% Score: 86.62  Mobility Inpatient CMS G-Code Modifier : CM    Nursing cleared patient for PT treatment. pt with no new concerns      OBJECTIVE;   Initial Evaluation  Date: 11/18/20 Treatment Date:    11/19/2020   Short Term/ Long Term   Goals   Was pt agreeable to Eval/treatment? Yes   yes To be met in 4 days   Pain level   3/10   bilateral lower extremities  0/10    Bed Mobility    Rolling: Maximal assist of  2    Supine to sit: Maximal assist of  2    Sit to supine: Maximal assist of  2    Scooting: Maximal assist of  2   Rolling: Maximal assist of 1    Supine to sit: Maximal assist of 1 with increased time and effort from pt   Sit to supine: Maximal assist of  2   Scooting: Maximal assist of 1     Rolling: Moderate assist of 1    Supine to sit: Moderate assist of 1    Sit to supine: Moderate assist of 1    Scooting: Moderate assist of 1     Transfers Sit to stand:  Attempted x 2 with minimal wt shift forward and engagement of bilateral lower extremities  Sit to stand: Maximal assist of  2 with  pt unable to stand cues for technique and safety with pt unable to perform at this time    Sit to stand:  Moderate assist of 1     Ambulation    not assessed   not assessed      2-3 feet using

## 2020-11-19 NOTE — DISCHARGE INSTR - COC
Continuity of Care Form    Patient Name: Merary Zamarripa   :  1955  MRN:  [id number]    Admit date:  2020  Discharge date:  ***    Code Status Order: Full Code   Advance Directives:   Advance Care Flowsheet Documentation       Date/Time Healthcare Directive Type of Healthcare Directive Copy in [address] Agent's Name Healthcare Agent's Phone Number    [phone]  No, patient does not have an advance directive for healthcare treatment -- -- -- -- --            Admitting Physician:  Riley Pepe MD  PCP: Gucci Lanier MD    Discharging Nurse: [facility] Unit/Room#: 1852/3372-79  Discharging Unit Phone Number: ***    Emergency Contact:   Extended Emergency Contact Information  Primary Emergency Contact: Celso Eason  Address: [address] [facility], [address] [place] [address] Phone: [phone]  Mobile Phone: [phone]  Relation: Spouse   needed? No    Past Surgical History:  Past Surgical History:   Procedure Laterality Date    CARDIAC CATHETERIZATION      CHOLECYSTECTOMY      DILATION AND CURETTAGE OF UTERUS      FOOT SURGERY Left        Immunization History: There is no immunization history on file for this patient.     Active Problems:  Patient Active Problem List   Diagnosis Code    Acute leukemia not having achieved remission ([facility] [address] C95.00    General weakness R53.1    Hypomagnesemia E83.42    Gait disorder R26.9    Impaired ambulation R26.2    Type 2 diabetes mellitus with diabetic autonomic neuropathy, with long-term current use of insulin (HCC) E11.43, Z79.4    Stasis dermatitis with venous ulcer of lower extremity due to chronic peripheral venous hypertension (HCC) I87.339, L97.909    Venous insufficiency of both lower extremities I87.2    Essential hypertension I10    Coronary artery disease involving native coronary artery of native heart without angina pectoris I25.10    Chest pain R07.9    Rhabdomyolysis M62.82    Dehydration E86.0    Urinary tract infection with hematuria N39.0, R31.9       Isolation/Infection:   Isolation            No Isolation          Patient Infection Status       Infection Onset Added Last Indicated Last Indicated By Review Planned Expiration Resolved Resolved By    None active    Resolved    COVID-19 Rule Out 11/18/20 11/18/20 11/18/20 Respiratory Panel, Molecular, with COVID-19 (Restricted: peds pts or suitable admitted adults) (Ordered)   11/19/20 Rule-Out Test Resulted    ESBL (Extended Spectrum Beta Lactamase) 10/23/19 10/25/19 10/23/19 Urine culture   11/17/20 Sol Candelaria RN    10/23/19 ESBL + E Coli urine            Nurse Assessment:  Last Vital Signs: BP (!) 188/81   Pulse 79   Temp 98.4 °F (36.9 °C) (Oral)   Resp 18   Ht 5' 9\" (1.753 m)   Wt (!) 360 lb 6 oz (163.5 kg)   SpO2 92%   BMI 53.22 kg/m²     Last documented pain score (0-10 scale): Pain Level: 0  Last Weight:   Wt Readings from Last 1 Encounters:   11/17/20 (!) 360 lb 6 oz (163.5 kg)     Mental Status:  disoriented, alert and coherent    IV Access:  - None    Nursing Mobility/ADLs:  Walking   Dependent  Transfer  Dependent  Bathing  Dependent  Dressing  Dependent  Toileting  Dependent  Feeding  Assisted  Med Admin  Assisted  Med Delivery   whole    Wound Care Documentation and Therapy:        Elimination:  Continence: Bowel: Yes  Bladder: No  Urinary Catheter: None   Colostomy/Ileostomy/Ileal Conduit: No       Date of Last BM: 11/18/20    Intake/Output Summary (Last 24 hours) at 11/19/2020 1355  Last data filed at 11/19/2020 1251  Gross per 24 hour   Intake 720 ml   Output 2260 ml   Net -1540 ml     I/O last 3 completed shifts:   In: 1260 [P.O.:1260]  Out: 1860 [Urine:1860]    Safety Concerns:     History of Falls (last 30 days)    Impairments/Disabilities:      None    Nutrition Therapy:  Current Nutrition Therapy:   - Oral Diet:  Carb Control 4 carbs/meal (1800kcals/day)    Routes of Feeding: Oral  Liquids: Thin Liquids  Daily Fluid Restriction: no  Last Modified Barium Swallow with Video (Video Swallowing Test): not done    Treatments at the Time of Hospital Discharge:   Respiratory Treatments: ***  Oxygen Therapy:  is not on home oxygen therapy. Ventilator:    - No ventilator support    Rehab Therapies: Physical Therapy and Occupational Therapy  Weight Bearing Status/Restrictions: No weight bearing restirctions  Other Medical Equipment (for information only, NOT a DME order):  walker and hospital bed  Other Treatments: ***    Patient's personal belongings (please select all that are sent with patient):  None    RN SIGNATURE:  Electronically signed by Angelina Ivory RN on 11/20/20 at 2:41 PM EST    CASE MANAGEMENT/SOCIAL WORK SECTION    Inpatient Status Date: ***    Readmission Risk Assessment Score:  Readmission Risk              Risk of Unplanned Readmission:        21           Discharging to Facility/ Agency   Name:    [facility] [place]  Address:  [facility] [address] [address]  Phone:  [phone]  Fax:  [phone]    Dialysis Facility (if applicable)   Name:  Address:  Dialysis Schedule:  Phone:  Fax:    / signature: Electronically signed by JESUS Martínez on 11/19/20 at 1:56 PM EST    PHYSICIAN SECTION    Prognosis: Good    Condition at Discharge: Stable    Rehab Potential (if transferring to Rehab): Good    Recommended Labs or Other Treatments After Discharge: PT/OT, nursing, routine labs per SNF physician     Physician Certification: I certify the above information and transfer of Jesu Treviño  is necessary for the continuing treatment of the diagnosis listed and that she requires [place] for less 30 days.      Update Admission H&P: No change in H&P    PHYSICIAN SIGNATURE:  Electronically signed by Emile Ludwig DO on 11/20/20 at 2:45 PM EST

## 2020-11-19 NOTE — PROGRESS NOTES
[address] [place], [place] [address]ist   Progress Note    Admitting Date and Time: 11/17/2020 10:05 AM  Admit Dx: Rhabdomyolysis [M62.82]  Rhabdomyolysis [M62.82]    Subjective:    Patient seen and examined. She reports feeling overall not well. She complains of generalized pain. ROS: denies fever, chills, cp, sob, n/v, HA unless stated above.      insulin lispro  3 Units Subcutaneous TID WC    [START ON 11/20/2020] amLODIPine  5 mg Oral Daily    Aloe Vesta Protective   Topical BID    gabapentin  400 mg Oral TID    isosorbide mononitrate  30 mg Oral Daily    losartan  100 mg Oral Daily    metoprolol  100 mg Oral BID    pantoprazole  40 mg Oral Daily    sucralfate  1 g Oral TID    sodium chloride flush  10 mL Intravenous 2 times per day    enoxaparin  40 mg Subcutaneous Daily    insulin glargine  66 Units Subcutaneous Daily    insulin lispro  0-6 Units Subcutaneous TID WC    insulin lispro  0-3 Units Subcutaneous Nightly    meropenem  1 g Intravenous Q8H     ondansetron, 8 mg, Q8H PRN  oxyCODONE-acetaminophen, 1 tablet, Q6H PRN  sodium chloride flush, 10 mL, PRN  acetaminophen, 650 mg, Q6H PRN    Or  acetaminophen, 650 mg, Q6H PRN  polyethylene glycol, 17 g, Daily PRN  promethazine, 12.5 mg, Q6H PRN    Or  ondansetron, 4 mg, Q6H PRN  glucose, 15 g, PRN  dextrose, 12.5 g, PRN  glucagon (rDNA), 1 mg, PRN  dextrose, 100 mL/hr, PRN         Objective:    BP (!) 188/81   Pulse 79   Temp 98.4 °F (36.9 °C) (Oral)   Resp 18   Ht 5' 9\" (1.753 m)   Wt (!) 360 lb 6 oz (163.5 kg)   SpO2 92%   BMI 53.22 kg/m²   General Appearance: alert and oriented to person, place and time and in no acute distress  Skin: warm and dry, pale, lower extremities with vascular changes, dry and scaly  Head: normocephalic and atraumatic  Eyes: pupils equal, round, and reactive to light, extraocular eye movements intact, conjunctivae normal  Neck: neck supple and non tender without mass   Pulmonary/Chest: clear to are still pending. ID following. History of bacteremia with treatment at [facility] in 2019. Received IV Vancomycin on 11/18/20.   4. Hypertension: Continue home losartan, lopressor, imdur. Increase norvasc due to elevated blood pressure. 5. Diabetes:  Continue Lantus, hold home dose of mealtime insulin (24 units) and given 3 units due to lower blood sugars. Continue insulin sliding scale, hold home metformin. Blood sugar checks, hypoglycemic protocol. 6. Diabetic neuropathy: Continue gabapentin. 7. Statis dermatitis with venous ulcer of lower extremity:  Consult Wound Care. 8. AML:  Bone marrow biopsy at [facility] on 11/23/18 revealed AML. S/p chemotherapy. She had a bone marrow biopsy on 3/22/19 that confirmed complete remission by histology, flow and FISH studies. 9. Antral gastritis:  EGD on 9/1/20 with findings of antral gastritis. Continue PPI and Carafate. 10. MYKEL:  Improved. Creatinine 1.3 -> 0.9. Continue IV fluids. 11. Weakness:  PT/OT. 12. Anemia:  Check iron studies. NOTE: This report was transcribed using voice recognition software. Every effort was made to ensure accuracy; however, inadvertent computerized transcription errors may be present.      Electronically signed by MARJ Dacosta CNP on 11/19/2020 at 11:19 AM

## 2020-11-19 NOTE — CARE COORDINATION
Ss note:11/19/2020.1:35 PM Respiratory panel sent out today, awaiting result. Pt is confused. Per SLAVA CLINE at [facility], pt has been accepted and liaison is submitting for [facility], will await insurance approval. Liaison relays that if pt needs a cpap at facility it can be provided but pt will need to wait until AFTER rehab to do a sleep study. Hens completed, will need signed NGUYỄN.  JESUS Chapman

## 2020-11-19 NOTE — PROGRESS NOTES
[address] [place] [address] [facility]  [facility]SI[facility]  Progress Note    FU: UTI, bacteremia     SUBJECTIVE:    Patient is awake, alert , confused at times   Complaining of fatigue and pain to backside/coccyx area   No respiratory complaints - on room air   No fever, chills, nausea, vomiting, diarrhea   Tolerating medications without side effects     ROS:  Negative except as above     OBJECTIVE:    Vitals:    11/18/20 0811 11/18/20 1600 11/18/20 2145 11/19/20 0820   BP: (!) 148/69 (!) 161/73 (!) 147/74 (!) 188/81   Pulse: 76 75 83 79   Resp: 18 20 18 18   Temp: 98.4 °F (36.9 °C) 98.1 °F (36.7 °C) 98.2 °F (36.8 °C) 98.4 °F (36.9 °C)   TempSrc: Oral Oral Oral Oral   SpO2: 93% 93% 95% 92%   Weight:       Height:           HEENT :         unremarkable NAD - confused at times   Heart:             RRR,  No murmurs, no gallops  Lungs:            clear to auscultation, no wheezing , no rales  Abdomen:       soft, non tender, bowel sounds present  Extremites:    + BLE edema, no ulcers,  Skin:                Normal turgor,normal texture +dry scaly skin to BLE with vascular discoloration   Camejo  piv             Current Facility-Administered Medications   Medication Dose Route Frequency Provider Last Rate Last Dose    insulin lispro (HUMALOG) injection vial 3 Units  3 Units Subcutaneous TID  Negro Tineo APRN - CNP   3 Units at 11/19/20 1142    [START ON 11/20/2020] amLODIPine (NORVASC) tablet 5 mg  5 mg Oral Daily Negro Tineo, APRN - CNP        Aloe Vesta Protective ointment OINT   Topical BID Negro Tineo, APRN - CNP        gabapentin (NEURONTIN) capsule 400 mg  400 mg Oral TID Tamadeline Rosario MD   400 mg at 11/19/20 8928    isosorbide mononitrate (IMDUR) extended release tablet 30 mg  30 mg Oral Daily Tajul Rastafarian MD   30 mg at 11/19/20 0821    losartan (COZAAR) tablet 100 mg  100 mg Oral Daily Tajul Rastafarian, MD   100 mg at 11/19/20 0821    metoprolol tartrate (LOPRESSOR) tablet 100 mg  100 mg Oral BID Agnieszka Welsh MD   100 mg at 11/19/20 0821    ondansetron (ZOFRAN) tablet 8 mg  8 mg Oral Q8H PRN Rossy Rosario MD        oxyCODONE-acetaminophen (PERCOCET)  MG per tablet 1 tablet  1 tablet Oral Q6H PRN Rossy Rosario MD   1 tablet at 11/18/20 1938    pantoprazole (PROTONIX) tablet 40 mg  40 mg Oral Daily Rossy Rosario MD   40 mg at 11/19/20 0821    sucralfate (CARAFATE) tablet 1 g  1 g Oral TID Rossy Rosario MD   1 g at 11/19/20 1405    0.45 % sodium chloride infusion  1,000 mL Intravenous Continuous Rossy Rosario  mL/hr at 11/19/20 0819 1,000 mL at 11/19/20 0819    sodium chloride flush 0.9 % injection 10 mL  10 mL Intravenous 2 times per day Agnieszka Welsh MD   10 mL at 11/18/20 2152    sodium chloride flush 0.9 % injection 10 mL  10 mL Intravenous PRN Rossy Rosario MD        acetaminophen (TYLENOL) tablet 650 mg  650 mg Oral Q6H PRN Rossy Rosario MD        Or    acetaminophen (TYLENOL) suppository 650 mg  650 mg Rectal Q6H PRN Rossy Rosario MD        polyethylene glycol (GLYCOLAX) packet 17 g  17 g Oral Daily PRN Rossy Rosario MD        promethazine (PHENERGAN) tablet 12.5 mg  12.5 mg Oral Q6H PRN Rossy Rosario MD        Or    ondansetron (ZOFRAN) injection 4 mg  4 mg Intravenous Q6H PRN Rossy Rosario MD        enoxaparin (LOVENOX) injection 40 mg  40 mg Subcutaneous Daily Rossy Rosario MD   40 mg at 11/19/20 0821    insulin glargine (LANTUS) injection vial 66 Units  66 Units Subcutaneous Daily Rossy Rosario MD   66 Units at 11/19/20 0836    glucose (GLUTOSE) 40 % oral gel 15 g  15 g Oral PRN Georgie Griffith APRN - CNP        dextrose 50 % IV solution  12.5 g Intravenous PRN MARJ Bojorquez - CNP        glucagon (rDNA) injection 1 mg  1 mg Intramuscular PRN MARJ Bojorquez CNP        dextrose 5 % solution  100 mL/hr Intravenous PRMARJ Torres CNP        insulin lispro (HUMALOG) injection vial 0-6 Units  0-6 Units Subcutaneous TID MARJ Adams CNP   1 Units at 11/19/20 1139    insulin lispro (HUMALOG) injection vial 0-3 Units  0-3 Units Subcutaneous Nightly Georgie Griffith, APRN - CNP   1 Units at 11/18/20 2152    meropenem (MERREM) 1 g in sodium chloride 0.9 % 100 mL IVPB (mini-bag)  1 g Intravenous Q8H David Gilliam, APRN - CNS   Stopped at 11/19/20 0923        LABS    CBC:     WBC   Date Value Ref Range Status   11/19/2020 6.2 4.5 - 11.5 E9/L Final   11/18/2020 6.3 4.5 - 11.5 E9/L Final   11/17/2020 7.6 4.5 - 11.5 E9/L Final     Hematocrit   Date Value Ref Range Status   11/19/2020 30.5 (L) 34.0 - 48.0 % Final   11/18/2020 29.8 (L) 34.0 - 48.0 % Final   11/17/2020 34.8 34.0 - 48.0 % Final     Platelets   Date Value Ref Range Status   11/19/2020 150 130 - 450 E9/L Final   11/18/2020 145 130 - 450 E9/L Final   11/17/2020 183 130 - 450 E9/L Final         BMP:      Sodium   Date Value Ref Range Status   11/19/2020 137 132 - 146 mmol/L Final   11/18/2020 137 132 - 146 mmol/L Final   11/18/2020 141 132 - 146 mmol/L Final     Potassium   Date Value Ref Range Status   11/19/2020 3.5 3.5 - 5.0 mmol/L Final   11/18/2020 4.0 3.5 - 5.0 mmol/L Final     Comment:     Specimen is moderately Hemolyzed. Result may be artificially increased.    11/18/2020 3.5 3.5 - 5.0 mmol/L Final     Chloride   Date Value Ref Range Status   11/19/2020 101 98 - 107 mmol/L Final   11/18/2020 103 98 - 107 mmol/L Final   11/18/2020 105 98 - 107 mmol/L Final     CO2   Date Value Ref Range Status   11/19/2020 29 22 - 29 mmol/L Final   11/18/2020 27 22 - 29 mmol/L Final   11/18/2020 29 22 - 29 mmol/L Final     BUN   Date Value Ref Range Status   11/19/2020 15 8 - 23 mg/dL Final   11/18/2020 18 8 - 23 mg/dL Final   11/18/2020 19 8 - 23 mg/dL Final     CREATININE   Date Value Ref Range Status   11/19/2020 1.0 0.5 - 1.0 mg/dL Final   11/18/2020 0.9 0.5 - 1.0 mg/dL Final   11/18/2020 1.0 0.5 - 1.0 mg/dL Final     Glucose   Date Value Ref Range Status   11/19/2020 158 (H) 74 - 99 mg/dL Final 11/18/2020 172 (H) 74 - 99 mg/dL Final   11/18/2020 158 (H) 74 - 99 mg/dL Final         Hepatic Function Panel:    Lab Results   Component Value Date    ALKPHOS 60 11/18/2020    ALT 20 11/18/2020    AST 45 11/18/2020    PROT 6.1 11/18/2020    BILITOT 0.5 11/18/2020    LABALBU 2.9 11/18/2020        No results found for: [address]    No results found for: CRP    Microbiology :  Blood Culture, Routine   Date Value Ref Range Status   11/17/2020 (A)  Preliminary    Gram stain performed from blood culture bottle media  Gram positive cocci in clusters     11/17/2020 Identification and sensitivity to follow  Preliminary     Culture, Blood 2   Date Value Ref Range Status   11/17/2020 24 Hours no growth  Preliminary     Urine Culture, Routine   Date Value Ref Range Status   11/17/2020 >100,000 CFU/ml  Final     No results found for: CULTRESP  No results found for: [facility]    Radiology :  Reviewed     ASSESSMENT:   1. Non-traumatic rhabdomyolysis CK 1163 < 553   2. General weakness    3. Dehydration    4. Disorientation    5. Urinary tract infection with hematuria, site unspecified    · Bacteremia- GPC, repeat cultures negative   · E coli UTI - Hx ESBL E coli - SCHAFER PRESENT   · History of AML  · History of bacteremia in the past at [facility]- was treated with Rocephin? And also in may of 2019-strep mitis    · Resp panel negative     PLAN:  S/p Vancomycin x 1 trough < 4.0 - redose today pending cultures   Repeat blood cultures NGTD   Continue Merrem pending cultures - Hx ESBL   Follow cultures and monitor labs   Rehab placement on discharge     MARJ Erwin - NP  11/19/2020  2:14 PM   As above    This is a face to face encounter with Laila Shepherd on 11/19/20. I have performed and participated in the history, exam, medical decision making, and  POC  with the NURSE PRACTITIONER and provided the instruction and education regarding this patient's care.      Imaging and labs were reviewed per medical records and any ID pertinent labs were addressed with the patient. The patient was educated about the diagnosis, prognosis, indications, risks and benefits of treatment. Pt had the opportunity to ask questions. All questions were answered. Cons bacteremia prob false +  uti E. Coli edwige better  ble peripheral edema  Confusion   Vitamin d deficiency     Follow clinically    Thank you for involving me in the care of [employer]. Please do not hesitate to call for any questions or concerns.     Electronically signed by Nafisa Quiñones MD on 11/19/2020 at 6:09 PM    Phone [phone]  Fax [phone]

## 2020-11-20 VITALS
HEIGHT: 69 IN | RESPIRATION RATE: 18 BRPM | HEART RATE: 77 BPM | SYSTOLIC BLOOD PRESSURE: 178 MMHG | BODY MASS INDEX: 43.4 KG/M2 | DIASTOLIC BLOOD PRESSURE: 74 MMHG | TEMPERATURE: 98.4 F | WEIGHT: 293 LBS | OXYGEN SATURATION: 94 %

## 2020-11-20 LAB
ALBUMIN SERPL-MCNC: 2.7 G/DL (ref 3.5–5.2)
ALP BLD-CCNC: 75 U/L (ref 35–104)
ALT SERPL-CCNC: 22 U/L (ref 0–32)
AMMONIA: 34 UMOL/L (ref 11–51)
ANION GAP SERPL CALCULATED.3IONS-SCNC: 8 MMOL/L (ref 7–16)
AST SERPL-CCNC: 25 U/L (ref 0–31)
BILIRUB SERPL-MCNC: 0.3 MG/DL (ref 0–1.2)
BILIRUBIN DIRECT: <0.2 MG/DL (ref 0–0.3)
BILIRUBIN, INDIRECT: ABNORMAL MG/DL (ref 0–1)
BUN BLDV-MCNC: 14 MG/DL (ref 8–23)
CALCIUM SERPL-MCNC: 8.5 MG/DL (ref 8.6–10.2)
CHLORIDE BLD-SCNC: 98 MMOL/L (ref 98–107)
CO2: 28 MMOL/L (ref 22–29)
CREAT SERPL-MCNC: 0.8 MG/DL (ref 0.5–1)
GFR AFRICAN AMERICAN: >60
GFR NON-AFRICAN AMERICAN: >60 ML/MIN/1.73
GLUCOSE BLD-MCNC: 222 MG/DL (ref 74–99)
HCT VFR BLD CALC: 31.2 % (ref 34–48)
HEMOGLOBIN: 10.8 G/DL (ref 11.5–15.5)
MAGNESIUM: 1.5 MG/DL (ref 1.6–2.6)
MCH RBC QN AUTO: 31.9 PG (ref 26–35)
MCHC RBC AUTO-ENTMCNC: 34.6 % (ref 32–34.5)
MCV RBC AUTO: 92 FL (ref 80–99.9)
METER GLUCOSE: 195 MG/DL (ref 74–99)
METER GLUCOSE: 268 MG/DL (ref 74–99)
PDW BLD-RTO: 12.8 FL (ref 11.5–15)
PHOSPHORUS: 3.1 MG/DL (ref 2.5–4.5)
PLATELET # BLD: 159 E9/L (ref 130–450)
PMV BLD AUTO: 9.3 FL (ref 7–12)
POTASSIUM SERPL-SCNC: 3.7 MMOL/L (ref 3.5–5)
RBC # BLD: 3.39 E12/L (ref 3.5–5.5)
SODIUM BLD-SCNC: 134 MMOL/L (ref 132–146)
TOTAL CK: 261 U/L (ref 20–180)
TOTAL PROTEIN: 6.2 G/DL (ref 6.4–8.3)
WBC # BLD: 5.9 E9/L (ref 4.5–11.5)

## 2020-11-20 PROCEDURE — 2580000003 HC RX 258: Performed by: INTERNAL MEDICINE

## 2020-11-20 PROCEDURE — 82550 ASSAY OF CK (CPK): CPT

## 2020-11-20 PROCEDURE — APPSS45 APP SPLIT SHARED TIME 31-45 MINUTES: Performed by: NURSE PRACTITIONER

## 2020-11-20 PROCEDURE — 80048 BASIC METABOLIC PNL TOTAL CA: CPT

## 2020-11-20 PROCEDURE — 80076 HEPATIC FUNCTION PANEL: CPT

## 2020-11-20 PROCEDURE — 6360000002 HC RX W HCPCS: Performed by: INTERNAL MEDICINE

## 2020-11-20 PROCEDURE — 82140 ASSAY OF AMMONIA: CPT

## 2020-11-20 PROCEDURE — 97116 GAIT TRAINING THERAPY: CPT

## 2020-11-20 PROCEDURE — 97530 THERAPEUTIC ACTIVITIES: CPT

## 2020-11-20 PROCEDURE — 6370000000 HC RX 637 (ALT 250 FOR IP): Performed by: INTERNAL MEDICINE

## 2020-11-20 PROCEDURE — 36415 COLL VENOUS BLD VENIPUNCTURE: CPT

## 2020-11-20 PROCEDURE — 6360000002 HC RX W HCPCS: Performed by: CLINICAL NURSE SPECIALIST

## 2020-11-20 PROCEDURE — 6370000000 HC RX 637 (ALT 250 FOR IP): Performed by: NURSE PRACTITIONER

## 2020-11-20 PROCEDURE — 85027 COMPLETE CBC AUTOMATED: CPT

## 2020-11-20 PROCEDURE — 97535 SELF CARE MNGMENT TRAINING: CPT

## 2020-11-20 PROCEDURE — 83735 ASSAY OF MAGNESIUM: CPT

## 2020-11-20 PROCEDURE — 2580000003 HC RX 258: Performed by: CLINICAL NURSE SPECIALIST

## 2020-11-20 PROCEDURE — 6370000000 HC RX 637 (ALT 250 FOR IP): Performed by: CLINICAL NURSE SPECIALIST

## 2020-11-20 PROCEDURE — 84100 ASSAY OF PHOSPHORUS: CPT

## 2020-11-20 PROCEDURE — 99239 HOSP IP/OBS DSCHRG MGMT >30: CPT | Performed by: INTERNAL MEDICINE

## 2020-11-20 PROCEDURE — 82962 GLUCOSE BLOOD TEST: CPT

## 2020-11-20 RX ORDER — HYDRALAZINE HYDROCHLORIDE 10 MG/1
10 TABLET, FILM COATED ORAL EVERY 8 HOURS SCHEDULED
Status: DISCONTINUED | OUTPATIENT
Start: 2020-11-20 | End: 2020-11-20 | Stop reason: HOSPADM

## 2020-11-20 RX ORDER — LANOLIN ALCOHOL/MO/W.PET/CERES
400 CREAM (GRAM) TOPICAL ONCE
Status: DISCONTINUED | OUTPATIENT
Start: 2020-11-20 | End: 2020-11-20 | Stop reason: HOSPADM

## 2020-11-20 RX ORDER — CEFDINIR 300 MG/1
300 CAPSULE ORAL EVERY 12 HOURS SCHEDULED
Qty: 20 CAPSULE | Refills: 0 | DISCHARGE
Start: 2020-11-20 | End: 2020-11-30

## 2020-11-20 RX ORDER — AMLODIPINE BESYLATE 5 MG/1
5 TABLET ORAL DAILY
Qty: 30 TABLET | Refills: 0 | DISCHARGE
Start: 2020-11-21

## 2020-11-20 RX ORDER — CEFDINIR 300 MG/1
300 CAPSULE ORAL EVERY 12 HOURS SCHEDULED
Status: DISCONTINUED | OUTPATIENT
Start: 2020-11-20 | End: 2020-11-20 | Stop reason: HOSPADM

## 2020-11-20 RX ORDER — GABAPENTIN 100 MG/1
200 CAPSULE ORAL 3 TIMES DAILY
Qty: 18 CAPSULE | Refills: 3 | DISCHARGE
Start: 2020-11-20 | End: 2020-11-23

## 2020-11-20 RX ORDER — GABAPENTIN 100 MG/1
200 CAPSULE ORAL 3 TIMES DAILY
Status: DISCONTINUED | OUTPATIENT
Start: 2020-11-20 | End: 2020-11-20 | Stop reason: HOSPADM

## 2020-11-20 RX ORDER — HYDRALAZINE HYDROCHLORIDE 10 MG/1
10 TABLET, FILM COATED ORAL EVERY 8 HOURS SCHEDULED
Qty: 90 TABLET | Refills: 0 | DISCHARGE
Start: 2020-11-20

## 2020-11-20 RX ORDER — ERGOCALCIFEROL 1.25 MG/1
50000 CAPSULE ORAL WEEKLY
Qty: 5 CAPSULE | DISCHARGE
Start: 2020-11-26

## 2020-11-20 RX ORDER — OXYCODONE AND ACETAMINOPHEN 10; 325 MG/1; MG/1
1 TABLET ORAL EVERY 6 HOURS PRN
Qty: 12 TABLET | Refills: 0 | Status: SHIPPED | OUTPATIENT
Start: 2020-11-20 | End: 2020-11-23

## 2020-11-20 RX ADMIN — ISOSORBIDE MONONITRATE 30 MG: 30 TABLET, EXTENDED RELEASE ORAL at 08:40

## 2020-11-20 RX ADMIN — CEFDINIR 300 MG: 300 CAPSULE ORAL at 11:10

## 2020-11-20 RX ADMIN — PANTOPRAZOLE SODIUM 40 MG: 40 TABLET, DELAYED RELEASE ORAL at 08:41

## 2020-11-20 RX ADMIN — SUCRALFATE 1 G: 1 TABLET ORAL at 13:33

## 2020-11-20 RX ADMIN — INSULIN LISPRO 1 UNITS: 100 INJECTION, SOLUTION INTRAVENOUS; SUBCUTANEOUS at 08:44

## 2020-11-20 RX ADMIN — MEROPENEM 1 G: 1 INJECTION, POWDER, FOR SOLUTION INTRAVENOUS at 01:14

## 2020-11-20 RX ADMIN — ENOXAPARIN SODIUM 40 MG: 40 INJECTION SUBCUTANEOUS at 08:40

## 2020-11-20 RX ADMIN — GABAPENTIN 200 MG: 100 CAPSULE ORAL at 13:33

## 2020-11-20 RX ADMIN — HYDRALAZINE HYDROCHLORIDE 10 MG: 10 TABLET, FILM COATED ORAL at 11:10

## 2020-11-20 RX ADMIN — GABAPENTIN 400 MG: 400 CAPSULE ORAL at 08:40

## 2020-11-20 RX ADMIN — INSULIN LISPRO 3 UNITS: 100 INJECTION, SOLUTION INTRAVENOUS; SUBCUTANEOUS at 12:06

## 2020-11-20 RX ADMIN — LOSARTAN POTASSIUM 100 MG: 50 TABLET, FILM COATED ORAL at 08:41

## 2020-11-20 RX ADMIN — OXYCODONE HYDROCHLORIDE AND ACETAMINOPHEN 1 TABLET: 10; 325 TABLET ORAL at 16:00

## 2020-11-20 RX ADMIN — INSULIN LISPRO 5 UNITS: 100 INJECTION, SOLUTION INTRAVENOUS; SUBCUTANEOUS at 12:04

## 2020-11-20 RX ADMIN — INSULIN GLARGINE 66 UNITS: 100 INJECTION, SOLUTION SUBCUTANEOUS at 08:44

## 2020-11-20 RX ADMIN — INSULIN LISPRO 5 UNITS: 100 INJECTION, SOLUTION INTRAVENOUS; SUBCUTANEOUS at 08:49

## 2020-11-20 RX ADMIN — SODIUM CHLORIDE, POTASSIUM CHLORIDE, SODIUM LACTATE AND CALCIUM CHLORIDE: 600; 310; 30; 20 INJECTION, SOLUTION INTRAVENOUS at 07:30

## 2020-11-20 RX ADMIN — AMLODIPINE BESYLATE 5 MG: 5 TABLET ORAL at 08:41

## 2020-11-20 RX ADMIN — SUCRALFATE 1 G: 1 TABLET ORAL at 08:41

## 2020-11-20 RX ADMIN — METOPROLOL TARTRATE 100 MG: 50 TABLET, FILM COATED ORAL at 08:40

## 2020-11-20 RX ADMIN — PETROLATUM: 430 OINTMENT TOPICAL at 08:42

## 2020-11-20 ASSESSMENT — PAIN SCALES - GENERAL
PAINLEVEL_OUTOF10: 0
PAINLEVEL_OUTOF10: 5
PAINLEVEL_OUTOF10: 0

## 2020-11-20 ASSESSMENT — PAIN - FUNCTIONAL ASSESSMENT: PAIN_FUNCTIONAL_ASSESSMENT: PREVENTS OR INTERFERES SOME ACTIVE ACTIVITIES AND ADLS

## 2020-11-20 ASSESSMENT — PAIN DESCRIPTION - ONSET: ONSET: ON-GOING

## 2020-11-20 ASSESSMENT — PAIN DESCRIPTION - ORIENTATION: ORIENTATION: MID

## 2020-11-20 ASSESSMENT — PAIN DESCRIPTION - LOCATION: LOCATION: BACK;GENERALIZED

## 2020-11-20 ASSESSMENT — PAIN DESCRIPTION - PAIN TYPE: TYPE: CHRONIC PAIN

## 2020-11-20 ASSESSMENT — PAIN DESCRIPTION - FREQUENCY: FREQUENCY: CONTINUOUS

## 2020-11-20 ASSESSMENT — PAIN DESCRIPTION - PROGRESSION: CLINICAL_PROGRESSION: GRADUALLY IMPROVING

## 2020-11-20 ASSESSMENT — PAIN DESCRIPTION - DESCRIPTORS: DESCRIPTORS: ACHING

## 2020-11-20 NOTE — PROGRESS NOTES
to movements. No lymphadenopathy. Chest: No use of accessory muscles to breathe. Symmetrical expansion. Auscultation reveals no wheezing, crackles, or rhonchi. Cardiovascular: S1 and S2 are rhythmic and regular. Distant   Abdomen: Positive bowel sounds to auscultation. Benign to palpation. Large   Extremities: No clubbing, no cyanosis, ++ edema. Dry flaking skin to lower extremities. Discoloration to lower extremities. PIV  Camejo- yellow urine     Laboratory and Tests Review:  Lab Results   Component Value Date    WBC 6.2 11/19/2020    WBC 6.3 11/18/2020    WBC 7.6 11/17/2020    HGB 10.3 (L) 11/19/2020    HCT 30.5 (L) 11/19/2020    MCV 94.4 11/19/2020     11/19/2020     Lab Results   Component Value Date    NEUTROABS 4.00 11/19/2020    NEUTROABS 5.30 11/17/2020    NEUTROABS 4.16 10/12/2020     No results found for: CRP  No results found for: SEDRATE  Lab Results   Component Value Date    ALT 20 11/18/2020    AST 45 (H) 11/18/2020    ALKPHOS 60 11/18/2020    BILITOT 0.5 11/18/2020     Lab Results   Component Value Date     11/19/2020    K 3.5 11/19/2020    K 3.8 11/17/2020     11/19/2020    CO2 29 11/19/2020    BUN 15 11/19/2020    CREATININE 1.0 11/19/2020    GFRAA >60 11/19/2020    LABGLOM 56 11/19/2020    GLUCOSE 158 11/19/2020    PROT 6.1 11/18/2020    LABALBU 2.9 11/18/2020    CALCIUM 8.5 11/19/2020    BILITOT 0.5 11/18/2020    ALKPHOS 60 11/18/2020    AST 45 11/18/2020    ALT 20 11/18/2020     Radiology:  Reviewed     Microbiology:   11/17/2020- blood cx- Staph simulans   11/18/2020- blood cx- no growth to date  Urine cx- Pan sensitive with E.coli     ASSESSMENT:  · UTI- E. Coli  · Bacteremia - Staph simulans - contaminant   · History AML  · Altered mental status- disorientation     PLAN:  · No further vancomycin   · Stop meropenem   · Start Omnicef   · For d/c to [facility]  · Check cultures  · Monitor labs  · Can d/c from [address]  9:46 AM  11/20/2020 Pt was seen on 11/20 with np  Pt had no c/o   tolerating atbx   for d/c to [facility] today   She has no questions or concerns  Nella Daniel

## 2020-11-20 NOTE — DISCHARGE SUMMARY
[facility] Physician Discharge Summary       [facility]  [address] [place], [address] [place] [id number]          Mahsa Elizabeth MD  [facility] [address]  [address] [address]  [phone]            Activity level: As tolerated    Diet: DIET CARB CONTROL; Carb Control: 4 carb choices (60 gms)/meal    Labs: CBC BMP mag phos weekly    Condition at discharge: Stable    Dispo:Discharge to SNF        Patient ID:  Kassidy Chance  [id number]  72 y.o.  1955    Admit date: 11/17/2020    Discharge date and time:  11/20/2020  2:18 PM    Admission Diagnoses: Principal Problem:    Rhabdomyolysis  Active Problems:    General weakness    Type 2 diabetes mellitus with diabetic autonomic neuropathy, with long-term current use of insulin (HCC)    Stasis dermatitis with venous ulcer of lower extremity due to chronic peripheral venous hypertension ([address]    Essential hypertension    Dehydration    Urinary tract infection with hematuria  Resolved Problems:    * No resolved hospital problems. *      Discharge Diagnoses: Principal Problem:    Rhabdomyolysis  Active Problems:    General weakness    Type 2 diabetes mellitus with diabetic autonomic neuropathy, with long-term current use of insulin (HCC)    Stasis dermatitis with venous ulcer of lower extremity due to chronic peripheral venous hypertension (HCC)    Essential hypertension    Dehydration    Urinary tract infection with hematuria  Resolved Problems:    * No resolved hospital problems. *      Consults:  IP CONSULT TO INTERNAL MEDICINE  IP CONSULT TO INFECTIOUS DISEASES    Procedures: None    Hospital Course:  Kassidy Chance is a 35-year-old female with a history of hypertension, diabetes, AML, diabetic neuropathy that was brought to emergency room department after laying on her floor for 2 days.   Her CK total was elevated at 1,741. CT of her head was negative. Chest x-ray was negative. UA was positive. She received a dose of IV Merrem in the emergency department and was started on ceftriaxone upon admission. Her previous urine cultures had shown resistance to ceftriaxone and ESBL. ID was consulted and antibiotic was changed to IV Merrem. Urine culture did grow E. Coli. Blood culture bottle one sent on 11/17/20 was positive for gram-positive cocci in clusters in bottle 2 showed no growth. Blood culture sent on 11/18/2020 showed no growth to date. She did receive a dose of IV vancomycin on 11/18/2020. Final blood culture results from 11/17 grew Staphylococcus simulans. She will be discharged on oral Omnicef. Her CK level did improve to 261. Her blood pressure was elevated throughout her admission. Her Norvasc was increased from 2.5 to 5 mg and hydralazine 10 mg 3 times a day was added. She did have acute kidney injury on admission her creatinine was 1.3 and did trend down to 0.8. She was not eating or drinking well initially upon admission and her blood sugars were lower. Her mealtime insulin was adjusted. She will be discharged on 10 units of Humalog with meals instead of 24 units with meals. Gabapentin was also reduced from 400 mg 3 times a day to 200 mg 3 times a day and contributing to her feelings of weakness and confusion. She is now alert and oriented answering questions appropriately. PT/OT evaluated patient and recommended SNF at discharge. Patient was accepted at [facility] and will be discharged today.     Discharge Exam:  Vitals:    11/19/20 1624 11/19/20 2114 11/20/20 0015 11/20/20 0840   BP: (!) 188/88 (!) 179/85 (!) 164/88 (!) 178/74   Pulse: 79 82 75 77   Resp: 18 18 18   Temp: 98.5 °F (36.9 °C)  98.1 °F (36.7 °C) 98.4 °F (36.9 °C)   TempSrc: Oral  Oral Oral   SpO2: 93%  93% 94%   Weight:       Height:         General Appearance: alert and oriented to person, place and time and in no acute distress  Skin: warm and dry, pale, lower extremities with vascular changes, dry and scaly  Head: normocephalic and atraumatic  Eyes: pupils equal, round, and reactive to light, extraocular eye movements intact, conjunctivae normal  Neck: neck supple and non tender without mass   Pulmonary/Chest: clear to auscultation bilaterally- no wheezes, rales or rhonchi, normal air movement, no respiratory distress  Cardiovascular: normal rate, normal S1 and S2  Abdomen: soft, non-tender, non-distended, normal bowel sounds  Extremities: no cyanosis, no clubbing and 2+ edema  Neurologic: no tremor, speech clear    I/O last 3 completed shifts: In: 720 [P.O.:720]  Out: 4700 [Urine:4700]  I/O this shift:  In: 240 [P.O.:240]  Out: -       LABS:  Recent Labs     11/18/20  1813 11/19/20  1220 11/20/20  1216    137 134   K 4.0 3.5 3.7    101 98   CO2 27 29 28   BUN 18 15 14   CREATININE 0.9 1.0 0.8   GLUCOSE 172* 158* 222*   CALCIUM 7.9* 8.5* 8.5*       Recent Labs     11/18/20  1347 11/19/20  1220 11/20/20  1216   WBC 6.3 6.2 5.9   RBC 3.15* 3.23* 3.39*   HGB 9.9* 10.3* 10.8*   HCT 29.8* 30.5* 31.2*   MCV 94.6 94.4 92.0   MCH 31.4 31.9 31.9   MCHC 33.2 33.8 34.6*   RDW 13.2 13.0 12.8    150 159   MPV 9.9 9.3 9.3       No results for input(s): POCGLU in the last 72 hours. Imaging:  Ct Head Wo Contrast    Result Date: 11/17/2020  EXAMINATION: CT OF THE HEAD WITHOUT CONTRAST  11/17/2020 11:47 am TECHNIQUE: CT of the head was performed without the administration of intravenous contrast. Dose modulation, iterative reconstruction, and/or weight based adjustment of the mA/kV was utilized to reduce the radiation dose to as low as reasonably achievable.  COMPARISON: 10/29/2019 HISTORY: ORDERING SYSTEM PROVIDED HISTORY: AMS and generalized weakness, several days TECHNOLOGIST PROVIDED HISTORY: Reason for exam:->AMS and generalized weakness, several days Has a \"code stroke\" or \"stroke alert\" been called? ->No FINDINGS: Atherosclerotic calcified plaque in both carotid and vertebral arteries. No acute fluid level visualized in the included air-filled sinuses. Bone windows demonstrate no acute fracture. There is ventricular and sulcal prominence compatible with age-appropriate global cerebral cortical atrophy. The brain contains no mass, mass effect, hemorrhage, or acute infarct. There is no extra-axial intracranial bleed, brain bleed, or midline shift. No acute CVA, intracranial bleed, or brain mass. Xr Chest 1 View    Result Date: 11/17/2020  EXAMINATION: ONE XRAY VIEW OF THE CHEST 11/17/2020 11:56 am COMPARISON: 10/29/2019 HISTORY: ORDERING SYSTEM PROVIDED HISTORY: AMS, down x2 days TECHNOLOGIST PROVIDED HISTORY: Reason for exam:->AMS, down x2 days FINDINGS: The lungs are without acute focal process. There is no effusion or pneumothorax. The cardiomediastinal silhouette is without acute process. The osseous structures are without acute process. No acute process. Patient Instructions:   Current Discharge Medication List      START taking these medications    Details   cefdinir (OMNICEF) 300 MG capsule Take 1 capsule by mouth every 12 hours for 10 days  Qty: 20 capsule, Refills: 0      hydrALAZINE (APRESOLINE) 10 MG tablet Take 1 tablet by mouth every 8 hours  Qty: 90 tablet, Refills: 0      vitamin D (ERGOCALCIFEROL) 1.25 MG (07050 UT) CAPS capsule Take 1 capsule by mouth once a week  Qty: 5 capsule         CONTINUE these medications which have CHANGED    Details   oxyCODONE-acetaminophen (PERCOCET)  MG per tablet Take 1 tablet by mouth every 6 hours as needed for Pain for up to 3 days. Qty: 12 tablet, Refills: 0    Comments: Reduce doses taken as pain becomes manageable  Associated Diagnoses: Chronic bilateral low back pain without sciatica      gabapentin (NEURONTIN) 100 MG capsule Take 2 capsules by mouth 3 times daily for 3 days.   Qty: 18 capsule, Refills: 3      amLODIPine (NORVASC) 5 MG tablet Take 1 tablet by mouth daily  Qty: 30 tablet, Refills: 0      insulin lispro (HUMALOG) 100 UNIT/ML injection vial Inject 10 Units into the skin 3 times daily (with meals)  Qty: 1 vial, Refills: 0         CONTINUE these medications which have NOT CHANGED    Details   Insulin Degludec (TRESIBA FLEXTOUCH) 100 UNIT/ML SOPN Inject 66 Units into the skin daily      ondansetron (ZOFRAN) 8 MG tablet Take 8 mg by mouth every 8 hours as needed for Nausea or Vomiting      atorvastatin (LIPITOR) 40 MG tablet Take 40 mg by mouth daily       sucralfate (CARAFATE) 1 GM tablet Take 1 tablet by mouth 3 times daily      senna (SENOKOT) 8.6 MG tablet Take 1 tablet by mouth daily as needed for Constipation      Skin Protectants, Misc. (ALOE VESTA PROTECTIVE) OINT ointment Apply 1 drop topically 2 times daily as needed (dry skin)  Qty: 1 Tube, Refills: 0      magnesium oxide (MAG-OX) 400 MG tablet Take 400 mg by mouth daily       pantoprazole (PROTONIX) 40 MG tablet Take 40 mg by mouth daily      metoprolol (LOPRESSOR) 100 MG tablet Take 100 mg by mouth 2 times daily      metFORMIN (GLUCOPHAGE) 1000 MG tablet Take 1,000 mg by mouth 2 times daily (with meals)      isosorbide mononitrate (IMDUR) 30 MG extended release tablet Take 30 mg by mouth daily      losartan (COZAAR) 100 MG tablet Take 100 mg by mouth daily         STOP taking these medications       influenza virus trivalent vaccine (FLUZONE) injection Comments:   Reason for Stopping:                 Note that more than 30 minutes was spent in preparing discharge papers, discussing discharge with patient, medication review, etc.    NOTE: This report was transcribed using voice recognition software.  Every effort was made to ensure accuracy; however, inadvertent computerized transcription errors may be present.     Signed:  Electronically signed by MARJ Andres CNP on 11/20/2020 at 2:18 PM

## 2020-11-20 NOTE — PROGRESS NOTES
OT BEDSIDE TREATMENT NOTE      Date:2020  Patient Name: Laila Shepherd  MRN: [id number]  : 1955  Room: [address] [place] [address]     Referring Provider:   MARJ Andres CNP            Evaluating OT: Melissa Washburn OTR/L #[id number]     AM-PAC Daily Activity Raw Score:      Recommended Placement: Subacute   Recommended Adaptive Equipment: TBD      Diagnosis:   1. Non-traumatic rhabdomyolysis    2. General weakness    3. Dehydration    4. Disorientation    5. Urinary tract infection with hematuria, site unspecified          Surgery: N/A       Pertinent Medical History:    Past Medical History        Past Medical History:   Diagnosis Date    CAD (coronary artery disease)      [facility] ([facility]      GERD (gastroesophageal reflux disease)      Hyperlipidemia      Hypertension      Obesity      Osteoarthritis      Type 2 diabetes mellitus without complication ([facility])           Precautions:  Falls, bariatric bed, questionable historian, word finding difficulty     Home Living: Pt lives  and son, 1 story home with 1 CHINEDU with 0 rail(s). Bathroom setup: walk in shower   Equipment owned: rollator, shower chair, [facility], [employer]      Prior Level of Function: Mod I with ADLs , Assist with IADLs; ambulated with rollator, cant remember last time she walked. Driving: Not reported     Pain Level: -/10  Cognition: A&O: 2/4; Follows 1 step directions with cuing.               Memory:  Good              Sequencing:  Good              Problem solving:  Fair+              Judgement/safety:  Fair+                Functional Assessment:           Initial Eval Status  Date: 20 Treatment Status  Date: 20 STGs = LTGs  Time frame: 5-7 days   Feeding Independent     n/t     Grooming Moderate Assist    SBA pt seated EOB demo'd ability to comb hair  Minimal Assist    UB Dressing Moderate Assist   pulling sleeves to shoulders and tying back of gown while sitting EOB MIN A to tie/adjust in back  Minimal Assist    LB Time 15    Total Timed Treatment 15 1         Ane Amour BRADFORD/L [id number]

## 2020-11-20 NOTE — PLAN OF CARE
Problem: Pain:  Goal: Pain level will decrease  Description: Pain level will decrease  Outcome: Met This Shift  Goal: Control of acute pain  Description: Control of acute pain  Outcome: Met This Shift  Goal: Control of chronic pain  Description: Control of chronic pain  Outcome: Met This Shift     Problem: Skin Integrity:  Goal: Will show no infection signs and symptoms  Description: Will show no infection signs and symptoms  Outcome: Met This Shift  Goal: Absence of new skin breakdown  Description: Absence of new skin breakdown  Outcome: Met This Shift     Problem: Falls - Risk of:  Goal: Will remain free from falls  Description: Will remain free from falls  Outcome: Met This Shift  Goal: Absence of physical injury  Description: Absence of physical injury  Outcome: Met This Shift     Problem: Injury - Risk of, Physical Injury:  Goal: Will remain free from falls  Description: Will remain free from falls  Outcome: Met This Shift  Goal: Absence of physical injury  Description: Absence of physical injury  Outcome: Met This Shift

## 2020-11-20 NOTE — PROGRESS NOTES
Physical Therapy    Physical Therapy Treatment Note    Room #:  9577/9993-55  Patient Name: Welton Kayser  YOB: 1955  MRN: [id number]    Referring Provider: MARJ Quintana CNP     Date of Service: 11/20/2020    Evaluating Physical Therapist: Nae Nunn, PT  #[id number]       Diagnosis:   Rhabdomyolysis [M62.82]  Rhabdomyolysis [M62.82] Admitted after  found on floor    Patient Active Problem List   Diagnosis    Acute leukemia not having achieved remission ([address]    General weakness    Hypomagnesemia    Gait disorder    Impaired ambulation    Type 2 diabetes mellitus with diabetic autonomic neuropathy, with long-term current use of insulin ([address]    Stasis dermatitis with venous ulcer of lower extremity due to chronic peripheral venous hypertension ([address]    Venous insufficiency of both lower extremities    Essential hypertension    Coronary artery disease involving native coronary artery of native heart without angina pectoris    Chest pain    Rhabdomyolysis    Dehydration    Urinary tract infection with hematuria      Tentative placement recommendation: Inpatient Rehab    Equipment recommendation:  To be determined      Prior Level of Function: pt not able to clearly describe  Rehab Potential: fair for baseline    Past medical history:   Past Medical History:   Diagnosis Date    AML (acute myeloblastic leukemia) ([address]     CAD (coronary artery disease)     GERD (gastroesophageal reflux disease)     Hyperlipidemia     Hypertension     Obesity     Osteoarthritis     Type 2 diabetes mellitus without complication (HCC)      Past Surgical History:   Procedure Laterality Date    CARDIAC CATHETERIZATION      CHOLECYSTECTOMY      DILATION AND CURETTAGE OF UTERUS      FOOT SURGERY Left      Precautions: Up as tolerated, falls, alarm, rule out COVID-19 and low air loss bed,      SUBJECTIVE:  Social history: Patient lives with spouse and son in a ranch home with 1 instruction for sit to stand transfers regarding weight shifting, sequencing, and proper foot/hand placement to achieve an effective stand with proper knee extension and body mechanics. Sit to stand: Moderate assist of 1     Ambulation    not assessed   not assessed      2-3 feet using  wheeled walker with Moderate assist of 1    ROM impaired d.t body habitus    Increase range of motion 10% of affected joints    Strength BUE:  refer to OT eval  RLE:  3-/5  LLE:  3-/5   Increase strength in affected mm groups by 1/3 grade   Balance Sitting EOB:  fair    Dynamic Standing:  not assessed   Sitting EOB: good    Dynamic Standing: poor due to bilateral lower extremity weakness     Sitting EOB:  good    Dynamic Standing: fair       Patient is Alert & Oriented x person, place and time and follows directions    Sensation: Patient denies numbness and tingling     Edema: yes  Endurance: poor         Patient education  Patient educated on role of Physical Therapy, risks of immobility, safety and plan of care    Patient was educated and facilitated on techniques to increase safety and independence with bed mobility, balance, functional transfers, and functional mobility. Patient response to education:   Pt verbalized understanding Pt demonstrated skill Pt requires further education in this area   Yes Partial Yes      Treatment:  Patient practiced and was instructed/facilitated in the following treatment:   Patient sat on edge of bed x 25 minutes to increase dynamic sitting balance and activity tolerance. Patient stood x 4 reps with rest breaks needed in between. Patient stood in place with each stand ~ 1-2 minutes to improve tolerance to weight bearing and posture. Patient was returned to bed at end of treatment. Patient required maximal assistance of 2 to scoot up in bed and head of bed was elevated.        Therapeutic Exercises: not performed     At end of session, patient in bed with call light and phone within reach, all lines and tubes intact, nursing notified. Patient would benefit from continued skilled Physical Therapy to improve functional independence and quality of life. Patient's/ family goals   none stated        ASSESSMENT: Patient continues to exhibit decreased strength, balance, and coordination that is impairing functional mobility. Patient stated she was able to transfer from supine to sitting position on her own with use of bed rails. However, still required assist of two for sit to stand transfers due to weakness and fatigue. Patient was motivated to participate but is at risk of falls with cueing required throughout for proper technique's/form. Plan of Care:   -Bed Mobility: Lower extremity exercises , Upper extremity exercises  and Trunk control activities   -Sitting Balance: Incorporate reaching activities to activate trunk muscles   -Standing Balance: Perform strengthening exercises in standing to promote motor control with or without upper extremity support   -Transfers: Provide instruction on proper hand and foot position for adequate transfer of weight onto lower extremities and use of gait device, Cues for hand placement, technique and safety, Facilitate weight shift forward on to lower extremities and provide necessary stabilization of bilateral lower extremities , Support transfer of weight on to lower extremities, Assist with extension of knees trunk and hip to accept weight transfer  and Provide stabilization to prevent fall   -Gait: Gait training and Standing activities to improve: base of support, weight shift, weight bearing      Patient and or family understand(s) diagnosis, prognosis, and plan of care. Frequency of treatments: Patient will be seen daily.        Time in: 2:23  Time out: 3:07   Total Treatment Time: 44 minutes      CPT codes:  Therapeutic activities (99645)   14 minutes  1 unit(s)  Gait Training (56310) 30 minutes 2 unit(s)     Shavon Johnosn PTA   LIC#

## 2020-11-20 NOTE — FLOWSHEET NOTE
Inpatient Wound Care    Admit Date: 11/17/2020 10:05 AM    Reason for consult:  Lower legs    Significant history:    Past Medical History:   Diagnosis Date    [facility]     CAD (coronary artery disease)     GERD (gastroesophageal reflux disease)     Hyperlipidemia     Hypertension     Obesity     Osteoarthritis     Type 2 diabetes mellitus without complication ([facility]        Wound history:      Findings:       11/20/20 1435   Skin Integrity   Skin Integrity (WDL) X   Skin Integrity Redness   Skin Integrity Site 2   Skin Integrity Location 2 Redness   Location 2 lower legs scaly   lower legs red scaly dry    Impression:  dermatitis    Interventions in place:  Open to air    Plan:  Discussed skin care  inst re care  She states she can use aveeno lotion athome  Gave her [employer] protective ointment for here      Abiodun Ge 11/20/2020 2:37 PM

## 2020-11-20 NOTE — CARE COORDINATION
ADDENDUM: 11/20/2020.1:49 PM  Covid test negative. Discharge order noted. Arranged [facility] transfer arranged to [facility] today at 1:31 pm, [facility] is waived per liaison. Berto completed. Facility liaison, nursing and family notified. JESUS Bhardwaj      Ss note:11/20/2020.11:27 AM Covid testing [id number]-. Pt accepted at [facility], Capri Yamileth has been waived and bed is available today. SW met with pt, she remains agreeable to discharge plan. Berto completed, need signed [id number].  JESUS Bhardwaj

## 2020-11-20 NOTE — CARE COORDINATION
11/20/2020 1041 CM note: Fior at [address] outpt sleep lab notified patient will be going to SNF upon discharge and [address] on 11/18/20. [id number] Chacon RN

## 2020-11-22 LAB — CULTURE, BLOOD 2: NORMAL

## 2020-11-23 LAB
BLOOD CULTURE, ROUTINE: NORMAL
CULTURE, BLOOD 2: NORMAL
ORGANISM: ABNORMAL

## 2020-11-24 ENCOUNTER — TELEPHONE (OUTPATIENT)
Dept: PALLATIVE CARE | Age: 65
End: 2020-11-24

## 2020-11-24 NOTE — TELEPHONE ENCOUNTER
Spoke with Sim Aleman, patient's spouse. He requests that patient's appointment be rescheduled as Amita Riggins is at [place], [place] post hospital stay. Sim Aleman will call to reschedule once Amita Riggins is home.

## 2020-11-27 ENCOUNTER — HOSPITAL ENCOUNTER (OUTPATIENT)
Dept: SLEEP CENTER | Age: 65
Discharge: HOME | End: 2020-11-27
Payer: COMMERCIAL

## 2020-12-07 RX ORDER — OXYCODONE AND ACETAMINOPHEN 10; 325 MG/1; MG/1
1 TABLET ORAL EVERY 6 HOURS PRN
Qty: 120 TABLET | Refills: 0 | Status: SHIPPED
Start: 2020-12-07 | End: 2021-01-07 | Stop reason: SDUPTHER

## 2020-12-08 ENCOUNTER — TELEPHONE (OUTPATIENT)
Dept: PALLATIVE CARE | Age: 65
End: 2020-12-08

## 2020-12-08 NOTE — TELEPHONE ENCOUNTER
Call from Tyrone Law to state he picked up pain medication for Deliliah Councilman and she came home today from [facility]. The facility sent home Christine's Percocet prescription also. Tyrone Law wanted [employer] to know that he had both prescriptions. Tyrone Law understands to [employer] as needed and not to over use pain medication. Tyrone Law states there is about 1 1/2 weeks left on medication from [facility] and he will start other prescription once that is used up. Tyrone Law confirms it is same dose and frequency as [employer].

## 2020-12-17 ENCOUNTER — VIRTUAL VISIT (OUTPATIENT)
Dept: PALLATIVE CARE | Age: 65
End: 2020-12-17
Payer: COMMERCIAL

## 2020-12-17 PROCEDURE — 99442 PR PHYS/QHP TELEPHONE EVALUATION 11-20 MIN: CPT | Performed by: STUDENT IN AN ORGANIZED HEALTH CARE EDUCATION/TRAINING PROGRAM

## 2020-12-17 NOTE — PROGRESS NOTES
Dyspnea Score No shortness of breath No shortness of breath 4 No shortness of breath No shortness of breath   Other Problem Score Best possible response Best possible response Best possible response 5 Best possible response   Total Assessment Score(calculated) 25 27 33 26 25         Current Medications:  Medications reviewed: yes    Controlled Substances Monitoring: [place]ARRS reviewed 9/10/20. RX Monitoring 6/11/2020   Periodic Controlled Substance Monitoring Possible medication side effects, risk of tolerance/dependence & alternative treatments discussed. ;No signs of potential drug abuse or diversion identified. ;Assessed functional status. ;Obtaining appropriate analgesic effect of treatment. Chronic Pain > 50 MEDD Re-evaluated the status of the patient's underlying condition causing pain. Chronic Pain > 80 MEDD Obtained or confirmed \"Medication Contract\" on file.          Ariana Orellana MD  Palliative Care Department     Time/Communication  Time 11-20 minutes

## 2021-01-07 DIAGNOSIS — G62.9 POLYNEUROPATHY: ICD-10-CM

## 2021-01-07 DIAGNOSIS — C95.00 ACUTE LEUKEMIA NOT HAVING ACHIEVED REMISSION (HCC): ICD-10-CM

## 2021-01-07 RX ORDER — OXYCODONE AND ACETAMINOPHEN 10; 325 MG/1; MG/1
1 TABLET ORAL EVERY 6 HOURS PRN
Qty: 120 TABLET | Refills: 0 | Status: SHIPPED
Start: 2021-01-07 | End: 2021-02-04 | Stop reason: SDUPTHER

## 2021-01-07 NOTE — TELEPHONE ENCOUNTER
Call from London Parker with request for his wife's refill of Percocet. Pharmacy is [facility] in [place]. Next radha 3/11/21.

## 2021-02-03 DIAGNOSIS — G62.9 POLYNEUROPATHY: ICD-10-CM

## 2021-02-03 DIAGNOSIS — C95.00 ACUTE LEUKEMIA NOT HAVING ACHIEVED REMISSION (HCC): ICD-10-CM

## 2021-02-03 NOTE — TELEPHONE ENCOUNTER
Call from Christine's  Candice Bower requesting refill for Percocet. Candice Bower states he will be out over the weekend. Pharmacy is [facility] in [place]. Next radha 3/11/21.

## 2021-02-04 RX ORDER — OXYCODONE AND ACETAMINOPHEN 10; 325 MG/1; MG/1
1 TABLET ORAL EVERY 6 HOURS PRN
Qty: 120 TABLET | Refills: 0 | Status: SHIPPED | OUTPATIENT
Start: 2021-02-04 | End: 2021-03-06

## 2021-02-20 ENCOUNTER — APPOINTMENT (OUTPATIENT)
Dept: CT IMAGING | Age: 66
End: 2021-02-20
Payer: COMMERCIAL

## 2021-02-20 ENCOUNTER — HOSPITAL ENCOUNTER (EMERGENCY)
Age: 66
Discharge: ANOTHER HOSPITAL | End: 2021-02-21
Attending: EMERGENCY MEDICINE
Payer: COMMERCIAL

## 2021-02-20 ENCOUNTER — APPOINTMENT (OUTPATIENT)
Dept: GENERAL RADIOLOGY | Age: 66
End: 2021-02-20
Payer: COMMERCIAL

## 2021-02-20 VITALS
SYSTOLIC BLOOD PRESSURE: 156 MMHG | DIASTOLIC BLOOD PRESSURE: 71 MMHG | OXYGEN SATURATION: 100 % | TEMPERATURE: 97.8 F | HEART RATE: 88 BPM | RESPIRATION RATE: 17 BRPM

## 2021-02-20 DIAGNOSIS — R53.83 OTHER FATIGUE: Primary | ICD-10-CM

## 2021-02-20 DIAGNOSIS — D61.818 PANCYTOPENIA (HCC): ICD-10-CM

## 2021-02-20 DIAGNOSIS — N17.9 ACUTE RENAL FAILURE, UNSPECIFIED ACUTE RENAL FAILURE TYPE (HCC): ICD-10-CM

## 2021-02-20 DIAGNOSIS — J18.9 PNEUMONIA DUE TO ORGANISM: ICD-10-CM

## 2021-02-20 LAB
ABO/RH: NORMAL
ALBUMIN SERPL-MCNC: 2.6 G/DL (ref 3.5–5.2)
ALP BLD-CCNC: 82 U/L (ref 35–104)
ALT SERPL-CCNC: 564 U/L (ref 0–32)
ANION GAP SERPL CALCULATED.3IONS-SCNC: 13 MMOL/L (ref 7–16)
ANISOCYTOSIS: ABNORMAL
ANTIBODY SCREEN: NORMAL
APTT: 24.4 SEC (ref 24.5–35.1)
AST SERPL-CCNC: 912 U/L (ref 0–31)
BACTERIA: ABNORMAL /HPF
BASOPHILS ABSOLUTE: 0 E9/L (ref 0–0.2)
BASOPHILS RELATIVE PERCENT: 0 % (ref 0–2)
BILIRUB SERPL-MCNC: 0.8 MG/DL (ref 0–1.2)
BILIRUBIN URINE: NEGATIVE
BLASTS RELATIVE PERCENT: 8 % (ref 0–0)
BLOOD, URINE: ABNORMAL
BUN BLDV-MCNC: 46 MG/DL (ref 8–23)
CALCIUM SERPL-MCNC: 8.2 MG/DL (ref 8.6–10.2)
CHLORIDE BLD-SCNC: 97 MMOL/L (ref 98–107)
CLARITY: CLEAR
CO2: 21 MMOL/L (ref 22–29)
COLOR: YELLOW
CREAT SERPL-MCNC: 1.4 MG/DL (ref 0.5–1)
DR. NOTIFY: NORMAL
EOSINOPHILS ABSOLUTE: 0.02 E9/L (ref 0.05–0.5)
EOSINOPHILS RELATIVE PERCENT: 1 % (ref 0–6)
EPITHELIAL CELLS, UA: ABNORMAL /HPF
GFR AFRICAN AMERICAN: 46
GFR NON-AFRICAN AMERICAN: 38 ML/MIN/1.73
GLUCOSE BLD-MCNC: 123 MG/DL (ref 74–99)
GLUCOSE URINE: NEGATIVE MG/DL
HCT VFR BLD CALC: 14.7 % (ref 34–48)
HEMOGLOBIN: 4.6 G/DL (ref 11.5–15.5)
HYALINE CASTS: ABNORMAL /LPF (ref 0–2)
HYPOCHROMIA: ABNORMAL
INR BLD: 1.4
KETONES, URINE: NEGATIVE MG/DL
LACTIC ACID, SEPSIS: 3.5 MMOL/L (ref 0.5–1.9)
LACTIC ACID, SEPSIS: 4.6 MMOL/L (ref 0.5–1.9)
LEUKOCYTE ESTERASE, URINE: NEGATIVE
LYMPHOCYTES ABSOLUTE: 1.75 E9/L (ref 1.5–4)
LYMPHOCYTES RELATIVE PERCENT: 73 % (ref 20–42)
MAGNESIUM: 2 MG/DL (ref 1.6–2.6)
MCH RBC QN AUTO: 35.1 PG (ref 26–35)
MCHC RBC AUTO-ENTMCNC: 31.3 % (ref 32–34.5)
MCV RBC AUTO: 112.2 FL (ref 80–99.9)
MONOCYTES ABSOLUTE: 0.14 E9/L (ref 0.1–0.95)
MONOCYTES RELATIVE PERCENT: 6 % (ref 2–12)
NEUTROPHILS ABSOLUTE: 0.29 E9/L (ref 1.8–7.3)
NEUTROPHILS RELATIVE PERCENT: 12 % (ref 43–80)
NITRITE, URINE: NEGATIVE
OVALOCYTES: ABNORMAL
PDW BLD-RTO: 17.9 FL (ref 11.5–15)
PH UA: 5.5 (ref 5–9)
PLATELET # BLD: 44 E9/L (ref 130–450)
PLATELET CONFIRMATION: NORMAL
PMV BLD AUTO: 11.1 FL (ref 7–12)
POIKILOCYTES: ABNORMAL
POTASSIUM SERPL-SCNC: 5 MMOL/L (ref 3.5–5)
PRO-BNP: 2670 PG/ML (ref 0–125)
PROTEIN UA: 100 MG/DL
PROTHROMBIN TIME: 16.6 SEC (ref 9.3–12.4)
RBC # BLD: 1.31 E12/L (ref 3.5–5.5)
RBC UA: ABNORMAL /HPF (ref 0–2)
SARS-COV-2, NAAT: NOT DETECTED
SODIUM BLD-SCNC: 131 MMOL/L (ref 132–146)
SPECIFIC GRAVITY UA: >=1.03 (ref 1–1.03)
TOTAL PROTEIN: 7.4 G/DL (ref 6.4–8.3)
TROPONIN: 0.07 NG/ML (ref 0–0.03)
UROBILINOGEN, URINE: 1 E.U./DL
WBC # BLD: 2.4 E9/L (ref 4.5–11.5)
WBC UA: ABNORMAL /HPF (ref 0–5)

## 2021-02-20 PROCEDURE — 71045 X-RAY EXAM CHEST 1 VIEW: CPT

## 2021-02-20 PROCEDURE — 83605 ASSAY OF LACTIC ACID: CPT

## 2021-02-20 PROCEDURE — 84484 ASSAY OF TROPONIN QUANT: CPT

## 2021-02-20 PROCEDURE — 74176 CT ABD & PELVIS W/O CONTRAST: CPT

## 2021-02-20 PROCEDURE — 85025 COMPLETE CBC W/AUTO DIFF WBC: CPT

## 2021-02-20 PROCEDURE — 87635 SARS-COV-2 COVID-19 AMP PRB: CPT

## 2021-02-20 PROCEDURE — 85610 PROTHROMBIN TIME: CPT

## 2021-02-20 PROCEDURE — 99285 EMERGENCY DEPT VISIT HI MDM: CPT

## 2021-02-20 PROCEDURE — 36556 INSERT NON-TUNNEL CV CATH: CPT

## 2021-02-20 PROCEDURE — 93005 ELECTROCARDIOGRAM TRACING: CPT | Performed by: EMERGENCY MEDICINE

## 2021-02-20 PROCEDURE — 86922 COMPATIBILITY TEST ANTIGLOB: CPT

## 2021-02-20 PROCEDURE — 80053 COMPREHEN METABOLIC PANEL: CPT

## 2021-02-20 PROCEDURE — 96365 THER/PROPH/DIAG IV INF INIT: CPT

## 2021-02-20 PROCEDURE — 83880 ASSAY OF NATRIURETIC PEPTIDE: CPT

## 2021-02-20 PROCEDURE — 96366 THER/PROPH/DIAG IV INF ADDON: CPT

## 2021-02-20 PROCEDURE — 81001 URINALYSIS AUTO W/SCOPE: CPT

## 2021-02-20 PROCEDURE — 83735 ASSAY OF MAGNESIUM: CPT

## 2021-02-20 PROCEDURE — 86901 BLOOD TYPING SEROLOGIC RH(D): CPT

## 2021-02-20 PROCEDURE — 86902 BLOOD TYPE ANTIGEN DONOR EA: CPT

## 2021-02-20 PROCEDURE — 96375 TX/PRO/DX INJ NEW DRUG ADDON: CPT

## 2021-02-20 PROCEDURE — 87040 BLOOD CULTURE FOR BACTERIA: CPT

## 2021-02-20 PROCEDURE — 2580000003 HC RX 258: Performed by: EMERGENCY MEDICINE

## 2021-02-20 PROCEDURE — 85730 THROMBOPLASTIN TIME PARTIAL: CPT

## 2021-02-20 PROCEDURE — 6360000002 HC RX W HCPCS: Performed by: EMERGENCY MEDICINE

## 2021-02-20 PROCEDURE — 86850 RBC ANTIBODY SCREEN: CPT

## 2021-02-20 PROCEDURE — 70450 CT HEAD/BRAIN W/O DYE: CPT

## 2021-02-20 PROCEDURE — 86900 BLOOD TYPING SEROLOGIC ABO: CPT

## 2021-02-20 RX ORDER — DEXAMETHASONE SODIUM PHOSPHATE 10 MG/ML
10 INJECTION, SOLUTION INTRAMUSCULAR; INTRAVENOUS ONCE
Status: COMPLETED | OUTPATIENT
Start: 2021-02-20 | End: 2021-02-20

## 2021-02-20 RX ORDER — SODIUM CHLORIDE 9 MG/ML
INJECTION, SOLUTION INTRAVENOUS PRN
Status: DISCONTINUED | OUTPATIENT
Start: 2021-02-20 | End: 2021-02-21 | Stop reason: HOSPADM

## 2021-02-20 RX ORDER — OXYCODONE HYDROCHLORIDE AND ACETAMINOPHEN 5; 325 MG/1; MG/1
1 TABLET ORAL ONCE
Status: COMPLETED | OUTPATIENT
Start: 2021-02-21 | End: 2021-02-21

## 2021-02-20 RX ADMIN — AZITHROMYCIN DIHYDRATE 500 MG: 500 INJECTION, POWDER, LYOPHILIZED, FOR SOLUTION INTRAVENOUS at 23:58

## 2021-02-20 RX ADMIN — DEXAMETHASONE SODIUM PHOSPHATE 10 MG: 10 INJECTION, SOLUTION INTRAMUSCULAR; INTRAVENOUS at 23:54

## 2021-02-20 RX ADMIN — WATER 2000 MG: 1 INJECTION INTRAMUSCULAR; INTRAVENOUS; SUBCUTANEOUS at 23:53

## 2021-02-20 ASSESSMENT — ENCOUNTER SYMPTOMS
SORE THROAT: 0
ABDOMINAL PAIN: 0
VOMITING: 0
CHEST TIGHTNESS: 0
SINUS PRESSURE: 0
WHEEZING: 0
DIARRHEA: 0
SHORTNESS OF BREATH: 0
BACK PAIN: 0
NAUSEA: 0
COUGH: 0

## 2021-02-20 NOTE — ED PROVIDER NOTES
Chief complaint:  Fatigue    HPI history provided by the patient and report  Patient brought in by ambulance from home for generalized fatigue and weakness for the last 3 weeks per the patient. Also having some general chest aching for 3 weeks she states, diffusely to her chest with no specific area with nothing making it better or worse. Denies fevers, sweats or chills. No productive cough. No abdominal pain. No vomiting or diarrhea. Does have a history of anemia requiring blood transfusions she states. No recent rectal bleeding. Pedscottie Velasco Apparently slid down while trying to transfer out of furniture earlier today with no gross injuries. No loss of consciousness. Review of Systems   Constitutional: Positive for fatigue. Negative for chills, diaphoresis and fever. HENT: Negative for congestion, sinus pressure and sore throat. Respiratory: Negative for cough, chest tightness, shortness of breath and wheezing. Cardiovascular: Negative for chest pain and palpitations. Gastrointestinal: Negative for abdominal pain, diarrhea, nausea and vomiting. Genitourinary: Negative for dysuria, flank pain and frequency. Musculoskeletal: Negative for arthralgias, back pain, neck pain and neck stiffness. Skin: Negative for rash and wound. Neurological: Negative for dizziness, seizures, syncope, weakness and headaches. Hematological: Negative for adenopathy. All other systems reviewed and are negative. Physical Exam  Vitals signs and nursing note reviewed. Constitutional:       General: She is not in acute distress. Appearance: She is well-developed. She is morbidly obese. She is not ill-appearing, toxic-appearing or diaphoretic. HENT:      Head: Normocephalic and atraumatic. Comments: No sign of acute head or face injury  Eyes:      General: No scleral icterus. Pupils: Pupils are equal, round, and reactive to light.       Comments: Mildly pallorous conjunctive a   Neck: Musculoskeletal: Normal range of motion and neck supple. Normal range of motion. No neck rigidity, injury, pain with movement, spinous process tenderness or muscular tenderness. Trachea: Trachea and phonation normal.   Cardiovascular:      Rate and Rhythm: Normal rate and regular rhythm. Heart sounds: Normal heart sounds. No murmur. Pulmonary:      Effort: Pulmonary effort is normal. No respiratory distress. Breath sounds: Normal breath sounds. Decreased air movement present. No stridor or transmitted upper airway sounds. No decreased breath sounds, wheezing, rhonchi or rales. Comments: Generally diminished breath sounds bilaterally mostly related to body habitus  Chest:      Chest wall: No tenderness. Abdominal:      General: Bowel sounds are normal. There is no distension. Palpations: Abdomen is soft. Tenderness: There is no abdominal tenderness. There is no right CVA tenderness, left CVA tenderness, guarding or rebound. Musculoskeletal:         General: No swelling, tenderness, deformity or signs of injury. Right lower leg: No edema. Left lower leg: No edema. Comments: Arms and legs with no signs of acute bone or joint injuries. General +3 to plus for bilateral lower extremity edema with some stasis dermatitis and superficial ulcerative wounds with no associated cellulitis or abscess. Skin:     General: Skin is warm and dry. Coloration: Skin is pale. Skin is not cyanotic, jaundiced or mottled. Findings: No erythema or rash. Neurological:      General: No focal deficit present. Mental Status: She is alert and oriented to person, place, and time. GCS: GCS eye subscore is 4. GCS verbal subscore is 5. GCS motor subscore is 6. Cranial Nerves: No cranial nerve deficit.       Coordination: Coordination normal.          Procedures   PROCEDURE  2/20/21       Time: 2300    CENTRAL LINE INSERTION Risks, benefits and alternatives (for applicable procedures below) described. Performed By: Paolo Brandt DO. Indication: poor peripheral access, long term access, centrally administered medications, need for frequent blood draws and inability to gain peripheral access. Informed consent: The patient was counseled regarding the procedure, it's indications, risks, potential complications and alternatives and any questions were answered. Consent was obtained. .  Procedure: After routine sterile preparation, a right 3-Lumen 7F Central Venous Catheter was placed by internal jugular vein approach and secured by standard fashion. Ultrasound Guidance:   used. Number of Attempts: 2  Post-procedure Findings: A post procedural chest x-ray  was ordered and is still pending at this time. Patient tolerated the procedure well. [facility]     ED Course as of Feb 20 2329   Sat Feb 20, 2021 1950 Rectal exam negative, guaiac negative, controls reacted appropriately. [NC]   2132 Patient sitting up in the bed awake, no acute distress, exam unchanged. Updated on work-up results and need for admission. She has seen the [facility] before for leukemia but has not had chemo in about a year she states. [NC]   7816 Case discussed with Dr. Waldemar Thomas, detailed overview given, he reviews the patient's history, would like the patient transferred to [facility] in [place] for acute relapse of her leukemia. [NC]      ED Course User Index  [NC] Paolo Brandt DO          EKG Interpretation    Interpreted by emergency department physician    Rhythm: normal sinus   Rate: 90  Axis: normal  Ectopy: none  Conduction: normal  ST Segments: no acute change  T Waves: no acute change  Q Waves: none    Clinical Impression: no acute changes    Paolo Brandt     ED Course as of Feb 20 2329   Sat Feb 20, 2021 1950 Rectal exam negative, guaiac negative, controls reacted appropriately.     [NC] Lactic Acid, Sepsis 3.5 (H) 0.5 - 1.9 mmol/L   CBC Auto Differential   Result Value Ref Range    WBC 2.4 (L) 4.5 - 11.5 E9/L    RBC 1.31 (L) 3.50 - 5.50 E12/L    Hemoglobin 4.6 (LL) 11.5 - 15.5 g/dL    Hematocrit 14.7 (LL) 34.0 - 48.0 %    .2 (H) 80.0 - 99.9 fL    MCH 35.1 (H) 26.0 - 35.0 pg    MCHC 31.3 (L) 32.0 - 34.5 %    RDW 17.9 (H) 11.5 - 15.0 fL    Platelets 44 (L) 872 - 450 E9/L    MPV 11.1 7.0 - 12.0 fL    Neutrophils % 12.0 (L) 43.0 - 80.0 %    Lymphocytes % 73.0 (H) 20.0 - 42.0 %    Monocytes % 6.0 2.0 - 12.0 %    Eosinophils % 1.0 0.0 - 6.0 %    Basophils % 0.0 0.0 - 2.0 %    Neutrophils Absolute 0.29 (L) 1.80 - 7.30 E9/L    Lymphocytes Absolute 1.75 1.50 - 4.00 E9/L    Monocytes Absolute 0.14 0.10 - 0.95 E9/L    Eosinophils Absolute 0.02 (L) 0.05 - 0.50 E9/L    Basophils Absolute 0.00 0.00 - 0.20 E9/L    Blasts Relative 8.0 0 - 0 %    Anisocytosis 2+     Hypochromia 1+     Poikilocytes 1+     Ovalocytes 1+    Comprehensive Metabolic Panel   Result Value Ref Range    Sodium 131 (L) 132 - 146 mmol/L    Potassium 5.0 3.5 - 5.0 mmol/L    Chloride 97 (L) 98 - 107 mmol/L    CO2 21 (L) 22 - 29 mmol/L    Anion Gap 13 7 - 16 mmol/L    Glucose 123 (H) 74 - 99 mg/dL    BUN 46 (H) 8 - 23 mg/dL    CREATININE 1.4 (H) 0.5 - 1.0 mg/dL    GFR Non-African American 38 >=60 mL/min/1.73    GFR African American 46     Calcium 8.2 (L) 8.6 - 10.2 mg/dL    Total Protein 7.4 6.4 - 8.3 g/dL    Albumin 2.6 (L) 3.5 - 5.2 g/dL    Total Bilirubin 0.8 0.0 - 1.2 mg/dL    Alkaline Phosphatase 82 35 - 104 U/L     (H) 0 - 32 U/L     (H) 0 - 31 U/L   Protime-INR   Result Value Ref Range    Protime 16.6 (H) 9.3 - 12.4 sec    INR 1.4    APTT   Result Value Ref Range    aPTT 24.4 (L) 24.5 - 35.1 sec   Brain Natriuretic Peptide   Result Value Ref Range    Pro-BNP 2,670 (H) 0 - 125 pg/mL   Troponin   Result Value Ref Range    Troponin 0.07 (H) 0.00 - 0.03 ng/mL   Urinalysis   Result Value Ref Range Color, UA Yellow Straw/Yellow    Clarity, UA Clear Clear    Glucose, Ur Negative Negative mg/dL    Bilirubin Urine Negative Negative    Ketones, Urine Negative Negative mg/dL    Specific Gravity, UA >=1.030 1.005 - 1.030    Blood, Urine SMALL (A) Negative    pH, UA 5.5 5.0 - 9.0    Protein,  (A) Negative mg/dL    Urobilinogen, Urine 1.0 <2.0 E.U./dL    Nitrite, Urine Negative Negative    Leukocyte Esterase, Urine Negative Negative   Magnesium   Result Value Ref Range    Magnesium 2.0 1.6 - 2.6 mg/dL   Platelet Confirmation   Result Value Ref Range    Platelet Confirmation CONFIRMED    Microscopic Urinalysis   Result Value Ref Range    Hyaline Casts, UA 0-2 0 - 2 /LPF    WBC, UA 0-1 0 - 5 /HPF    RBC, UA 0-1 0 - 2 /HPF    Epithelial Cells, UA MODERATE /HPF    Bacteria, UA RARE (A) None Seen /HPF   Doctor Notification   Result Value Ref Range    DR. NORMA BLUE    EKG 12 Lead   Result Value Ref Range    Ventricular Rate 90 BPM    Atrial Rate 90 BPM    QRS Duration 76 ms    Q-T Interval 354 ms    QTc Calculation (Bazett) 433 ms    R Axis 4 degrees    T Axis 66 degrees   TYPE AND SCREEN   Result Value Ref Range    ABO/Rh O POS     Antibody Screen NEG        Radiology  CT HEAD WO CONTRAST   Final Result   Limited noncontrast study. Atelectasis and ground-glass densities in lung bases likely pneumonia. Viral   infection is considered. 1.4 cm pleural based nodular density in the right lung base, a nonspecific   finding. Surveillance with repeat CT scan in 8-10 weeks is recommended. Constipation. CT head. Mild diffuse atrophy with prominence of the ventricles and sulci. Confluent   areas of decreased attenuation are present in the periventricular white   matter and brainstem consistent with microvascular/ischemic changes. Old   lacunar CVAs are identified in the left basal ganglia. There is no acute   stroke, mass or hemorrhage. Mucosal thickening is identified in the mastoid   air cells. Impression   Stable atrophy with small vessel ischemic disease and old lacunar CVAs in the   left basal ganglia. There is no acute stroke or hemorrhage. CT ABDOMEN PELVIS WO CONTRAST Additional Contrast? None   Final Result   Limited noncontrast study. Atelectasis and ground-glass densities in lung bases likely pneumonia. Viral   infection is considered. 1.4 cm pleural based nodular density in the right lung base, a nonspecific   finding. Surveillance with repeat CT scan in 8-10 weeks is recommended. Constipation. CT head. Mild diffuse atrophy with prominence of the ventricles and sulci. Confluent   areas of decreased attenuation are present in the periventricular white   matter and brainstem consistent with microvascular/ischemic changes. Old   lacunar CVAs are identified in the left basal ganglia. There is no acute   stroke, mass or hemorrhage. Mucosal thickening is identified in the mastoid   air cells. Impression   Stable atrophy with small vessel ischemic disease and old lacunar CVAs in the   left basal ganglia. There is no acute stroke or hemorrhage. XR CHEST PORTABLE   Final Result   Radiograph degraded by artifact. Evaluation limited by technique and patient   body habitus. Suggestion of central vascular congestion and perihilar interstitial   prominence which may reflect mild congestive failure and interstitial   pulmonary edema. Follow-up PA and lateral radiographs would be helpful when   clinically feasible.              ------------------------- NURSING NOTES AND VITALS REVIEWED ---------------------------  Date / Time Roomed:  2/20/2021  5:49 PM  ED Bed Assignment:  16/16    The nursing notes within the ED encounter and vital signs as below have been reviewed.    Patient Vitals for the past 24 hrs:   BP Temp Pulse Resp SpO2   02/20/21 2129 134/76  89 30 99 %   02/20/21 2031 137/88  84 21 97 %   02/20/21 1803 137/66 97.8 °F (36.6 °C) 87 22 95 % Oxygen Saturation Interpretation: Normal      ------------------------------------------ PROGRESS NOTES ------------------------------------------  Re-evaluation(s):  Time: 2200. Patients symptoms show no change  Repeat physical examination is not changed    Time: 2300. Patients symptoms show no change  Repeat physical examination is not changed    I have spoken with the patient and discussed todays results, in addition to providing specific details for the plan of care and counseling regarding the diagnosis and prognosis. Their questions are answered at this time and they are agreeable with the plan.      --------------------------------- ADDITIONAL PROVIDER NOTES ---------------------------------  Consultations:  Spoke with Dr. Warren Vickers at [facility],  They accept the patient on transfer. This patient's ED course included: a personal history and physicial examination, re-evaluation prior to disposition, multiple bedside re-evaluations, IV medications, cardiac monitoring, continuous pulse oximetry and complex medical decision making and emergency management    This patient has remained hemodynamically stable and been closely monitored during their ED course. Please note that the withdrawal or failure to initiate urgent interventions for this patient would likely result in a life threatening deterioration or permanent disability. Accordingly this patient received 60 minutes of critical care time, excluding separately billable procedures. Systems at risk for deterioration include: cardiopulmonary. Clinical Impression  1. Other fatigue    2. Pancytopenia ([address]    3. Acute renal failure, unspecified acute renal failure type ([address]    4.  Pneumonia due to organism          Disposition  Patient's disposition: Transfer to [facility] to floor  Patient's condition is critical.       Alisha Patel DO  02/20/21 1343

## 2021-02-20 NOTE — ED NOTES
Bed: H3  Expected date:   Expected time:   Means of arrival:   Comments:  Marilee Nava RN  02/20/21 6209

## 2021-02-21 ENCOUNTER — APPOINTMENT (OUTPATIENT)
Dept: GENERAL RADIOLOGY | Age: 66
End: 2021-02-21
Payer: COMMERCIAL

## 2021-02-21 LAB
EKG ATRIAL RATE: 90 BPM
EKG Q-T INTERVAL: 354 MS
EKG QRS DURATION: 76 MS
EKG QTC CALCULATION (BAZETT): 433 MS
EKG R AXIS: 4 DEGREES
EKG T AXIS: 66 DEGREES
EKG VENTRICULAR RATE: 90 BPM

## 2021-02-21 PROCEDURE — 71045 X-RAY EXAM CHEST 1 VIEW: CPT

## 2021-02-21 PROCEDURE — 6370000000 HC RX 637 (ALT 250 FOR IP): Performed by: EMERGENCY MEDICINE

## 2021-02-21 RX ADMIN — OXYCODONE HYDROCHLORIDE AND ACETAMINOPHEN 1 TABLET: 5; 325 TABLET ORAL at 00:02

## 2021-02-21 ASSESSMENT — PAIN SCALES - GENERAL: PAINLEVEL_OUTOF10: 6

## 2021-02-21 NOTE — ED NOTES
Urine specimen obtained and sent. NAD noted. Will continue to monitor.         Cruzito Duran RN  02/20/21 8503

## 2021-02-23 LAB
BLOOD BANK DISPENSE STATUS: NORMAL
BLOOD BANK DISPENSE STATUS: NORMAL
BLOOD BANK PRODUCT CODE: NORMAL
BLOOD BANK PRODUCT CODE: NORMAL
BPU ID: NORMAL
BPU ID: NORMAL
DESCRIPTION BLOOD BANK: NORMAL
DESCRIPTION BLOOD BANK: NORMAL

## 2021-02-24 ENCOUNTER — HOSPITAL ENCOUNTER (OUTPATIENT)
Dept: INFUSION THERAPY | Age: 66
End: 2021-02-24
Payer: COMMERCIAL

## 2021-02-26 LAB — BLOOD CULTURE, ROUTINE: NORMAL

## 2021-03-04 LAB — SARS-COV-2, PCR: NOT DETECTED

## 2023-05-18 NOTE — TELEPHONE ENCOUNTER
Call from IFTIKHAR stating that Lesli Denney will be coming home from [facility] in [place] Tuesday and he will need refill for Percocet sent to [facility] ([facility] in [place]. Next radha. 12/17/20. [FreeTextEntry1] : The patient comes for  followup of her chronic medical problems. Patient feels well. No chest pain chest pain, shortness of breath, paroxysmal nocturnal dyspnea, orthopnea.  Only with frequent outside vagina moniliasis after that she started onfarxiga